# Patient Record
Sex: FEMALE | Race: OTHER | NOT HISPANIC OR LATINO
[De-identification: names, ages, dates, MRNs, and addresses within clinical notes are randomized per-mention and may not be internally consistent; named-entity substitution may affect disease eponyms.]

---

## 2020-07-07 PROBLEM — Z00.00 ENCOUNTER FOR PREVENTIVE HEALTH EXAMINATION: Status: ACTIVE | Noted: 2020-07-07

## 2020-07-08 ENCOUNTER — TRANSCRIPTION ENCOUNTER (OUTPATIENT)
Age: 80
End: 2020-07-08

## 2020-07-09 ENCOUNTER — RESULT REVIEW (OUTPATIENT)
Age: 80
End: 2020-07-09

## 2020-07-09 ENCOUNTER — APPOINTMENT (OUTPATIENT)
Dept: HEMATOLOGY ONCOLOGY | Facility: CLINIC | Age: 80
End: 2020-07-09
Payer: MEDICARE

## 2020-07-09 ENCOUNTER — OUTPATIENT (OUTPATIENT)
Dept: OUTPATIENT SERVICES | Facility: HOSPITAL | Age: 80
LOS: 1 days | End: 2020-07-09
Payer: MEDICARE

## 2020-07-09 ENCOUNTER — OUTPATIENT (OUTPATIENT)
Dept: OUTPATIENT SERVICES | Facility: HOSPITAL | Age: 80
LOS: 1 days | Discharge: HOME | End: 2020-07-09

## 2020-07-09 VITALS
WEIGHT: 139.99 LBS | HEIGHT: 60.63 IN | RESPIRATION RATE: 19 BRPM | HEART RATE: 124 BPM | TEMPERATURE: 97.7 F | DIASTOLIC BLOOD PRESSURE: 87 MMHG | SYSTOLIC BLOOD PRESSURE: 142 MMHG | BODY MASS INDEX: 26.78 KG/M2 | OXYGEN SATURATION: 91 %

## 2020-07-09 DIAGNOSIS — Z82.49 FAMILY HISTORY OF ISCHEMIC HEART DISEASE AND OTHER DISEASES OF THE CIRCULATORY SYSTEM: ICD-10-CM

## 2020-07-09 DIAGNOSIS — D72.89 OTHER SPECIFIED DISORDERS OF WHITE BLOOD CELLS: ICD-10-CM

## 2020-07-09 DIAGNOSIS — D47.3 ESSENTIAL (HEMORRHAGIC) THROMBOCYTHEMIA: ICD-10-CM

## 2020-07-09 LAB
ANISOCYTOSIS BLD QL: SLIGHT — SIGNIFICANT CHANGE UP
BASOPHILS # BLD AUTO: 0.26 K/UL — HIGH (ref 0–0.2)
BASOPHILS NFR BLD AUTO: 2 % — SIGNIFICANT CHANGE UP (ref 0–2)
BURR CELLS BLD QL SMEAR: PRESENT — SIGNIFICANT CHANGE UP
ELLIPTOCYTES BLD QL SMEAR: SLIGHT — SIGNIFICANT CHANGE UP
EOSINOPHIL # BLD AUTO: 1.17 K/UL — HIGH (ref 0–0.5)
EOSINOPHIL NFR BLD AUTO: 9 % — HIGH (ref 0–6)
ERYTHROCYTE [SEDIMENTATION RATE] IN BLOOD: 10 MM/HR — SIGNIFICANT CHANGE UP (ref 0–20)
GIANT PLATELETS BLD QL SMEAR: PRESENT — SIGNIFICANT CHANGE UP
HCT VFR BLD CALC: 40.8 % — SIGNIFICANT CHANGE UP (ref 34.5–45)
HGB BLD-MCNC: 12.1 G/DL — SIGNIFICANT CHANGE UP (ref 11.5–15.5)
LG PLATELETS BLD QL AUTO: SIGNIFICANT CHANGE UP
LYMPHOCYTES # BLD AUTO: 17 % — SIGNIFICANT CHANGE UP (ref 13–44)
LYMPHOCYTES # BLD AUTO: 2.2 K/UL — SIGNIFICANT CHANGE UP (ref 1–3.3)
MCHC RBC-ENTMCNC: 22.9 PG — LOW (ref 27–34)
MCHC RBC-ENTMCNC: 29.7 GM/DL — LOW (ref 32–36)
MCV RBC AUTO: 77.1 FL — LOW (ref 80–100)
MONOCYTES # BLD AUTO: 0.39 K/UL — SIGNIFICANT CHANGE UP (ref 0–0.9)
MONOCYTES NFR BLD AUTO: 3 % — SIGNIFICANT CHANGE UP (ref 2–14)
NEUTROPHILS # BLD AUTO: 8.94 K/UL — HIGH (ref 1.8–7.4)
NEUTROPHILS NFR BLD AUTO: 69 % — SIGNIFICANT CHANGE UP (ref 43–77)
NRBC # BLD: 0 /100 — SIGNIFICANT CHANGE UP (ref 0–0)
NRBC # BLD: SIGNIFICANT CHANGE UP /100 WBCS (ref 0–0)
PLAT MORPH BLD: ABNORMAL
PLATELET # BLD AUTO: 1143 K/UL — CRITICAL HIGH (ref 150–400)
POIKILOCYTOSIS BLD QL AUTO: SLIGHT — SIGNIFICANT CHANGE UP
POLYCHROMASIA BLD QL SMEAR: SLIGHT — SIGNIFICANT CHANGE UP
RBC # BLD: 5.29 M/UL — HIGH (ref 3.8–5.2)
RBC # FLD: 18.2 % — HIGH (ref 10.3–14.5)
RBC BLD AUTO: ABNORMAL
WBC # BLD: 12.96 K/UL — HIGH (ref 3.8–10.5)
WBC # FLD AUTO: 12.96 K/UL — HIGH (ref 3.8–10.5)

## 2020-07-09 PROCEDURE — 88189 FLOWCYTOMETRY/READ 16 & >: CPT

## 2020-07-09 PROCEDURE — 88305 TISSUE EXAM BY PATHOLOGIST: CPT

## 2020-07-09 PROCEDURE — 88237 TISSUE CULTURE BONE MARROW: CPT

## 2020-07-09 PROCEDURE — 81219 CALR GENE COM VARIANTS: CPT

## 2020-07-09 PROCEDURE — 81270 JAK2 GENE: CPT

## 2020-07-09 PROCEDURE — 87205 SMEAR GRAM STAIN: CPT

## 2020-07-09 PROCEDURE — 81207 BCR/ABL1 GENE MINOR BP: CPT

## 2020-07-09 PROCEDURE — 88275 CYTOGENETICS 100-300: CPT

## 2020-07-09 PROCEDURE — 88280 CHROMOSOME KARYOTYPE STUDY: CPT

## 2020-07-09 PROCEDURE — 88271 CYTOGENETICS DNA PROBE: CPT

## 2020-07-09 PROCEDURE — 85097 BONE MARROW INTERPRETATION: CPT

## 2020-07-09 PROCEDURE — 88291 CYTO/MOLECULAR REPORT: CPT | Mod: 59

## 2020-07-09 PROCEDURE — 88313 SPECIAL STAINS GROUP 2: CPT

## 2020-07-09 PROCEDURE — 81206 BCR/ABL1 GENE MAJOR BP: CPT

## 2020-07-09 PROCEDURE — 88108 CYTOPATH CONCENTRATE TECH: CPT | Mod: 26,59

## 2020-07-09 PROCEDURE — 88313 SPECIAL STAINS GROUP 2: CPT | Mod: 26

## 2020-07-09 PROCEDURE — 88264 CHROMOSOME ANALYSIS 20-25: CPT

## 2020-07-09 PROCEDURE — 99204 OFFICE O/P NEW MOD 45 MIN: CPT | Mod: 25

## 2020-07-09 PROCEDURE — 88184 FLOWCYTOMETRY/ TC 1 MARKER: CPT

## 2020-07-09 PROCEDURE — 81338 MPL GENE COMMON VARIANTS: CPT

## 2020-07-09 PROCEDURE — G0452: CPT | Mod: 26

## 2020-07-09 PROCEDURE — 88185 FLOWCYTOMETRY/TC ADD-ON: CPT

## 2020-07-09 PROCEDURE — 88305 TISSUE EXAM BY PATHOLOGIST: CPT | Mod: 26

## 2020-07-09 PROCEDURE — 88189 FLOWCYTOMETRY/READ 16 & >: CPT | Mod: 59

## 2020-07-09 PROCEDURE — 38222 DX BONE MARROW BX & ASPIR: CPT | Mod: RT

## 2020-07-09 NOTE — REVIEW OF SYSTEMS
[Negative] : Allergic/Immunologic [Fatigue] : fatigue [Recent Change In Weight] : ~T recent weight change [Joint Pain] : joint pain [FreeTextEntry2] : Lost 10 pounds in 6 months.  [FreeTextEntry9] : right knee pain for several years.

## 2020-07-09 NOTE — ASSESSMENT
[FreeTextEntry1] : 79 yo woman originally from [facility] with PMhx of  HTN, HLD, referred for thrombocytosis.\par \par Patient has been feeling tired, denies fevers, night sweats, positive for  weight loss 10 pounds /6 months. Denies bleeding or thrombosis. \par \par Patient had a recent adverse reaction to atorvastatin, generalized muscle pain, she discontinued the medication on 7/7/2020. \par \par Patient lives with her daughter. \par \par 7/1/2020 WBC 15.8, Hb 11.8 g/dl, Hct 39.1%, MCV 78.2, , neutrophils 8753, myelocytes 474, basophils 932.\par \par I had a detailed discussion today with the patient regarding the natural history, epidemiology, diagnosis and treatment of a myeloproliferative disorder. I reviewed her laboratory  studies in detail today and was concerned for the presence of basophilia. I then discussed the need to do a bone marrow biopsy to r/o MPD versus MDS. I reviewed with patient the benefits versus risks of procedure. I answered all her questions to satisfaction.\par \par Bone marrow was done under sterile technique in the right superior iliac bone, no complications. BCR/ABL, ANA CRISTINA 2 v617, MPL and CALR mutations were requested. \par \par Reviewed of peripheral smear c/w normochromic normocytic anemia, no evidence of dysplasia, hemolysis  or polychromasia. Positive for increased PLts approx 1 million. No blats appreciated, few myelocytes. \par \par Plan to start hydroxyurea 500 mg q 12 hrs. \par \par RTC 2 weeks to review results.  \par \par

## 2020-07-09 NOTE — HISTORY OF PRESENT ILLNESS
[0 - No Distress] : Distress Level: 0 [de-identified] : 81 yo woman originally from [facility] with PMhx of  HTN, HLD, referred for thrombocytosis.\par \par Patient has been feeling tired, denies fevers, night sweats, positive for  weight loss 10 pounds /6 months. Denies bleeding or thrombosis. \par \par Patient had a recent adverse reaction to atorvastatin, generalized muscle pain, she discontinued the medication on 7/7/2020. \par \par Patient lives with her daughter. \par \par 7/1/2020 WBC 15.8, Hb 11.8 g/dl, Hct 39.1%, MCV 78.2, , neutrophils 8753, myelocytes 474, basophils 932.

## 2020-07-09 NOTE — PHYSICAL EXAM
[Normal] : affect appropriate [Restricted in physically strenuous activity but ambulatory and able to carry out work of a light or sedentary nature] : Status 1- Restricted in physically strenuous activity but ambulatory and able to carry out work of a light or sedentary nature, e.g., light house work, office work [de-identified] : No hepatosplenomegaly [de-identified] : positive for 2 cm lymph node in the right axilla.

## 2020-07-10 LAB
ALBUMIN SERPL ELPH-MCNC: 4 G/DL
ALP BLD-CCNC: 238 U/L
ALT SERPL-CCNC: 24 U/L
ANION GAP SERPL CALC-SCNC: 14 MMOL/L
AST SERPL-CCNC: 39 U/L
BILIRUB SERPL-MCNC: 0.5 MG/DL
BUN SERPL-MCNC: 10 MG/DL
CALCIUM SERPL-MCNC: 9.5 MG/DL
CALRETICULIN INTERPRETATION: SIGNIFICANT CHANGE UP
CHLORIDE SERPL-SCNC: 98 MMOL/L
CO2 SERPL-SCNC: 26 MMOL/L
CREAT SERPL-MCNC: 1.05 MG/DL
CRP SERPL-MCNC: 0.32 MG/DL
DEPRECATED KAPPA LC FREE/LAMBDA SER: 1.83 RATIO
FERRITIN SERPL-MCNC: 64 NG/ML
FOLATE SERPL-MCNC: 14.9 NG/ML
GLUCOSE SERPL-MCNC: 107 MG/DL
HAV IGM SER QL: NONREACTIVE
HAV IGM SER QL: NONREACTIVE
HBV CORE IGG+IGM SER QL: REACTIVE
HBV CORE IGM SER QL: NONREACTIVE
HBV CORE IGM SER QL: NONREACTIVE
HBV SURFACE AB SER QL: REACTIVE
HBV SURFACE AG SER QL: NONREACTIVE
HBV SURFACE AG SER QL: NONREACTIVE
HCV AB SER QL: NONREACTIVE
HCV S/CO RATIO: 0.2 S/CO
IRON SATN MFR SERPL: 8 %
IRON SERPL-MCNC: 28 UG/DL
KAPPA LC CSF-MCNC: 1.39 MG/DL
KAPPA LC SERPL-MCNC: 2.54 MG/DL
LDH SERPL-CCNC: 326 U/L
POTASSIUM SERPL-SCNC: 4.7 MMOL/L
PROT SERPL-MCNC: 6.7 G/DL
SODIUM SERPL-SCNC: 137 MMOL/L
TIBC SERPL-MCNC: 362 UG/DL
TM INTERPRETATION: SIGNIFICANT CHANGE UP
UIBC SERPL-MCNC: 335 UG/DL
VIT B12 SERPL-MCNC: 875 PG/ML

## 2020-07-13 ENCOUNTER — APPOINTMENT (OUTPATIENT)
Dept: INFUSION THERAPY | Facility: HOSPITAL | Age: 80
End: 2020-07-13

## 2020-07-13 LAB
CHROM ANALY INTERPHASE BLD FISH-IMP: SIGNIFICANT CHANGE UP
TM INTERPRETATION: SIGNIFICANT CHANGE UP

## 2020-07-14 ENCOUNTER — APPOINTMENT (OUTPATIENT)
Dept: INFUSION THERAPY | Facility: HOSPITAL | Age: 80
End: 2020-07-14

## 2020-07-14 ENCOUNTER — RESULT REVIEW (OUTPATIENT)
Age: 80
End: 2020-07-14

## 2020-07-14 LAB
AMINO ACID: SIGNIFICANT CHANGE UP
ANISOCYTOSIS BLD QL: SLIGHT — SIGNIFICANT CHANGE UP
ASSAY DETAILS: SIGNIFICANT CHANGE UP
BASOPHILS # BLD AUTO: 0.6 K/UL — HIGH (ref 0–0.2)
BASOPHILS NFR BLD AUTO: 4 % — HIGH (ref 0–2)
BCR/ABL BY RT - PCR QUANTITATIVE: SIGNIFICANT CHANGE UP
BLOCK/SPECIMEN ID: SIGNIFICANT CHANGE UP
BURR CELLS BLD QL SMEAR: PRESENT — SIGNIFICANT CHANGE UP
CLINICAL INFO: SIGNIFICANT CHANGE UP
DACRYOCYTES BLD QL SMEAR: SLIGHT — SIGNIFICANT CHANGE UP
ELLIPTOCYTES BLD QL SMEAR: SLIGHT — SIGNIFICANT CHANGE UP
EOSINOPHIL # BLD AUTO: 1.06 K/UL — HIGH (ref 0–0.5)
EOSINOPHIL NFR BLD AUTO: 7 % — HIGH (ref 0–6)
EXON: SIGNIFICANT CHANGE UP
GENE: SIGNIFICANT CHANGE UP
HCT VFR BLD CALC: 41.5 % — SIGNIFICANT CHANGE UP (ref 34.5–45)
HGB BLD-MCNC: 12.9 G/DL — SIGNIFICANT CHANGE UP (ref 11.5–15.5)
HYPOCHROMIA BLD QL: SLIGHT — SIGNIFICANT CHANGE UP
INTERPRETATION CALR: SIGNIFICANT CHANGE UP
LYMPHOCYTES # BLD AUTO: 21 % — SIGNIFICANT CHANGE UP (ref 13–44)
LYMPHOCYTES # BLD AUTO: 3.17 K/UL — SIGNIFICANT CHANGE UP (ref 1–3.3)
MACROCYTES BLD QL: SLIGHT — SIGNIFICANT CHANGE UP
MCHC RBC-ENTMCNC: 23.3 PG — LOW (ref 27–34)
MCHC RBC-ENTMCNC: 31.1 GM/DL — LOW (ref 32–36)
MCV RBC AUTO: 74.9 FL — LOW (ref 80–100)
MICROCYTES BLD QL: SLIGHT — SIGNIFICANT CHANGE UP
MONOCYTES # BLD AUTO: 0.91 K/UL — HIGH (ref 0–0.9)
MONOCYTES NFR BLD AUTO: 6 % — SIGNIFICANT CHANGE UP (ref 2–14)
MPL EXON 10 MUTATION: SIGNIFICANT CHANGE UP
MPL SPECIMEN SOURCE:: SIGNIFICANT CHANGE UP
MUTATION FREQUENCY: SIGNIFICANT CHANGE UP
MUTATION TYPE: SIGNIFICANT CHANGE UP
NEUTROPHILS # BLD AUTO: 9.36 K/UL — HIGH (ref 1.8–7.4)
NEUTROPHILS NFR BLD AUTO: 60 % — SIGNIFICANT CHANGE UP (ref 43–77)
NEUTS BAND # BLD: 2 % — SIGNIFICANT CHANGE UP (ref 0–8)
NRBC # BLD: 0 /100 — SIGNIFICANT CHANGE UP (ref 0–0)
NRBC # BLD: SIGNIFICANT CHANGE UP /100 WBCS (ref 0–0)
NUCLEOTIDE CHANGE: SIGNIFICANT CHANGE UP
PLAT MORPH BLD: NORMAL — SIGNIFICANT CHANGE UP
PLATELET # BLD AUTO: 1010 K/UL — CRITICAL HIGH (ref 150–400)
POIKILOCYTOSIS BLD QL AUTO: SIGNIFICANT CHANGE UP
RBC # BLD: 5.54 M/UL — HIGH (ref 3.8–5.2)
RBC # FLD: 19.2 % — HIGH (ref 10.3–14.5)
RBC BLD AUTO: ABNORMAL
REFERENCE: SIGNIFICANT CHANGE UP
SARS-COV-2 N GENE NPH QL NAA+PROBE: NOT DETECTED
SCHISTOCYTES BLD QL AUTO: SLIGHT — SIGNIFICANT CHANGE UP
WBC # BLD: 15.1 K/UL — HIGH (ref 3.8–10.5)
WBC # FLD AUTO: 15.1 K/UL — HIGH (ref 3.8–10.5)

## 2020-07-15 DIAGNOSIS — R11.2 NAUSEA WITH VOMITING, UNSPECIFIED: ICD-10-CM

## 2020-07-15 DIAGNOSIS — D50.9 IRON DEFICIENCY ANEMIA, UNSPECIFIED: ICD-10-CM

## 2020-07-15 DIAGNOSIS — Z51.11 ENCOUNTER FOR ANTINEOPLASTIC CHEMOTHERAPY: ICD-10-CM

## 2020-07-15 LAB
HEMATOPATHOLOGY REPORT: SIGNIFICANT CHANGE UP
JAK2 P.V617F BLD/T QL: SIGNIFICANT CHANGE UP

## 2020-07-16 ENCOUNTER — APPOINTMENT (OUTPATIENT)
Dept: HEMATOLOGY ONCOLOGY | Facility: CLINIC | Age: 80
End: 2020-07-16
Payer: MEDICARE

## 2020-07-16 LAB — M PROTEIN SPEC IFE-MCNC: NORMAL

## 2020-07-16 PROCEDURE — 99442: CPT

## 2020-07-20 LAB — CHROM ANALY OVERALL INTERP SPEC-IMP: SIGNIFICANT CHANGE UP

## 2020-07-21 ENCOUNTER — APPOINTMENT (OUTPATIENT)
Dept: HEMATOLOGY ONCOLOGY | Facility: CLINIC | Age: 80
End: 2020-07-21
Payer: MEDICARE

## 2020-07-21 ENCOUNTER — APPOINTMENT (OUTPATIENT)
Dept: INFUSION THERAPY | Facility: HOSPITAL | Age: 80
End: 2020-07-21

## 2020-07-21 ENCOUNTER — RESULT REVIEW (OUTPATIENT)
Age: 80
End: 2020-07-21

## 2020-07-21 VITALS
SYSTOLIC BLOOD PRESSURE: 146 MMHG | DIASTOLIC BLOOD PRESSURE: 77 MMHG | TEMPERATURE: 98.5 F | WEIGHT: 137.79 LBS | OXYGEN SATURATION: 98 % | RESPIRATION RATE: 17 BRPM | BODY MASS INDEX: 26.35 KG/M2 | HEART RATE: 95 BPM

## 2020-07-21 LAB
ANISOCYTOSIS BLD QL: SIGNIFICANT CHANGE UP
BASO STIPL BLD QL SMEAR: PRESENT — SIGNIFICANT CHANGE UP
BASOPHILS # BLD AUTO: 0.22 K/UL — HIGH (ref 0–0.2)
BASOPHILS NFR BLD AUTO: 2 % — SIGNIFICANT CHANGE UP (ref 0–2)
BURR CELLS BLD QL SMEAR: PRESENT — SIGNIFICANT CHANGE UP
DACRYOCYTES BLD QL SMEAR: SLIGHT — SIGNIFICANT CHANGE UP
ELLIPTOCYTES BLD QL SMEAR: SIGNIFICANT CHANGE UP
EOSINOPHIL # BLD AUTO: 0.67 K/UL — HIGH (ref 0–0.5)
EOSINOPHIL NFR BLD AUTO: 6 % — SIGNIFICANT CHANGE UP (ref 0–6)
GIANT PLATELETS BLD QL SMEAR: PRESENT — SIGNIFICANT CHANGE UP
HCT VFR BLD CALC: 42.5 % — SIGNIFICANT CHANGE UP (ref 34.5–45)
HGB BLD-MCNC: 12.9 G/DL — SIGNIFICANT CHANGE UP (ref 11.5–15.5)
LG PLATELETS BLD QL AUTO: SLIGHT — SIGNIFICANT CHANGE UP
LYMPHOCYTES # BLD AUTO: 2.7 K/UL — SIGNIFICANT CHANGE UP (ref 1–3.3)
LYMPHOCYTES # BLD AUTO: 24 % — SIGNIFICANT CHANGE UP (ref 13–44)
MACROCYTES BLD QL: SLIGHT — SIGNIFICANT CHANGE UP
MCHC RBC-ENTMCNC: 24 PG — LOW (ref 27–34)
MCHC RBC-ENTMCNC: 30.4 GM/DL — LOW (ref 32–36)
MCV RBC AUTO: 79 FL — LOW (ref 80–100)
MICROCYTES BLD QL: SLIGHT — SIGNIFICANT CHANGE UP
MONOCYTES # BLD AUTO: 0.22 K/UL — SIGNIFICANT CHANGE UP (ref 0–0.9)
MONOCYTES NFR BLD AUTO: 2 % — SIGNIFICANT CHANGE UP (ref 2–14)
NEUTROPHILS # BLD AUTO: 7.41 K/UL — HIGH (ref 1.8–7.4)
NEUTROPHILS NFR BLD AUTO: 66 % — SIGNIFICANT CHANGE UP (ref 43–77)
NRBC # BLD: 0 /100 — SIGNIFICANT CHANGE UP (ref 0–0)
NRBC # BLD: SIGNIFICANT CHANGE UP /100 WBCS (ref 0–0)
PLAT MORPH BLD: ABNORMAL
PLATELET # BLD AUTO: 688 K/UL — HIGH (ref 150–400)
POIKILOCYTOSIS BLD QL AUTO: SIGNIFICANT CHANGE UP
RBC # BLD: 5.38 M/UL — HIGH (ref 3.8–5.2)
RBC # FLD: 21.1 % — HIGH (ref 10.3–14.5)
RBC BLD AUTO: ABNORMAL
SCHISTOCYTES BLD QL AUTO: SLIGHT — SIGNIFICANT CHANGE UP
WBC # BLD: 11.23 K/UL — HIGH (ref 3.8–10.5)
WBC # FLD AUTO: 11.23 K/UL — HIGH (ref 3.8–10.5)

## 2020-07-21 PROCEDURE — 99214 OFFICE O/P EST MOD 30 MIN: CPT

## 2020-07-22 LAB
ALBUMIN SERPL ELPH-MCNC: 4.3 G/DL
ALP BLD-CCNC: 156 U/L
ALT SERPL-CCNC: 19 U/L
ANION GAP SERPL CALC-SCNC: 11 MMOL/L
AST SERPL-CCNC: 27 U/L
BILIRUB SERPL-MCNC: 0.6 MG/DL
BUN SERPL-MCNC: 14 MG/DL
CALCIUM SERPL-MCNC: 9.6 MG/DL
CHLORIDE SERPL-SCNC: 101 MMOL/L
CO2 SERPL-SCNC: 28 MMOL/L
CREAT SERPL-MCNC: 1.22 MG/DL
GLUCOSE SERPL-MCNC: 113 MG/DL
LDH SERPL-CCNC: 284 U/L
POTASSIUM SERPL-SCNC: 4.4 MMOL/L
PROT SERPL-MCNC: 7.1 G/DL
SODIUM SERPL-SCNC: 140 MMOL/L

## 2020-08-04 ENCOUNTER — OUTPATIENT (OUTPATIENT)
Dept: OUTPATIENT SERVICES | Facility: HOSPITAL | Age: 80
LOS: 1 days | Discharge: HOME | End: 2020-08-04

## 2020-08-04 DIAGNOSIS — D72.89 OTHER SPECIFIED DISORDERS OF WHITE BLOOD CELLS: ICD-10-CM

## 2020-08-04 PROBLEM — E78.5 HYPERLIPIDEMIA, UNSPECIFIED: Chronic | Status: ACTIVE | Noted: 2020-07-10

## 2020-08-04 PROBLEM — D47.3 ESSENTIAL (HEMORRHAGIC) THROMBOCYTHEMIA: Chronic | Status: ACTIVE | Noted: 2020-07-10

## 2020-08-04 PROBLEM — I10 ESSENTIAL (PRIMARY) HYPERTENSION: Chronic | Status: ACTIVE | Noted: 2020-07-10

## 2020-08-11 ENCOUNTER — RESULT REVIEW (OUTPATIENT)
Age: 80
End: 2020-08-11

## 2020-08-11 ENCOUNTER — APPOINTMENT (OUTPATIENT)
Dept: HEMATOLOGY ONCOLOGY | Facility: CLINIC | Age: 80
End: 2020-08-11
Payer: MEDICARE

## 2020-08-11 VITALS
BODY MASS INDEX: 25.93 KG/M2 | DIASTOLIC BLOOD PRESSURE: 87 MMHG | SYSTOLIC BLOOD PRESSURE: 132 MMHG | OXYGEN SATURATION: 100 % | TEMPERATURE: 97.7 F | RESPIRATION RATE: 16 BRPM | WEIGHT: 135.58 LBS | HEART RATE: 89 BPM

## 2020-08-11 LAB
ANISOCYTOSIS BLD QL: SLIGHT — SIGNIFICANT CHANGE UP
BASOPHILS # BLD AUTO: 0 K/UL — SIGNIFICANT CHANGE UP (ref 0–0.2)
BASOPHILS NFR BLD AUTO: 0 % — SIGNIFICANT CHANGE UP (ref 0–2)
ELLIPTOCYTES BLD QL SMEAR: SLIGHT — SIGNIFICANT CHANGE UP
EOSINOPHIL # BLD AUTO: 0.29 K/UL — SIGNIFICANT CHANGE UP (ref 0–0.5)
EOSINOPHIL NFR BLD AUTO: 4 % — SIGNIFICANT CHANGE UP (ref 0–6)
HCT VFR BLD CALC: 41.7 % — SIGNIFICANT CHANGE UP (ref 34.5–45)
HGB BLD-MCNC: 13.7 G/DL — SIGNIFICANT CHANGE UP (ref 11.5–15.5)
LYMPHOCYTES # BLD AUTO: 2.2 K/UL — SIGNIFICANT CHANGE UP (ref 1–3.3)
LYMPHOCYTES # BLD AUTO: 30 % — SIGNIFICANT CHANGE UP (ref 13–44)
MACROCYTES BLD QL: SLIGHT — SIGNIFICANT CHANGE UP
MCHC RBC-ENTMCNC: 26.1 PG — LOW (ref 27–34)
MCHC RBC-ENTMCNC: 32.9 GM/DL — SIGNIFICANT CHANGE UP (ref 32–36)
MCV RBC AUTO: 79.4 FL — LOW (ref 80–100)
MICROCYTES BLD QL: SLIGHT — SIGNIFICANT CHANGE UP
MONOCYTES # BLD AUTO: 0.44 K/UL — SIGNIFICANT CHANGE UP (ref 0–0.9)
MONOCYTES NFR BLD AUTO: 6 % — SIGNIFICANT CHANGE UP (ref 2–14)
NEUTROPHILS # BLD AUTO: 4.4 K/UL — SIGNIFICANT CHANGE UP (ref 1.8–7.4)
NEUTROPHILS NFR BLD AUTO: 60 % — SIGNIFICANT CHANGE UP (ref 43–77)
NRBC # BLD: 0 /100 — SIGNIFICANT CHANGE UP (ref 0–0)
NRBC # BLD: SIGNIFICANT CHANGE UP /100 WBCS (ref 0–0)
PLAT MORPH BLD: NORMAL — SIGNIFICANT CHANGE UP
PLATELET # BLD AUTO: 282 K/UL — SIGNIFICANT CHANGE UP (ref 150–400)
POIKILOCYTOSIS BLD QL AUTO: SLIGHT — SIGNIFICANT CHANGE UP
RBC # BLD: 5.25 M/UL — HIGH (ref 3.8–5.2)
RBC # FLD: 28 % — HIGH (ref 10.3–14.5)
RBC BLD AUTO: ABNORMAL
SCHISTOCYTES BLD QL AUTO: SLIGHT — SIGNIFICANT CHANGE UP
WBC # BLD: 7.34 K/UL — SIGNIFICANT CHANGE UP (ref 3.8–10.5)
WBC # FLD AUTO: 7.34 K/UL — SIGNIFICANT CHANGE UP (ref 3.8–10.5)

## 2020-08-11 PROCEDURE — 99213 OFFICE O/P EST LOW 20 MIN: CPT

## 2020-08-11 NOTE — REVIEW OF SYSTEMS
[Fatigue] : fatigue [Joint Pain] : joint pain [Negative] : Allergic/Immunologic [Recent Change In Weight] : ~T no recent weight change [FreeTextEntry9] : right knee pain for several years.

## 2020-08-11 NOTE — PHYSICAL EXAM
[Restricted in physically strenuous activity but ambulatory and able to carry out work of a light or sedentary nature] : Status 1- Restricted in physically strenuous activity but ambulatory and able to carry out work of a light or sedentary nature, e.g., light house work, office work [Normal] : affect appropriate [de-identified] : No hepatosplenomegaly [de-identified] : positive for 2 cm lymph node in the right axilla.

## 2020-08-11 NOTE — ASSESSMENT
[FreeTextEntry1] : 81 yo woman originally from [facility] with PMhx of  HTN, HLD, with essential thrombocytosis. \par \par Patient has been feeling tired, denies fevers, night sweats, positive for  weight loss 10 pounds /6 months. Denies bleeding or thrombosis. \par \par 7/1/2020 WBC 15.8, Hb 11.8 g/dl, Hct 39.1%, MCV 78.2, , neutrophils 8753, myelocytes 474, basophils 932.\par \par Bone marrow biopsy 7/6/2020:\par hypercellular bone marrow with trilineage hematopoiesis and increase proportion of hyperlobated megakaryocytes, favor essential thrombocytosis. \par Normal FISH for PDGFRA\par Normal female karyotype. 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 \par \par Continue  hydroxyurea 1000 mg daily PLT count 282K 8/11/2020. \par \par Iron deficiency anemia: completed  Feraheme 510 mg weekly x 2 doses. Will recheck iron levels next visit.\par \par RTC  2  months.\par

## 2020-08-11 NOTE — HISTORY OF PRESENT ILLNESS
[0 - No Distress] : Distress Level: 0 [de-identified] : 79 yo woman originally from [facility] with PMhx of  HTN, HLD, referred for thrombocytosis.\par \par Patient has been feeling tired, denies fevers, night sweats, positive for  weight loss 10 pounds /6 months. Denies bleeding or thrombosis. \par \par Patient had a recent adverse reaction to atorvastatin, generalized muscle pain, she discontinued the medication on 7/7/2020. \par \par Patient lives with her daughter. \par \par 7/1/2020 WBC 15.8, Hb 11.8 g/dl, Hct 39.1%, MCV 78.2, , neutrophils 8753, myelocytes 474, basophils 932. [de-identified] : Bone marrow biopsy 7/6/2020:\par hypercellular bone marrow with trilineage hematopoiesis and increase proportion of hyperlobated megakaryocytes, favor essential thrombocytosis. \par Normal FISH for PDGFRA\par Normal female karyotype. 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 \par \par Patient is feeling well, denies fevers, night sweats or weight loss. \par \par Patient is on hydroxyurea 1000 mg daily, tolerate well. PLT 282K. \par \par No other changes in medical, surgical or social history since 7/21/2020. \par

## 2020-08-11 NOTE — PROCEDURE
[Bone Marrow Biopsy] : bone marrow biopsy [Bone Marrow Aspiration] : bone marrow aspiration  [Patient] : the patient [Verbal Consent Obtained] : verbal consent was obtained prior to the procedure [Patient identification verified] : patient identification verified [Procedure verified and consent obtained] : procedure verified and consent obtained [Laterality verified and correct site marked] : laterality verified and correct site marked [Right] : site: right [Correct positioning] : correct positioning [Prone] : prone [Superior iliac spine was identified] : the superior iliac spine was identified. [The right posterior iliac crest was prepped with betadine and draped, using sterile technique.] : The right posterior iliac crest was prepped with betadine and draped, using sterile technique. [Lidocaine was injected and into the periosteum overlying the site.] : Lidocaine was injected and into the periosteum overlying the site. [Aspirate] : aspirate [Cytogenetics] : cytogenetics [FISH] : FISH [Biopsy] : biopsy [Flow Cytometry] : flow cytometry [] : The patient was instructed to remove the bandage the following AM. The patient may bathe. Acetaminophen may be taken for discomfort, as per package directions.If there are any other problems, the patient was instructed to call the office. The patient verbalized understanding, and is aware of the office contact numbers. [FreeTextEntry1] : R/O MDS versus MPD

## 2020-08-24 ENCOUNTER — TRANSCRIPTION ENCOUNTER (OUTPATIENT)
Age: 80
End: 2020-08-24

## 2020-10-08 ENCOUNTER — OUTPATIENT (OUTPATIENT)
Dept: OUTPATIENT SERVICES | Facility: HOSPITAL | Age: 80
LOS: 1 days | Discharge: HOME | End: 2020-10-08

## 2020-10-08 DIAGNOSIS — D72.89 OTHER SPECIFIED DISORDERS OF WHITE BLOOD CELLS: ICD-10-CM

## 2020-10-12 ENCOUNTER — APPOINTMENT (OUTPATIENT)
Dept: HEMATOLOGY ONCOLOGY | Facility: CLINIC | Age: 80
End: 2020-10-12

## 2020-10-12 ENCOUNTER — RESULT REVIEW (OUTPATIENT)
Age: 80
End: 2020-10-12

## 2020-10-12 ENCOUNTER — APPOINTMENT (OUTPATIENT)
Dept: HEMATOLOGY ONCOLOGY | Facility: CLINIC | Age: 80
End: 2020-10-12
Payer: MEDICARE

## 2020-10-12 LAB
BASOPHILS # BLD AUTO: 0.06 K/UL — SIGNIFICANT CHANGE UP (ref 0–0.2)
BASOPHILS NFR BLD AUTO: 1.1 % — SIGNIFICANT CHANGE UP (ref 0–2)
EOSINOPHIL # BLD AUTO: 0.18 K/UL — SIGNIFICANT CHANGE UP (ref 0–0.5)
EOSINOPHIL NFR BLD AUTO: 3.2 % — SIGNIFICANT CHANGE UP (ref 0–6)
HCT VFR BLD CALC: 36.6 % — SIGNIFICANT CHANGE UP (ref 34.5–45)
HGB BLD-MCNC: 12.1 G/DL — SIGNIFICANT CHANGE UP (ref 11.5–15.5)
IMM GRANULOCYTES NFR BLD AUTO: 0.4 % — SIGNIFICANT CHANGE UP (ref 0–1.5)
LYMPHOCYTES # BLD AUTO: 2.03 K/UL — SIGNIFICANT CHANGE UP (ref 1–3.3)
LYMPHOCYTES # BLD AUTO: 36.5 % — SIGNIFICANT CHANGE UP (ref 13–44)
MCHC RBC-ENTMCNC: 32.7 PG — SIGNIFICANT CHANGE UP (ref 27–34)
MCHC RBC-ENTMCNC: 33.1 G/DL — SIGNIFICANT CHANGE UP (ref 32–36)
MCV RBC AUTO: 98.9 FL — SIGNIFICANT CHANGE UP (ref 80–100)
MONOCYTES # BLD AUTO: 0.52 K/UL — SIGNIFICANT CHANGE UP (ref 0–0.9)
MONOCYTES NFR BLD AUTO: 9.4 % — SIGNIFICANT CHANGE UP (ref 2–14)
NEUTROPHILS # BLD AUTO: 2.75 K/UL — SIGNIFICANT CHANGE UP (ref 1.8–7.4)
NEUTROPHILS NFR BLD AUTO: 49.4 % — SIGNIFICANT CHANGE UP (ref 43–77)
NRBC # BLD: 0 /100 WBCS — SIGNIFICANT CHANGE UP (ref 0–0)
PLATELET # BLD AUTO: 160 K/UL — SIGNIFICANT CHANGE UP (ref 150–400)
RBC # BLD: 3.7 M/UL — LOW (ref 3.8–5.2)
RBC # FLD: SIGNIFICANT CHANGE UP (ref 10.3–14.5)
WBC # BLD: 5.56 K/UL — SIGNIFICANT CHANGE UP (ref 3.8–10.5)
WBC # FLD AUTO: 5.56 K/UL — SIGNIFICANT CHANGE UP (ref 3.8–10.5)

## 2020-10-12 PROCEDURE — 99442: CPT

## 2020-10-12 NOTE — HISTORY OF PRESENT ILLNESS
[0 - No Distress] : Distress Level: 0 [Home] : at home, [unfilled] , at the time of the visit. [Medical Office: ([facility])___] : at the medical office located in  [Other:____] : [unfilled] [Verbal consent obtained from patient] : the patient, [unfilled] [de-identified] : 79 yo woman originally from [facility] with PMhx of  HTN, HLD, referred for thrombocytosis.\par \par Patient has been feeling tired, denies fevers, night sweats, positive for  weight loss 10 pounds /6 months. Denies bleeding or thrombosis. \par \par Patient had a recent adverse reaction to atorvastatin, generalized muscle pain, she discontinued the medication on 7/7/2020. \par \par Patient lives with her daughter. \par \par 7/1/2020 WBC 15.8, Hb 11.8 g/dl, Hct 39.1%, MCV 78.2, , neutrophils 8753, myelocytes 474, basophils 932.\par \par Bone marrow biopsy 7/6/2020:\par hypercellular bone marrow with trilineage hematopoiesis and increase proportion of hyperlobated megakaryocytes, favor essential thrombocytosis. \par Normal FISH for PDGFRA\par Normal female karyotype. 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 \par  [de-identified] : Bone marrow biopsy 7/6/2020:\par hypercellular bone marrow with trilineage hematopoiesis and increase proportion of hyperlobated megakaryocytes, favor essential thrombocytosis. \par Normal FISH for PDGFRA\par Normal female karyotype. 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 \par \par Patient is feeling well, denies fevers, night sweats or weight loss. \par \par Patient is on hydroxyurea 1000 mg daily, tolerate well. PLT 282K. \par \par No other changes in medical, surgical or social history since 8/11/2020. \par

## 2020-10-12 NOTE — ASSESSMENT
[FreeTextEntry1] : 81 yo woman originally from [facility] with PMhx of  HTN, HLD, with essential thrombocytosis. \par \par Patient has been feeling tired, denies fevers, night sweats, positive for  weight loss 10 pounds /6 months. Denies bleeding or thrombosis. \par \par 7/1/2020 WBC 15.8, Hb 11.8 g/dl, Hct 39.1%, MCV 78.2, , neutrophils 8753, myelocytes 474, basophils 932.\par \par Bone marrow biopsy 7/6/2020:\par hypercellular bone marrow with trilineage hematopoiesis and increase proportion of hyperlobated megakaryocytes, favor essential thrombocytosis. \par Normal FISH for PDGFRA\par Normal female karyotype. 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 \par \par Continue  hydroxyurea 1000 mg daily PLT count 282K 8/11/2020. \par \par Iron deficiency anemia: completed  Feraheme 510 mg weekly x 2 doses. Will recheck iron levels next visit.\par \par This service was provided by using telehealth. The patient [place] home and [place] [facility] requested and participated in this encounter. The encounter  last  30  minutes coordinating his/her care and counseling.\par \par \par RTC  1  month. \par

## 2020-10-12 NOTE — PHYSICAL EXAM
[Restricted in physically strenuous activity but ambulatory and able to carry out work of a light or sedentary nature] : Status 1- Restricted in physically strenuous activity but ambulatory and able to carry out work of a light or sedentary nature, e.g., light house work, office work [Normal] : full range of motion and no deformities appreciated [de-identified] : No hepatosplenomegaly [de-identified] : positive for 2 cm lymph node in the right axilla.

## 2020-10-13 LAB
ALBUMIN SERPL ELPH-MCNC: 4.2 G/DL
ALP BLD-CCNC: 156 U/L
ALT SERPL-CCNC: 20 U/L
ANION GAP SERPL CALC-SCNC: 11 MMOL/L
AST SERPL-CCNC: 31 U/L
BILIRUB SERPL-MCNC: 0.3 MG/DL
BUN SERPL-MCNC: 18 MG/DL
CALCIUM SERPL-MCNC: 9.4 MG/DL
CHLORIDE SERPL-SCNC: 101 MMOL/L
CO2 SERPL-SCNC: 28 MMOL/L
CREAT SERPL-MCNC: 1.15 MG/DL
GLUCOSE SERPL-MCNC: 105 MG/DL
LDH SERPL-CCNC: 212 U/L
POTASSIUM SERPL-SCNC: 4.1 MMOL/L
PROT SERPL-MCNC: 7.4 G/DL
SODIUM SERPL-SCNC: 140 MMOL/L

## 2020-11-16 ENCOUNTER — TRANSCRIPTION ENCOUNTER (OUTPATIENT)
Age: 80
End: 2020-11-16

## 2020-11-17 ENCOUNTER — OUTPATIENT (OUTPATIENT)
Dept: OUTPATIENT SERVICES | Facility: HOSPITAL | Age: 80
LOS: 1 days | Discharge: HOME | End: 2020-11-17

## 2020-11-17 DIAGNOSIS — D72.89 OTHER SPECIFIED DISORDERS OF WHITE BLOOD CELLS: ICD-10-CM

## 2020-11-23 ENCOUNTER — RESULT REVIEW (OUTPATIENT)
Age: 80
End: 2020-11-23

## 2020-11-23 ENCOUNTER — APPOINTMENT (OUTPATIENT)
Dept: HEMATOLOGY ONCOLOGY | Facility: CLINIC | Age: 80
End: 2020-11-23
Payer: MEDICARE

## 2020-11-23 VITALS
HEART RATE: 97 BPM | BODY MASS INDEX: 26.48 KG/M2 | SYSTOLIC BLOOD PRESSURE: 142 MMHG | TEMPERATURE: 97.6 F | HEIGHT: 60.63 IN | OXYGEN SATURATION: 100 % | DIASTOLIC BLOOD PRESSURE: 86 MMHG | WEIGHT: 138.45 LBS | RESPIRATION RATE: 15 BRPM

## 2020-11-23 LAB
BASOPHILS # BLD AUTO: 0.04 K/UL — SIGNIFICANT CHANGE UP (ref 0–0.2)
BASOPHILS NFR BLD AUTO: 0.6 % — SIGNIFICANT CHANGE UP (ref 0–2)
EOSINOPHIL # BLD AUTO: 0.23 K/UL — SIGNIFICANT CHANGE UP (ref 0–0.5)
EOSINOPHIL NFR BLD AUTO: 3.6 % — SIGNIFICANT CHANGE UP (ref 0–6)
HCT VFR BLD CALC: 34.6 % — SIGNIFICANT CHANGE UP (ref 34.5–45)
HGB BLD-MCNC: 11.6 G/DL — SIGNIFICANT CHANGE UP (ref 11.5–15.5)
IMM GRANULOCYTES NFR BLD AUTO: 0.3 % — SIGNIFICANT CHANGE UP (ref 0–1.5)
LYMPHOCYTES # BLD AUTO: 2.12 K/UL — SIGNIFICANT CHANGE UP (ref 1–3.3)
LYMPHOCYTES # BLD AUTO: 33.3 % — SIGNIFICANT CHANGE UP (ref 13–44)
MCHC RBC-ENTMCNC: 33.5 G/DL — SIGNIFICANT CHANGE UP (ref 32–36)
MCHC RBC-ENTMCNC: 37.3 PG — HIGH (ref 27–34)
MCV RBC AUTO: 111.8 FL — HIGH (ref 80–100)
MONOCYTES # BLD AUTO: 0.74 K/UL — SIGNIFICANT CHANGE UP (ref 0–0.9)
MONOCYTES NFR BLD AUTO: 11.6 % — SIGNIFICANT CHANGE UP (ref 2–14)
NEUTROPHILS # BLD AUTO: 3.21 K/UL — SIGNIFICANT CHANGE UP (ref 1.8–7.4)
NEUTROPHILS NFR BLD AUTO: 50.6 % — SIGNIFICANT CHANGE UP (ref 43–77)
NRBC # BLD: 0 /100 WBCS — SIGNIFICANT CHANGE UP (ref 0–0)
PLATELET # BLD AUTO: 236 K/UL — SIGNIFICANT CHANGE UP (ref 150–400)
RBC # BLD: 3.11 M/UL — LOW (ref 3.8–5.2)
RBC # FLD: 13.3 % — SIGNIFICANT CHANGE UP (ref 10.3–14.5)
WBC # BLD: 6.36 K/UL — SIGNIFICANT CHANGE UP (ref 3.8–10.5)
WBC # FLD AUTO: 6.36 K/UL — SIGNIFICANT CHANGE UP (ref 3.8–10.5)

## 2020-11-23 PROCEDURE — 99213 OFFICE O/P EST LOW 20 MIN: CPT

## 2020-11-23 RX ORDER — AMLODIPINE BESYLATE 2.5 MG/1
2.5 TABLET ORAL
Refills: 0 | Status: COMPLETED | COMMUNITY
End: 2020-11-23

## 2020-11-23 RX ORDER — AMLODIPINE BESYLATE 5 MG/1
5 TABLET ORAL
Qty: 90 | Refills: 0 | Status: COMPLETED | COMMUNITY
Start: 2020-09-01

## 2020-11-23 RX ORDER — HYDROCHLOROTHIAZIDE 12.5 MG/1
12.5 TABLET ORAL
Qty: 90 | Refills: 0 | Status: COMPLETED | COMMUNITY
Start: 2020-09-09

## 2020-11-23 NOTE — ASSESSMENT
[FreeTextEntry1] : 79 yo woman originally from [facility] with PMhx of  HTN, HLD, with essential thrombocytosis. \par \par Patient has been feeling tired, denies fevers, night sweats, stable weight. Denies bleeding or thrombosis. \par \par 7/1/2020 WBC 15.8, Hb 11.8 g/dl, Hct 39.1%, MCV 78.2, , neutrophils 8753, myelocytes 474, basophils 932.\par \par Bone marrow biopsy 7/6/2020:\par hypercellular bone marrow with trilineage hematopoiesis and increase proportion of hyperlobated megakaryocytes, favor essential thrombocytosis. \par Normal FISH for PDGFRA\par Normal female karyotype. 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 \par \par Continue hydroxyurea 500 mg daily PLT count 236K 11/23/2020. \par \par Iron deficiency anemia: completed  Feraheme 510 mg weekly x 2 doses. Will recheck iron levels next visit.\par \par \par RTC  2  months. \par

## 2020-11-23 NOTE — REVIEW OF SYSTEMS
[Joint Pain] : joint pain [Lower Ext Edema] : lower extremity edema [Negative] : Constitutional [Recent Change In Weight] : ~T no recent weight change [Chest Pain] : no chest pain [Palpitations] : no palpitations [Leg Claudication] : no intermittent leg claudication [FreeTextEntry5] : BLE edema [FreeTextEntry9] : right knee pain for several years.

## 2020-11-23 NOTE — HISTORY OF PRESENT ILLNESS
[0 - No Distress] : Distress Level: 0 [Other:____] : [unfilled] [de-identified] : 79 yo woman originally from [facility] with PMhx of  HTN, HLD, referred for thrombocytosis.\par \par Patient has been feeling tired, denies fevers, night sweats, positive for  weight loss 10 pounds /6 months. Denies bleeding or thrombosis. \par \par Patient had a recent adverse reaction to atorvastatin, generalized muscle pain, she discontinued the medication on 7/7/2020. \par \par Patient lives with her daughter. \par \par 7/1/2020 WBC 15.8, Hb 11.8 g/dl, Hct 39.1%, MCV 78.2, , neutrophils 8753, myelocytes 474, basophils 932.\par \par Bone marrow biopsy 7/6/2020:\par hypercellular bone marrow with trilineage hematopoiesis and increase proportion of hyperlobated megakaryocytes, favor essential thrombocytosis. \par Normal FISH for PDGFRA\par Normal female karyotype. 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 \par  [de-identified] : Bone marrow biopsy 7/6/2020:\par hypercellular bone marrow with trilineage hematopoiesis and increase proportion of hyperlobated megakaryocytes, favor essential thrombocytosis. \par Normal FISH for PDGFRA\par Normal female karyotype. 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 \par \par Patient is feeling well, denies fevers, night sweats or weight loss. Mechanical fall this past Friday, sustained minor scrapes. \par \par Patient is on hydroxyurea 500 mg daily, tolerate well. PLT 236K today. \par \par \par No other changes in medical, surgical or social history since 10/12//2020. \par

## 2020-11-23 NOTE — PHYSICAL EXAM
[Restricted in physically strenuous activity but ambulatory and able to carry out work of a light or sedentary nature] : Status 1- Restricted in physically strenuous activity but ambulatory and able to carry out work of a light or sedentary nature, e.g., light house work, office work [Normal] : normoactive bowel sounds, soft and nontender, no hepatosplenomegaly or masses appreciated [de-identified] : + 1 pitting edema to ankles b/l [de-identified] : No hepatosplenomegaly [de-identified] : positive for 2 cm lymph node in the right axilla.

## 2020-11-24 LAB
ALBUMIN SERPL ELPH-MCNC: 4.4 G/DL
ALP BLD-CCNC: 155 U/L
ALT SERPL-CCNC: 12 U/L
ANION GAP SERPL CALC-SCNC: 11 MMOL/L
AST SERPL-CCNC: 21 U/L
BILIRUB SERPL-MCNC: 0.5 MG/DL
BUN SERPL-MCNC: 16 MG/DL
CALCIUM SERPL-MCNC: 9.4 MG/DL
CHLORIDE SERPL-SCNC: 99 MMOL/L
CO2 SERPL-SCNC: 28 MMOL/L
CREAT SERPL-MCNC: 0.91 MG/DL
GLUCOSE SERPL-MCNC: 100 MG/DL
LDH SERPL-CCNC: 209 U/L
POTASSIUM SERPL-SCNC: 3.8 MMOL/L
PROT SERPL-MCNC: 7.7 G/DL
SODIUM SERPL-SCNC: 138 MMOL/L

## 2020-12-16 ENCOUNTER — TRANSCRIPTION ENCOUNTER (OUTPATIENT)
Age: 80
End: 2020-12-16

## 2021-01-25 ENCOUNTER — OUTPATIENT (OUTPATIENT)
Dept: OUTPATIENT SERVICES | Facility: HOSPITAL | Age: 81
LOS: 1 days | Discharge: HOME | End: 2021-01-25

## 2021-01-25 DIAGNOSIS — D72.89 OTHER SPECIFIED DISORDERS OF WHITE BLOOD CELLS: ICD-10-CM

## 2021-01-29 ENCOUNTER — RESULT REVIEW (OUTPATIENT)
Age: 81
End: 2021-01-29

## 2021-01-29 ENCOUNTER — APPOINTMENT (OUTPATIENT)
Dept: HEMATOLOGY ONCOLOGY | Facility: CLINIC | Age: 81
End: 2021-01-29

## 2021-01-29 LAB
BASOPHILS # BLD AUTO: 0.05 K/UL — SIGNIFICANT CHANGE UP (ref 0–0.2)
BASOPHILS NFR BLD AUTO: 0.8 % — SIGNIFICANT CHANGE UP (ref 0–2)
EOSINOPHIL # BLD AUTO: 0.26 K/UL — SIGNIFICANT CHANGE UP (ref 0–0.5)
EOSINOPHIL NFR BLD AUTO: 4.4 % — SIGNIFICANT CHANGE UP (ref 0–6)
HCT VFR BLD CALC: 35.2 % — SIGNIFICANT CHANGE UP (ref 34.5–45)
HGB BLD-MCNC: 11.6 G/DL — SIGNIFICANT CHANGE UP (ref 11.5–15.5)
IMM GRANULOCYTES NFR BLD AUTO: 0.3 % — SIGNIFICANT CHANGE UP (ref 0–1.5)
LYMPHOCYTES # BLD AUTO: 2.02 K/UL — SIGNIFICANT CHANGE UP (ref 1–3.3)
LYMPHOCYTES # BLD AUTO: 33.9 % — SIGNIFICANT CHANGE UP (ref 13–44)
MCHC RBC-ENTMCNC: 33 G/DL — SIGNIFICANT CHANGE UP (ref 32–36)
MCHC RBC-ENTMCNC: 35.4 PG — HIGH (ref 27–34)
MCV RBC AUTO: 107.3 FL — HIGH (ref 80–100)
MONOCYTES # BLD AUTO: 0.49 K/UL — SIGNIFICANT CHANGE UP (ref 0–0.9)
MONOCYTES NFR BLD AUTO: 8.2 % — SIGNIFICANT CHANGE UP (ref 2–14)
NEUTROPHILS # BLD AUTO: 3.11 K/UL — SIGNIFICANT CHANGE UP (ref 1.8–7.4)
NEUTROPHILS NFR BLD AUTO: 52.4 % — SIGNIFICANT CHANGE UP (ref 43–77)
NRBC # BLD: 0 /100 WBCS — SIGNIFICANT CHANGE UP (ref 0–0)
PLATELET # BLD AUTO: 246 K/UL — SIGNIFICANT CHANGE UP (ref 150–400)
RBC # BLD: 3.28 M/UL — LOW (ref 3.8–5.2)
RBC # FLD: 12.9 % — SIGNIFICANT CHANGE UP (ref 10.3–14.5)
WBC # BLD: 5.95 K/UL — SIGNIFICANT CHANGE UP (ref 3.8–10.5)
WBC # FLD AUTO: 5.95 K/UL — SIGNIFICANT CHANGE UP (ref 3.8–10.5)

## 2021-02-01 ENCOUNTER — APPOINTMENT (OUTPATIENT)
Dept: HEMATOLOGY ONCOLOGY | Facility: CLINIC | Age: 81
End: 2021-02-01
Payer: MEDICARE

## 2021-02-01 LAB
ALBUMIN SERPL ELPH-MCNC: 4.2 G/DL
ALP BLD-CCNC: 187 U/L
ALT SERPL-CCNC: 33 U/L
ANION GAP SERPL CALC-SCNC: 12 MMOL/L
AST SERPL-CCNC: 72 U/L
BILIRUB SERPL-MCNC: 0.4 MG/DL
BUN SERPL-MCNC: 14 MG/DL
CALCIUM SERPL-MCNC: 9.2 MG/DL
CHLORIDE SERPL-SCNC: 101 MMOL/L
CO2 SERPL-SCNC: 26 MMOL/L
CREAT SERPL-MCNC: 1.1 MG/DL
FERRITIN SERPL-MCNC: 928 NG/ML
GLUCOSE SERPL-MCNC: 130 MG/DL
IRON SATN MFR SERPL: 19 %
IRON SERPL-MCNC: 55 UG/DL
POTASSIUM SERPL-SCNC: 4 MMOL/L
PROT SERPL-MCNC: 7.6 G/DL
SODIUM SERPL-SCNC: 139 MMOL/L
TIBC SERPL-MCNC: 284 UG/DL
TRANSFERRIN SERPL-MCNC: 231 MG/DL
UIBC SERPL-MCNC: 229 UG/DL

## 2021-02-01 PROCEDURE — 99213 OFFICE O/P EST LOW 20 MIN: CPT | Mod: 95

## 2021-02-02 NOTE — PHYSICAL EXAM
[Restricted in physically strenuous activity but ambulatory and able to carry out work of a light or sedentary nature] : Status 1- Restricted in physically strenuous activity but ambulatory and able to carry out work of a light or sedentary nature, e.g., light house work, office work [Normal] : affect appropriate [de-identified] : bluish nails

## 2021-02-02 NOTE — ASSESSMENT
[FreeTextEntry1] : 79 yo woman originally from [facility] with PMhx of  HTN, HLD, with essential thrombocytosis. \par \par Patient has been feeling tired, denies fevers, night sweats, stable weight. Denies bleeding or thrombosis. \par \par 7/1/2020 WBC 15.8, Hb 11.8 g/dl, Hct 39.1%, MCV 78.2, , neutrophils 8753, myelocytes 474, basophils 932.\par \par Bone marrow biopsy 7/6/2020:\par hypercellular bone marrow with trilineage hematopoiesis and increase proportion of hyperlobated megakaryocytes, favor essential thrombocytosis. \par Normal FISH for PDGFRA\par Normal female karyotype. 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 \par \par Decrease  hydroxyurea to  500 mg every other day. PLT count 246K 1/29/2021. \par \par Iron deficiency anemia: completed  Feraheme 510 mg weekly x 2 doses. Iron levels WNL. \par \par This service was provided by using telehealth. The patient was at home and I was at [facility] [place]. The patient requested and participated in this encounter. The encounter face to face last  20  minutes coordinating his/her care and counseling.\par \par \par \par RTC  2  months. \par

## 2021-02-02 NOTE — HISTORY OF PRESENT ILLNESS
[0 - No Distress] : Distress Level: 0 [Other:____] : [unfilled] [Home] : at home, [unfilled] , at the time of the visit. [Medical Office: ([facility])___] : at the medical office located in  [Verbal consent obtained from patient] : the patient, [unfilled] [de-identified] : 79 yo woman originally from [facility] with PMhx of  HTN, HLD, referred for thrombocytosis.\par \par Patient has been feeling tired, denies fevers, night sweats, positive for  weight loss 10 pounds /6 months. Denies bleeding or thrombosis. \par \par Patient had a recent adverse reaction to atorvastatin, generalized muscle pain, she discontinued the medication on 7/7/2020. \par \par Patient lives with her daughter. \par \par 7/1/2020 WBC 15.8, Hb 11.8 g/dl, Hct 39.1%, MCV 78.2, , neutrophils 8753, myelocytes 474, basophils 932.\par \par Bone marrow biopsy 7/6/2020:\par hypercellular bone marrow with trilineage hematopoiesis and increase proportion of hyperlobated megakaryocytes, favor essential thrombocytosis. \par Normal FISH for PDGFRA\par Normal female karyotype. 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 \par  [de-identified] : Bone marrow biopsy 7/6/2020:\par hypercellular bone marrow with trilineage hematopoiesis and increase proportion of hyperlobated megakaryocytes, favor essential thrombocytosis. \par Normal FISH for PDGFRA\par Normal female karyotype. 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 \par \par Patient is feeling well, denies fevers, night sweats or weight loss. \par \par Patient is on hydroxyurea 500 mg daily, tolerate well. PLT 236K 1/29/2021. \par \par Patient was seen via Telehealth, patient consented to encounter. \par \par Patient complaints of having bluish nails. \par \par No other changes in medical, surgical or social history since 11//2020. \par

## 2021-02-02 NOTE — REVIEW OF SYSTEMS
[Lower Ext Edema] : lower extremity edema [Joint Pain] : joint pain [Negative] : Allergic/Immunologic [Recent Change In Weight] : ~T no recent weight change [Chest Pain] : no chest pain [Palpitations] : no palpitations [Leg Claudication] : no intermittent leg claudication [FreeTextEntry9] : right knee pain for several years.

## 2021-02-02 NOTE — PROCEDURE
[Bone Marrow Biopsy] : bone marrow biopsy [Bone Marrow Aspiration] : bone marrow aspiration  [Patient] : the patient [Verbal Consent Obtained] : verbal consent was obtained prior to the procedure [Patient identification verified] : patient identification verified [Procedure verified and consent obtained] : procedure verified and consent obtained [Laterality verified and correct site marked] : laterality verified and correct site marked [Right] : site: right [Correct positioning] : correct positioning [Prone] : prone [Superior iliac spine was identified] : the superior iliac spine was identified. [The right posterior iliac crest was prepped with betadine and draped, using sterile technique.] : The right posterior iliac crest was prepped with betadine and draped, using sterile technique. [Aspirate] : aspirate [Lidocaine was injected and into the periosteum overlying the site.] : Lidocaine was injected and into the periosteum overlying the site. [Cytogenetics] : cytogenetics [FISH] : FISH [Biopsy] : biopsy [Flow Cytometry] : flow cytometry [] : The patient was instructed to remove the bandage the following AM. The patient may bathe. Acetaminophen may be taken for discomfort, as per package directions.If there are any other problems, the patient was instructed to call the office. The patient verbalized understanding, and is aware of the office contact numbers. [FreeTextEntry1] : R/O MDS versus MPD

## 2021-04-01 ENCOUNTER — OUTPATIENT (OUTPATIENT)
Dept: OUTPATIENT SERVICES | Facility: HOSPITAL | Age: 81
LOS: 1 days | Discharge: HOME | End: 2021-04-01

## 2021-04-01 DIAGNOSIS — D72.89 OTHER SPECIFIED DISORDERS OF WHITE BLOOD CELLS: ICD-10-CM

## 2021-04-06 ENCOUNTER — RESULT REVIEW (OUTPATIENT)
Age: 81
End: 2021-04-06

## 2021-04-06 ENCOUNTER — APPOINTMENT (OUTPATIENT)
Dept: HEMATOLOGY ONCOLOGY | Facility: CLINIC | Age: 81
End: 2021-04-06
Payer: MEDICARE

## 2021-04-06 VITALS
SYSTOLIC BLOOD PRESSURE: 145 MMHG | DIASTOLIC BLOOD PRESSURE: 97 MMHG | WEIGHT: 143.96 LBS | OXYGEN SATURATION: 99 % | BODY MASS INDEX: 27.53 KG/M2 | RESPIRATION RATE: 17 BRPM | TEMPERATURE: 97.8 F | HEIGHT: 60.63 IN | HEART RATE: 88 BPM

## 2021-04-06 LAB
BASOPHILS # BLD AUTO: 0.11 K/UL — SIGNIFICANT CHANGE UP (ref 0–0.2)
BASOPHILS NFR BLD AUTO: 1.4 % — SIGNIFICANT CHANGE UP (ref 0–2)
EOSINOPHIL # BLD AUTO: 0.58 K/UL — HIGH (ref 0–0.5)
EOSINOPHIL NFR BLD AUTO: 7.4 % — HIGH (ref 0–6)
HCT VFR BLD CALC: 36.9 % — SIGNIFICANT CHANGE UP (ref 34.5–45)
HGB BLD-MCNC: 11.8 G/DL — SIGNIFICANT CHANGE UP (ref 11.5–15.5)
IMM GRANULOCYTES NFR BLD AUTO: 0.8 % — SIGNIFICANT CHANGE UP (ref 0–1.5)
LYMPHOCYTES # BLD AUTO: 2.41 K/UL — SIGNIFICANT CHANGE UP (ref 1–3.3)
LYMPHOCYTES # BLD AUTO: 30.9 % — SIGNIFICANT CHANGE UP (ref 13–44)
MCHC RBC-ENTMCNC: 32 G/DL — SIGNIFICANT CHANGE UP (ref 32–36)
MCHC RBC-ENTMCNC: 33.2 PG — SIGNIFICANT CHANGE UP (ref 27–34)
MCV RBC AUTO: 103.9 FL — HIGH (ref 80–100)
MONOCYTES # BLD AUTO: 0.75 K/UL — SIGNIFICANT CHANGE UP (ref 0–0.9)
MONOCYTES NFR BLD AUTO: 9.6 % — SIGNIFICANT CHANGE UP (ref 2–14)
NEUTROPHILS # BLD AUTO: 3.9 K/UL — SIGNIFICANT CHANGE UP (ref 1.8–7.4)
NEUTROPHILS NFR BLD AUTO: 49.9 % — SIGNIFICANT CHANGE UP (ref 43–77)
NRBC # BLD: 0 /100 WBCS — SIGNIFICANT CHANGE UP (ref 0–0)
PLATELET # BLD AUTO: 405 K/UL — HIGH (ref 150–400)
RBC # BLD: 3.55 M/UL — LOW (ref 3.8–5.2)
RBC # FLD: 13.7 % — SIGNIFICANT CHANGE UP (ref 10.3–14.5)
WBC # BLD: 7.81 K/UL — SIGNIFICANT CHANGE UP (ref 3.8–10.5)
WBC # FLD AUTO: 7.81 K/UL — SIGNIFICANT CHANGE UP (ref 3.8–10.5)

## 2021-04-06 PROCEDURE — 99072 ADDL SUPL MATRL&STAF TM PHE: CPT

## 2021-04-06 PROCEDURE — 99213 OFFICE O/P EST LOW 20 MIN: CPT

## 2021-04-07 LAB
ALBUMIN SERPL ELPH-MCNC: 4.4 G/DL
ALP BLD-CCNC: 182 U/L
ALT SERPL-CCNC: 27 U/L
ANION GAP SERPL CALC-SCNC: 14 MMOL/L
AST SERPL-CCNC: 47 U/L
BILIRUB SERPL-MCNC: 0.4 MG/DL
BUN SERPL-MCNC: 18 MG/DL
CALCIUM SERPL-MCNC: 9.6 MG/DL
CHLORIDE SERPL-SCNC: 100 MMOL/L
CO2 SERPL-SCNC: 28 MMOL/L
CREAT SERPL-MCNC: 0.95 MG/DL
FERRITIN SERPL-MCNC: 814 NG/ML
FOLATE SERPL-MCNC: 8.2 NG/ML
GLUCOSE SERPL-MCNC: 95 MG/DL
IRON SATN MFR SERPL: 20 %
IRON SERPL-MCNC: 56 UG/DL
LDH SERPL-CCNC: 234 U/L
POTASSIUM SERPL-SCNC: 4.6 MMOL/L
PROT SERPL-MCNC: 7.8 G/DL
SODIUM SERPL-SCNC: 141 MMOL/L
TIBC SERPL-MCNC: 285 UG/DL
UIBC SERPL-MCNC: 228 UG/DL
VIT B12 SERPL-MCNC: 572 PG/ML

## 2021-04-07 NOTE — ASSESSMENT
[FreeTextEntry1] : 80 yo woman originally from [facility] with PMhx of  HTN, HLD, and essential thrombocytosis- on Hydrea 500mg every other day since 2/1/21. Presents for follow up, with pruritic rash to upper back and R chest.\par \par Bone marrow biopsy 7/6/2020:\par hypercellular bone marrow with trilineage hematopoiesis and increase proportion of hyper lobated megakaryocytes, favor essential thrombocytosis. \par Normal FISH for PDGFRA\par Normal female karyotype. 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 \par \par Plan\par --PLT today 405, advised increasing Hydrea. Patient to now take Hydrea 500mg Mon- Fri instead of every other day.\par --Advised to stop iron tabs, Ferritin 928 and iron sat 19% on 1/29/21\par --Start OTC Hydrocortisone for skin rash, call office if symptoms persist.\par -- Repeat CBC in 1 month, will arrange home draw \par \par \par RTC in 3 months\par Case and management discussed with Dr. Waller \par

## 2021-04-07 NOTE — PHYSICAL EXAM
[Restricted in physically strenuous activity but ambulatory and able to carry out work of a light or sedentary nature] : Status 1- Restricted in physically strenuous activity but ambulatory and able to carry out work of a light or sedentary nature, e.g., light house work, office work [Normal] : affect appropriate [de-identified] : + 1 BLE to ankles  [de-identified] : + rash and hyperpigmented skin to upper back and L chest. + scratch marks.

## 2021-04-07 NOTE — ADDENDUM
[FreeTextEntry1] : Case reviewed including assessment, plan, laboratory studies with Darcy Starr NP.

## 2021-04-07 NOTE — REVIEW OF SYSTEMS
[Lower Ext Edema] : lower extremity edema [Joint Pain] : joint pain [Negative] : Allergic/Immunologic [Recent Change In Weight] : ~T no recent weight change [Chest Pain] : no chest pain [Palpitations] : no palpitations [Leg Claudication] : no intermittent leg claudication [FreeTextEntry9] : right knee pain for several years.  [de-identified] : bruising to upper back

## 2021-04-07 NOTE — HISTORY OF PRESENT ILLNESS
[0 - No Distress] : Distress Level: 0 [de-identified] : 81 yo woman originally from [facility] with PMhx of  HTN, HLD, referred for thrombocytosis.\par \par Patient has been feeling tired, denies fevers, night sweats, positive for  weight loss 10 pounds /6 months. Denies bleeding or thrombosis. \par \par Patient had a recent adverse reaction to atorvastatin, generalized muscle pain, she discontinued the medication on 7/7/2020. \par \par Patient lives with her daughter. \par \par 7/1/2020 WBC 15.8, Hb 11.8 g/dl, Hct 39.1%, MCV 78.2, , neutrophils 8753, myelocytes 474, basophils 932.\par \par Bone marrow biopsy 7/6/2020:\par hypercellular bone marrow with trilineage hematopoiesis and increase proportion of hyper lobated megakaryocytes, favor essential thrombocytosis. \par Normal FISH for PDGFRA\par Normal female karyotype. 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 \par  [de-identified] : Patient presents for follow up, accompanied by her daughter. Has been taking Hydrea 500mg every other day since last seen, tolerating well. Daughter reports new bruising  to upper back since last week, no injury/trauma. No bleeding to any other sites: denies gum bleeding, epistaxis, hematuria, melena and hematochezia. Feeling well otherwise; no fevers, chills, lightheadedness, SOB, CP, nausea, vomiting, diarrhea or constipation. Has chronic bilateral lower extremity edema, stable. Good appetite, stable weight. Reports taking OTC iron pills 65 Fe per PCP advise for nail issue. \par \par \par No other changes in medical, surgical or social history since 2/1/2021. \par

## 2021-04-15 ENCOUNTER — TRANSCRIPTION ENCOUNTER (OUTPATIENT)
Age: 81
End: 2021-04-15

## 2021-05-04 ENCOUNTER — TRANSCRIPTION ENCOUNTER (OUTPATIENT)
Age: 81
End: 2021-05-04

## 2021-05-04 ENCOUNTER — LABORATORY RESULT (OUTPATIENT)
Age: 81
End: 2021-05-04

## 2021-05-05 ENCOUNTER — NON-APPOINTMENT (OUTPATIENT)
Age: 81
End: 2021-05-05

## 2021-06-01 ENCOUNTER — TRANSCRIPTION ENCOUNTER (OUTPATIENT)
Age: 81
End: 2021-06-01

## 2021-07-23 ENCOUNTER — OUTPATIENT (OUTPATIENT)
Dept: OUTPATIENT SERVICES | Facility: HOSPITAL | Age: 81
LOS: 1 days | Discharge: HOME | End: 2021-07-23

## 2021-07-23 DIAGNOSIS — D72.89 OTHER SPECIFIED DISORDERS OF WHITE BLOOD CELLS: ICD-10-CM

## 2021-07-26 ENCOUNTER — RESULT REVIEW (OUTPATIENT)
Age: 81
End: 2021-07-26

## 2021-07-26 ENCOUNTER — LABORATORY RESULT (OUTPATIENT)
Age: 81
End: 2021-07-26

## 2021-07-26 ENCOUNTER — APPOINTMENT (OUTPATIENT)
Dept: HEMATOLOGY ONCOLOGY | Facility: CLINIC | Age: 81
End: 2021-07-26
Payer: MEDICARE

## 2021-07-26 VITALS
WEIGHT: 142.2 LBS | TEMPERATURE: 97.4 F | BODY MASS INDEX: 26.85 KG/M2 | SYSTOLIC BLOOD PRESSURE: 134 MMHG | DIASTOLIC BLOOD PRESSURE: 79 MMHG | HEART RATE: 88 BPM | OXYGEN SATURATION: 99 % | RESPIRATION RATE: 16 BRPM | HEIGHT: 61.02 IN

## 2021-07-26 DIAGNOSIS — Z86.2 PERSONAL HISTORY OF DISEASES OF THE BLOOD AND BLOOD-FORMING ORGANS AND CERTAIN DISORDERS INVOLVING THE IMMUNE MECHANISM: ICD-10-CM

## 2021-07-26 DIAGNOSIS — Z78.9 OTHER SPECIFIED HEALTH STATUS: ICD-10-CM

## 2021-07-26 LAB
BASOPHILS # BLD AUTO: 0.17 K/UL — SIGNIFICANT CHANGE UP (ref 0–0.2)
BASOPHILS NFR BLD AUTO: 1.7 % — SIGNIFICANT CHANGE UP (ref 0–2)
EOSINOPHIL # BLD AUTO: 0.52 K/UL — HIGH (ref 0–0.5)
EOSINOPHIL NFR BLD AUTO: 5.1 % — SIGNIFICANT CHANGE UP (ref 0–6)
HCT VFR BLD CALC: 39.7 % — SIGNIFICANT CHANGE UP (ref 34.5–45)
HGB BLD-MCNC: 13.1 G/DL — SIGNIFICANT CHANGE UP (ref 11.5–15.5)
IMM GRANULOCYTES NFR BLD AUTO: 1 % — SIGNIFICANT CHANGE UP (ref 0–1.5)
LYMPHOCYTES # BLD AUTO: 2.68 K/UL — SIGNIFICANT CHANGE UP (ref 1–3.3)
LYMPHOCYTES # BLD AUTO: 26.3 % — SIGNIFICANT CHANGE UP (ref 13–44)
MCHC RBC-ENTMCNC: 32.6 PG — SIGNIFICANT CHANGE UP (ref 27–34)
MCHC RBC-ENTMCNC: 33 G/DL — SIGNIFICANT CHANGE UP (ref 32–36)
MCV RBC AUTO: 98.8 FL — SIGNIFICANT CHANGE UP (ref 80–100)
MONOCYTES # BLD AUTO: 0.95 K/UL — HIGH (ref 0–0.9)
MONOCYTES NFR BLD AUTO: 9.3 % — SIGNIFICANT CHANGE UP (ref 2–14)
NEUTROPHILS # BLD AUTO: 5.77 K/UL — SIGNIFICANT CHANGE UP (ref 1.8–7.4)
NEUTROPHILS NFR BLD AUTO: 56.6 % — SIGNIFICANT CHANGE UP (ref 43–77)
NRBC # BLD: 0 /100 WBCS — SIGNIFICANT CHANGE UP (ref 0–0)
PLATELET # BLD AUTO: 487 K/UL — HIGH (ref 150–400)
RBC # BLD: 4.02 M/UL — SIGNIFICANT CHANGE UP (ref 3.8–5.2)
RBC # FLD: 15.8 % — HIGH (ref 10.3–14.5)
WBC # BLD: 10.19 K/UL — SIGNIFICANT CHANGE UP (ref 3.8–10.5)
WBC # FLD AUTO: 10.19 K/UL — SIGNIFICANT CHANGE UP (ref 3.8–10.5)

## 2021-07-26 PROCEDURE — 99213 OFFICE O/P EST LOW 20 MIN: CPT

## 2021-07-27 LAB
ALBUMIN SERPL ELPH-MCNC: 4.3 G/DL
ALP BLD-CCNC: 145 U/L
ALT SERPL-CCNC: 7 U/L
ANION GAP SERPL CALC-SCNC: 13 MMOL/L
AST SERPL-CCNC: 19 U/L
BILIRUB SERPL-MCNC: 0.4 MG/DL
BUN SERPL-MCNC: 18 MG/DL
CALCIUM SERPL-MCNC: 9.7 MG/DL
CHLORIDE SERPL-SCNC: 98 MMOL/L
CO2 SERPL-SCNC: 27 MMOL/L
CREAT SERPL-MCNC: 1.08 MG/DL
GLUCOSE SERPL-MCNC: 95 MG/DL
LDH SERPL-CCNC: 246 U/L
POTASSIUM SERPL-SCNC: 4.4 MMOL/L
PROT SERPL-MCNC: 7.6 G/DL
SODIUM SERPL-SCNC: 138 MMOL/L

## 2021-07-28 NOTE — PHYSICAL EXAM
[Restricted in physically strenuous activity but ambulatory and able to carry out work of a light or sedentary nature] : Status 1- Restricted in physically strenuous activity but ambulatory and able to carry out work of a light or sedentary nature, e.g., light house work, office work [Normal] : affect appropriate [de-identified] : + 1 BLE to ankles  [de-identified] : hyperpigmentation to upper back

## 2021-07-28 NOTE — REVIEW OF SYSTEMS
[Lower Ext Edema] : lower extremity edema [Joint Pain] : joint pain [Negative] : Allergic/Immunologic [Recent Change In Weight] : ~T no recent weight change [Chest Pain] : no chest pain [Palpitations] : no palpitations [Leg Claudication] : no intermittent leg claudication [FreeTextEntry9] : right knee pain for several years.  [de-identified] : bruising to upper back

## 2021-07-28 NOTE — HISTORY OF PRESENT ILLNESS
[de-identified] : 79 yo woman originally from [facility] with PMhx of  HTN, HLD, referred for thrombocytosis.\par \par Patient has been feeling tired, denies fevers, night sweats, positive for  weight loss 10 pounds /6 months. Denies bleeding or thrombosis. \par \par Patient had a recent adverse reaction to atorvastatin, generalized muscle pain, she discontinued the medication on 7/7/2020. \par \par Patient lives with her daughter. \par \par 7/1/2020 WBC 15.8, Hb 11.8 g/dl, Hct 39.1%, MCV 78.2, , neutrophils 8753, myelocytes 474, basophils 932.\par \par Bone marrow biopsy 7/6/2020:\par hypercellular bone marrow with trilineage hematopoiesis and increase proportion of hyper lobated megakaryocytes, favor essential thrombocytosis. \par Normal FISH for PDGFRA\par Normal female karyotype. 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 \par  [de-identified] : Patient presents for follow up, accompanied by her daughter. Has been taking Hydrea 500mg Mon-Fri since last seen. Reports feeling well with no acute complaints. Itching has resolved. Has chronic insomnia but unwilling to take medications for this. \par \par \par No other changes in medical, surgical or social history since 4/6/2021. \par

## 2021-07-28 NOTE — ASSESSMENT
[FreeTextEntry1] : 80 yo woman originally from [facility] with PMhx of  HTN, HLD, and essential thrombocytosis- on Hydrea 500mg Mon-Fri since last seen, tolerating well.\par \par Bone marrow biopsy 7/6/2020:\par hypercellular bone marrow with trilineage hematopoiesis and increase proportion of hyper lobated megakaryocytes, favor essential thrombocytosis. \par Normal FISH for PDGFRA\par Normal female karyotype. 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 \par \par Plan\par --PLT today 487, will continue current dose of Hydrea. \par \par \par RTC in 3 months\par Case and management discussed with Dr. Waller \par

## 2021-10-04 ENCOUNTER — OUTPATIENT (OUTPATIENT)
Dept: OUTPATIENT SERVICES | Facility: HOSPITAL | Age: 81
LOS: 1 days | Discharge: HOME | End: 2021-10-04

## 2021-10-04 DIAGNOSIS — D47.3 ESSENTIAL (HEMORRHAGIC) THROMBOCYTHEMIA: ICD-10-CM

## 2021-10-18 ENCOUNTER — NON-APPOINTMENT (OUTPATIENT)
Age: 81
End: 2021-10-18

## 2021-10-18 ENCOUNTER — OUTPATIENT (OUTPATIENT)
Dept: OUTPATIENT SERVICES | Facility: HOSPITAL | Age: 81
LOS: 1 days | Discharge: HOME | End: 2021-10-18

## 2021-10-18 DIAGNOSIS — D72.89 OTHER SPECIFIED DISORDERS OF WHITE BLOOD CELLS: ICD-10-CM

## 2021-10-19 ENCOUNTER — APPOINTMENT (OUTPATIENT)
Dept: HEMATOLOGY ONCOLOGY | Facility: CLINIC | Age: 81
End: 2021-10-19
Payer: MEDICARE

## 2021-10-19 PROCEDURE — 99447 NTRPROF PH1/NTRNET/EHR 11-20: CPT

## 2021-10-19 NOTE — HISTORY OF PRESENT ILLNESS
[Home] : at home, [unfilled] , at the time of the visit. [Medical Office: ([facility])___] : at the medical office located in  [Verbal consent obtained from patient] : the patient, [unfilled] [0 - No Distress] : Distress Level: 0 [de-identified] : 82 yo woman originally from [facility] with PMhx of  HTN, HLD, referred for thrombocytosis.\par \par Patient has been feeling tired, denies fevers, night sweats, positive for  weight loss 10 pounds /6 months. Denies bleeding or thrombosis. \par \par Patient had a recent adverse reaction to atorvastatin, generalized muscle pain, she discontinued the medication on 7/7/2020. \par \par Patient lives with her daughter. \par \par 7/1/2020 WBC 15.8, Hb 11.8 g/dl, Hct 39.1%, MCV 78.2, , neutrophils 8753, myelocytes 474, basophils 932.\par \par Bone marrow biopsy 7/6/2020:\par hypercellular bone marrow with trilineage hematopoiesis and increase proportion of hyper lobated megakaryocytes, favor essential thrombocytosis. \par Normal FISH for PDGFRA\par Normal female karyotype. 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 \par  [de-identified] : Patient presents for follow up, accompanied by her daughter. Has been taking Hydrea 500mg Mon-Fri since last seen. Reports feeling well with no acute complaints. Itching has resolved. Has chronic insomnia but unwilling to take medications for this. \par 10/2021- . \par \par No other changes in medical, surgical or social history since 7/26/21. \par

## 2021-10-19 NOTE — ASSESSMENT
[FreeTextEntry1] : 80 yo woman originally from [facility] with PMhx of  HTN, HLD, and essential thrombocytosis- on Hydrea 500mg Mon-Fri since last seen, tolerating well.\par \par Bone marrow biopsy 7/6/2020:\par hypercellular bone marrow with trilineage hematopoiesis and increase proportion of hyper lobated megakaryocytes, favor essential thrombocytosis. \par Normal FISH for PDGFRA\par Normal female karyotype. 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 \par \par Plan\par --PLT today 487, will continue current dose of Hydrea. \par - 10/2021- . Continue same dose of hydroxyurea. \par \par This service was provided by using telehealth. The patient was at home and I was at [facility] The patient requested and participated in this encounter. The encounter last  20  minutes coordinating his/her care and counseling.\par \par \par RTC in 3 months\par \par

## 2021-10-19 NOTE — REVIEW OF SYSTEMS
[Recent Change In Weight] : ~T no recent weight change [Chest Pain] : no chest pain [Palpitations] : no palpitations [Leg Claudication] : no intermittent leg claudication [FreeTextEntry9] : right knee pain for several years.  [de-identified] : bruising to upper back

## 2021-10-22 ENCOUNTER — TRANSCRIPTION ENCOUNTER (OUTPATIENT)
Age: 81
End: 2021-10-22

## 2022-01-10 ENCOUNTER — TRANSCRIPTION ENCOUNTER (OUTPATIENT)
Age: 82
End: 2022-01-10

## 2022-02-24 ENCOUNTER — RESULT REVIEW (OUTPATIENT)
Age: 82
End: 2022-02-24

## 2022-02-24 ENCOUNTER — APPOINTMENT (OUTPATIENT)
Dept: HEMATOLOGY ONCOLOGY | Facility: CLINIC | Age: 82
End: 2022-02-24

## 2022-02-24 ENCOUNTER — OUTPATIENT (OUTPATIENT)
Dept: OUTPATIENT SERVICES | Facility: HOSPITAL | Age: 82
LOS: 1 days | Discharge: HOME | End: 2022-02-24

## 2022-02-24 DIAGNOSIS — D72.89 OTHER SPECIFIED DISORDERS OF WHITE BLOOD CELLS: ICD-10-CM

## 2022-02-24 LAB
BASOPHILS # BLD AUTO: 0.26 K/UL — HIGH (ref 0–0.2)
BASOPHILS NFR BLD AUTO: 2.1 % — HIGH (ref 0–2)
EOSINOPHIL # BLD AUTO: 0.88 K/UL — HIGH (ref 0–0.5)
EOSINOPHIL NFR BLD AUTO: 7 % — HIGH (ref 0–6)
HCT VFR BLD CALC: 36.6 % — SIGNIFICANT CHANGE UP (ref 34.5–45)
HGB BLD-MCNC: 12.1 G/DL — SIGNIFICANT CHANGE UP (ref 11.5–15.5)
IMM GRANULOCYTES NFR BLD AUTO: 1.5 % — SIGNIFICANT CHANGE UP (ref 0–1.5)
LYMPHOCYTES # BLD AUTO: 16.5 % — SIGNIFICANT CHANGE UP (ref 13–44)
LYMPHOCYTES # BLD AUTO: 2.08 K/UL — SIGNIFICANT CHANGE UP (ref 1–3.3)
MCHC RBC-ENTMCNC: 32.1 PG — SIGNIFICANT CHANGE UP (ref 27–34)
MCHC RBC-ENTMCNC: 33.1 G/DL — SIGNIFICANT CHANGE UP (ref 32–36)
MCV RBC AUTO: 97.1 FL — SIGNIFICANT CHANGE UP (ref 80–100)
MONOCYTES # BLD AUTO: 0.95 K/UL — HIGH (ref 0–0.9)
MONOCYTES NFR BLD AUTO: 7.5 % — SIGNIFICANT CHANGE UP (ref 2–14)
NEUTROPHILS # BLD AUTO: 8.25 K/UL — HIGH (ref 1.8–7.4)
NEUTROPHILS NFR BLD AUTO: 65.4 % — SIGNIFICANT CHANGE UP (ref 43–77)
NRBC # BLD: 0 /100 WBCS — SIGNIFICANT CHANGE UP (ref 0–0)
PLATELET # BLD AUTO: 754 K/UL — HIGH (ref 150–400)
RBC # BLD: 3.77 M/UL — LOW (ref 3.8–5.2)
RBC # FLD: 15.3 % — HIGH (ref 10.3–14.5)
WBC # BLD: 12.61 K/UL — HIGH (ref 3.8–10.5)
WBC # FLD AUTO: 12.61 K/UL — HIGH (ref 3.8–10.5)

## 2022-02-25 LAB
ALBUMIN SERPL ELPH-MCNC: 4.3 G/DL
ALP BLD-CCNC: 130 U/L
ALT SERPL-CCNC: 9 U/L
ANION GAP SERPL CALC-SCNC: 16 MMOL/L
AST SERPL-CCNC: 31 U/L
BILIRUB SERPL-MCNC: 0.5 MG/DL
BUN SERPL-MCNC: 14 MG/DL
CALCIUM SERPL-MCNC: 9.4 MG/DL
CHLORIDE SERPL-SCNC: 95 MMOL/L
CO2 SERPL-SCNC: 22 MMOL/L
CREAT SERPL-MCNC: 0.88 MG/DL
FERRITIN SERPL-MCNC: 577 NG/ML
FOLATE SERPL-MCNC: 12.5 NG/ML
GLUCOSE SERPL-MCNC: 106 MG/DL
IRON SATN MFR SERPL: 14 %
IRON SERPL-MCNC: 40 UG/DL
LDH SERPL-CCNC: 355 U/L
POTASSIUM SERPL-SCNC: 4.8 MMOL/L
PROT SERPL-MCNC: 7.5 G/DL
SODIUM SERPL-SCNC: 133 MMOL/L
TIBC SERPL-MCNC: 285 UG/DL
UIBC SERPL-MCNC: 245 UG/DL
VIT B12 SERPL-MCNC: 637 PG/ML

## 2022-03-21 ENCOUNTER — TRANSCRIPTION ENCOUNTER (OUTPATIENT)
Age: 82
End: 2022-03-21

## 2022-04-15 ENCOUNTER — OUTPATIENT (OUTPATIENT)
Dept: OUTPATIENT SERVICES | Facility: HOSPITAL | Age: 82
LOS: 1 days | Discharge: HOME | End: 2022-04-15

## 2022-04-15 DIAGNOSIS — D72.89 OTHER SPECIFIED DISORDERS OF WHITE BLOOD CELLS: ICD-10-CM

## 2022-04-21 ENCOUNTER — RESULT REVIEW (OUTPATIENT)
Age: 82
End: 2022-04-21

## 2022-04-21 ENCOUNTER — APPOINTMENT (OUTPATIENT)
Dept: HEMATOLOGY ONCOLOGY | Facility: CLINIC | Age: 82
End: 2022-04-21
Payer: MEDICARE

## 2022-04-21 VITALS
RESPIRATION RATE: 18 BRPM | OXYGEN SATURATION: 94 % | BODY MASS INDEX: 27.06 KG/M2 | HEIGHT: 61.02 IN | HEART RATE: 94 BPM | TEMPERATURE: 97 F | DIASTOLIC BLOOD PRESSURE: 84 MMHG | WEIGHT: 143.3 LBS | SYSTOLIC BLOOD PRESSURE: 153 MMHG

## 2022-04-21 LAB
ALBUMIN SERPL ELPH-MCNC: 4.2 G/DL
ALP BLD-CCNC: 123 U/L
ALT SERPL-CCNC: 8 U/L
ANION GAP SERPL CALC-SCNC: 13 MMOL/L
AST SERPL-CCNC: 19 U/L
BASOPHILS # BLD AUTO: 0.43 K/UL — HIGH (ref 0–0.2)
BASOPHILS NFR BLD AUTO: 2.5 % — HIGH (ref 0–2)
BILIRUB SERPL-MCNC: 0.3 MG/DL
BUN SERPL-MCNC: 14 MG/DL
CALCIUM SERPL-MCNC: 9.7 MG/DL
CHLORIDE SERPL-SCNC: 96 MMOL/L
CO2 SERPL-SCNC: 24 MMOL/L
CREAT SERPL-MCNC: 1.14 MG/DL
EGFR: 48 ML/MIN/1.73M2
EOSINOPHIL # BLD AUTO: 1.05 K/UL — HIGH (ref 0–0.5)
EOSINOPHIL NFR BLD AUTO: 6.2 % — HIGH (ref 0–6)
GIANT PLATELETS BLD QL SMEAR: PRESENT — SIGNIFICANT CHANGE UP
GLUCOSE SERPL-MCNC: 69 MG/DL
HCT VFR BLD CALC: 42.9 % — SIGNIFICANT CHANGE UP (ref 34.5–45)
HGB BLD-MCNC: 13.4 G/DL — SIGNIFICANT CHANGE UP (ref 11.5–15.5)
IMM GRANULOCYTES NFR BLD AUTO: 2.6 % — HIGH (ref 0–1.5)
LDH SERPL-CCNC: 381 U/L
LG PLATELETS BLD QL AUTO: SIGNIFICANT CHANGE UP
LYMPHOCYTES # BLD AUTO: 17.1 % — SIGNIFICANT CHANGE UP (ref 13–44)
LYMPHOCYTES # BLD AUTO: 2.9 K/UL — SIGNIFICANT CHANGE UP (ref 1–3.3)
MCHC RBC-ENTMCNC: 29.5 PG — SIGNIFICANT CHANGE UP (ref 27–34)
MCHC RBC-ENTMCNC: 31.2 G/DL — LOW (ref 32–36)
MCV RBC AUTO: 94.5 FL — SIGNIFICANT CHANGE UP (ref 80–100)
MONOCYTES # BLD AUTO: 1.62 K/UL — HIGH (ref 0–0.9)
MONOCYTES NFR BLD AUTO: 9.5 % — SIGNIFICANT CHANGE UP (ref 2–14)
NEUTROPHILS # BLD AUTO: 10.54 K/UL — HIGH (ref 1.8–7.4)
NEUTROPHILS NFR BLD AUTO: 62.1 % — SIGNIFICANT CHANGE UP (ref 43–77)
NRBC # BLD: 0 /100 WBCS — SIGNIFICANT CHANGE UP (ref 0–0)
PLAT MORPH BLD: NORMAL — SIGNIFICANT CHANGE UP
PLATELET # BLD AUTO: 1113 K/UL — CRITICAL HIGH (ref 150–400)
POTASSIUM SERPL-SCNC: 5.1 MMOL/L
PROT SERPL-MCNC: 7.6 G/DL
RBC # BLD: 4.54 M/UL — SIGNIFICANT CHANGE UP (ref 3.8–5.2)
RBC # FLD: 16.1 % — HIGH (ref 10.3–14.5)
RBC BLD AUTO: SIGNIFICANT CHANGE UP
SODIUM SERPL-SCNC: 134 MMOL/L
WBC # BLD: 16.99 K/UL — HIGH (ref 3.8–10.5)
WBC # FLD AUTO: 16.99 K/UL — HIGH (ref 3.8–10.5)

## 2022-04-21 PROCEDURE — 99214 OFFICE O/P EST MOD 30 MIN: CPT

## 2022-04-21 RX ORDER — HYDROXYUREA 500 MG/1
500 CAPSULE ORAL
Qty: 25 | Refills: 2 | Status: DISCONTINUED | COMMUNITY
Start: 2021-06-01 | End: 2022-04-21

## 2022-04-21 RX ORDER — AMLODIPINE BESYLATE 10 MG/1
10 TABLET ORAL
Qty: 90 | Refills: 0 | Status: DISCONTINUED | COMMUNITY
Start: 2020-11-05 | End: 2022-04-21

## 2022-04-21 RX ORDER — HYDROXYUREA 500 MG/1
500 CAPSULE ORAL TWICE DAILY
Qty: 60 | Refills: 10 | Status: DISCONTINUED | COMMUNITY
Start: 2020-07-09 | End: 2022-04-21

## 2022-04-21 RX ORDER — HYDROXYUREA 500 MG/1
500 CAPSULE ORAL DAILY
Qty: 60 | Refills: 6 | Status: DISCONTINUED | COMMUNITY
Start: 2020-08-24 | End: 2022-04-21

## 2022-04-21 RX ORDER — HYDROCHLOROTHIAZIDE 12.5 MG/1
12.5 CAPSULE ORAL
Refills: 0 | Status: DISCONTINUED | COMMUNITY
End: 2022-04-21

## 2022-04-21 NOTE — HISTORY OF PRESENT ILLNESS
[0 - No Distress] : Distress Level: 0 [Medical Office: ([facility])___] : at the medical office located in  [80: Normal activity with effort; some signs or symptoms of disease.] : 80: Normal activity with effort; some signs or symptoms of disease.  [de-identified] : 82 yo woman originally from [facility] with PMhx of  HTN, HLD, referred for thrombocytosis.\par \par Patient has been feeling tired, denies fevers, night sweats, positive for  weight loss 10 pounds /6 months. Denies bleeding or thrombosis. \par \par Patient had a recent adverse reaction to atorvastatin, generalized muscle pain, she discontinued the medication on 7/7/2020. \par \par Patient lives with her daughter. \par \par 7/1/2020 WBC 15.8, Hb 11.8 g/dl, Hct 39.1%, MCV 78.2, , neutrophils 8753, myelocytes 474, basophils 932.\par \par Bone marrow biopsy 7/6/2020:\par hypercellular bone marrow with trilineage hematopoiesis and increase proportion of hyper lobated megakaryocytes, favor essential thrombocytosis. \par Normal FISH for PDGFRA\par Normal female karyotype. 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 \par  [de-identified] : Patient presents for follow up, accompanied by her daughter. Patient was started on Eliquis 5 mg q 12 hrs for new diagnosis of a fib. Denies fevers, night sweats or weight loss. \par \par No other changes in medical, surgical or social history since 10/19/21. \par

## 2022-04-21 NOTE — PHYSICAL EXAM
[Restricted in physically strenuous activity but ambulatory and able to carry out work of a light or sedentary nature] : Status 1- Restricted in physically strenuous activity but ambulatory and able to carry out work of a light or sedentary nature, e.g., light house work, office work [Normal] : affect appropriate [de-identified] : Irregular rhythm [de-identified] : + 1 BLE to ankles  [de-identified] : +1 bilateral pitting edema  [de-identified] : blotchy hyperpigmentation midback, dystrophic nails of bilateral hands

## 2022-04-21 NOTE — REVIEW OF SYSTEMS
[Lower Ext Edema] : lower extremity edema [Joint Pain] : joint pain [Negative] : Integumentary [Recent Change In Weight] : ~T no recent weight change [Chest Pain] : no chest pain [Palpitations] : no palpitations [Leg Claudication] : no intermittent leg claudication [FreeTextEntry9] : right knee pain for several years.

## 2022-04-21 NOTE — ASSESSMENT
[FreeTextEntry1] : 83 yo woman originally from [facility] with PMhx of  HTN, HLD, and essential thrombocytosis- on Hydrea 500mg Mon-Fri since last seen, tolerating well.\par \par Bone marrow biopsy 7/6/2020:\par hypercellular bone marrow with trilineage hematopoiesis and increase proportion of hyper lobated megakaryocytes, favor essential thrombocytosis. \par Normal FISH for PDGFRA\par Normal female karyotype. 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 \par \par Plan\par \par Patient discontinued Hydroxyurea . \par --PLT today 1130. \par Start Anagrelide 0.5 mg daily. \par \par RTC in 6  weeks. \par \par

## 2022-05-18 ENCOUNTER — OUTPATIENT (OUTPATIENT)
Dept: OUTPATIENT SERVICES | Facility: HOSPITAL | Age: 82
LOS: 1 days | Discharge: HOME | End: 2022-05-18

## 2022-05-18 DIAGNOSIS — D72.89 OTHER SPECIFIED DISORDERS OF WHITE BLOOD CELLS: ICD-10-CM

## 2022-05-19 ENCOUNTER — TRANSCRIPTION ENCOUNTER (OUTPATIENT)
Age: 82
End: 2022-05-19

## 2022-05-20 ENCOUNTER — TRANSCRIPTION ENCOUNTER (OUTPATIENT)
Age: 82
End: 2022-05-20

## 2022-07-05 ENCOUNTER — RESULT REVIEW (OUTPATIENT)
Age: 82
End: 2022-07-05

## 2022-07-05 ENCOUNTER — APPOINTMENT (OUTPATIENT)
Dept: HEMATOLOGY ONCOLOGY | Facility: CLINIC | Age: 82
End: 2022-07-05

## 2022-07-05 VITALS
HEIGHT: 61.02 IN | OXYGEN SATURATION: 99 % | TEMPERATURE: 97.2 F | HEART RATE: 98 BPM | BODY MASS INDEX: 29.09 KG/M2 | SYSTOLIC BLOOD PRESSURE: 166 MMHG | RESPIRATION RATE: 16 BRPM | DIASTOLIC BLOOD PRESSURE: 87 MMHG | WEIGHT: 154.1 LBS

## 2022-07-05 LAB
ALBUMIN SERPL ELPH-MCNC: 3.8 G/DL
ALP BLD-CCNC: 128 U/L
ALT SERPL-CCNC: 8 U/L
ANION GAP SERPL CALC-SCNC: 9 MMOL/L
AST SERPL-CCNC: 20 U/L
BASOPHILS # BLD AUTO: 0.2 K/UL — SIGNIFICANT CHANGE UP (ref 0–0.2)
BASOPHILS NFR BLD AUTO: 1 % — SIGNIFICANT CHANGE UP (ref 0–2)
BILIRUB SERPL-MCNC: 0.7 MG/DL
BUN SERPL-MCNC: 19 MG/DL
CALCIUM SERPL-MCNC: 9.3 MG/DL
CHLORIDE SERPL-SCNC: 101 MMOL/L
CO2 SERPL-SCNC: 25 MMOL/L
CREAT SERPL-MCNC: 1.31 MG/DL
EGFR: 40 ML/MIN/1.73M2
ELLIPTOCYTES BLD QL SMEAR: SLIGHT — SIGNIFICANT CHANGE UP
EOSINOPHIL # BLD AUTO: 1.96 K/UL — HIGH (ref 0–0.5)
EOSINOPHIL NFR BLD AUTO: 10 % — HIGH (ref 0–6)
GIANT PLATELETS BLD QL SMEAR: PRESENT — SIGNIFICANT CHANGE UP
GLUCOSE SERPL-MCNC: 103 MG/DL
HCT VFR BLD CALC: 41.6 % — SIGNIFICANT CHANGE UP (ref 34.5–45)
HGB BLD-MCNC: 12.9 G/DL — SIGNIFICANT CHANGE UP (ref 11.5–15.5)
LDH SERPL-CCNC: 412 U/L
LG PLATELETS BLD QL AUTO: SLIGHT — SIGNIFICANT CHANGE UP
LYMPHOCYTES # BLD AUTO: 13 % — SIGNIFICANT CHANGE UP (ref 13–44)
LYMPHOCYTES # BLD AUTO: 2.55 K/UL — SIGNIFICANT CHANGE UP (ref 1–3.3)
MCHC RBC-ENTMCNC: 25.9 PG — LOW (ref 27–34)
MCHC RBC-ENTMCNC: 31 G/DL — LOW (ref 32–36)
MCV RBC AUTO: 84.1 FL — SIGNIFICANT CHANGE UP (ref 80–100)
METAMYELOCYTES # FLD: 1 % — HIGH (ref 0–0)
MONOCYTES # BLD AUTO: 1.37 K/UL — HIGH (ref 0–0.9)
MONOCYTES NFR BLD AUTO: 7 % — SIGNIFICANT CHANGE UP (ref 2–14)
MYELOCYTES NFR BLD: 1 % — HIGH (ref 0–0)
NEUTROPHILS # BLD AUTO: 13.13 K/UL — HIGH (ref 1.8–7.4)
NEUTROPHILS NFR BLD AUTO: 67 % — SIGNIFICANT CHANGE UP (ref 43–77)
NRBC # BLD: 0 /100 — SIGNIFICANT CHANGE UP (ref 0–0)
NRBC # BLD: SIGNIFICANT CHANGE UP /100 WBCS (ref 0–0)
PLAT MORPH BLD: NORMAL — SIGNIFICANT CHANGE UP
PLATELET # BLD AUTO: 1102 K/UL — CRITICAL HIGH (ref 150–400)
POIKILOCYTOSIS BLD QL AUTO: SLIGHT — SIGNIFICANT CHANGE UP
POTASSIUM SERPL-SCNC: 5.7 MMOL/L
PROT SERPL-MCNC: 7 G/DL
RBC # BLD: 4.99 M/UL — SIGNIFICANT CHANGE UP (ref 3.8–5.2)
RBC # FLD: 17.2 % — HIGH (ref 10.3–14.5)
RBC BLD AUTO: ABNORMAL
SODIUM SERPL-SCNC: 134 MMOL/L
WBC # BLD: 19.59 K/UL — HIGH (ref 3.8–10.5)
WBC # FLD AUTO: 19.59 K/UL — HIGH (ref 3.8–10.5)

## 2022-07-05 PROCEDURE — 99214 OFFICE O/P EST MOD 30 MIN: CPT

## 2022-07-05 RX ORDER — TIMOLOL MALEATE 5 MG/ML
0.5 SOLUTION OPHTHALMIC
Refills: 0 | Status: DISCONTINUED | COMMUNITY
End: 2022-07-05

## 2022-07-05 NOTE — HISTORY OF PRESENT ILLNESS
[0 - No Distress] : Distress Level: 0 [80: Normal activity with effort; some signs or symptoms of disease.] : 80: Normal activity with effort; some signs or symptoms of disease.  [Medical Office: ([facility])___] : at the medical office located in  [de-identified] : 82 yo woman originally from [facility] with PMhx of  HTN, HLD, referred for thrombocytosis.\par \par Patient has been feeling tired, denies fevers, night sweats, positive for  weight loss 10 pounds /6 months. Denies bleeding or thrombosis. \par \par Patient had a recent adverse reaction to atorvastatin, generalized muscle pain, she discontinued the medication on 7/7/2020. \par \par Patient lives with her daughter. \par \par 7/1/2020 WBC 15.8, Hb 11.8 g/dl, Hct 39.1%, MCV 78.2, , neutrophils 8753, myelocytes 474, basophils 932.\par \par Bone marrow biopsy 7/6/2020:\par hypercellular bone marrow with trilineage hematopoiesis and increase proportion of hyper lobated megakaryocytes, favor essential thrombocytosis. \par Normal FISH for PDGFRA\par Normal female karyotype. 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 \par  [de-identified] : Patient presents here today for a follow up. She is accompanied by her daughter, Tori. Patient is currently taking Anagrelide 0.5 mg daily and tolerating well. \par \par Denies fevers, night sweats, weight loss, bleeding, s/s thrombosis, headache, dizziness, chest pain, visual disturbances. \par \par No other changes in medical, surgical or social history since 10/19/21. \par

## 2022-07-05 NOTE — REVIEW OF SYSTEMS
[Lower Ext Edema] : lower extremity edema [Negative] : Musculoskeletal [Recent Change In Weight] : ~T recent weight change [Chest Pain] : no chest pain [Palpitations] : no palpitations [Leg Claudication] : no intermittent leg claudication [FreeTextEntry5] : lower extremity edema for past year

## 2022-07-05 NOTE — PHYSICAL EXAM
[Restricted in physically strenuous activity but ambulatory and able to carry out work of a light or sedentary nature] : Status 1- Restricted in physically strenuous activity but ambulatory and able to carry out work of a light or sedentary nature, e.g., light house work, office work [Normal] : normal appearance, no rash, nodules, vesicles, ulcers, erythema [de-identified] : Irregular rhythm [de-identified] : denies any pain in b.l lower extremities [de-identified] : + 1 edema to b.l lower extremities

## 2022-07-05 NOTE — ASSESSMENT
[FreeTextEntry1] : 83 yo woman originally from [facility] with PMhx of  HTN, HLD, and essential thrombocytosis- on Hydrea 500mg Mon-Fri since last seen, tolerating well.\par \par Bone marrow biopsy 7/6/2020:\par hypercellular bone marrow with trilineage hematopoiesis and increase proportion of hyper lobated megakaryocytes, favor essential thrombocytosis. \par Normal FISH for PDGFRA\par Normal female karyotype. 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 \par \par 7/5/22\par CBC: WBC 19.59 K/uL,  HGB 12.9 g/dL,  HCT 41.6 %,  PLTS 1102 K/uL. Results reviewed with patient\par CMP, LDH pending\par \par Advised to increase Anagrelide to 0.5 mg twice daily. \par Will recheck CBC in 2 weeks.\par Advised to f.u with Cardiologist MD Chow and PMD (MD Ginger Guerin) as soon as possible. Patient and daughter verbalized understanding\par \par RTC in 4 weeks\par \par Case and management discussed with MD Waller.\par \par \par

## 2022-07-06 ENCOUNTER — OUTPATIENT (OUTPATIENT)
Dept: OUTPATIENT SERVICES | Facility: HOSPITAL | Age: 82
LOS: 1 days | Discharge: HOME | End: 2022-07-06

## 2022-07-06 DIAGNOSIS — D72.89 OTHER SPECIFIED DISORDERS OF WHITE BLOOD CELLS: ICD-10-CM

## 2022-07-19 ENCOUNTER — RESULT REVIEW (OUTPATIENT)
Age: 82
End: 2022-07-19

## 2022-07-19 ENCOUNTER — APPOINTMENT (OUTPATIENT)
Dept: HEMATOLOGY ONCOLOGY | Facility: CLINIC | Age: 82
End: 2022-07-19

## 2022-07-19 LAB
ACANTHOCYTES BLD QL SMEAR: SLIGHT — SIGNIFICANT CHANGE UP
BASOPHILS # BLD AUTO: 0.22 K/UL — HIGH (ref 0–0.2)
BASOPHILS NFR BLD AUTO: 1 % — SIGNIFICANT CHANGE UP (ref 0–2)
ELLIPTOCYTES BLD QL SMEAR: SLIGHT — SIGNIFICANT CHANGE UP
EOSINOPHIL # BLD AUTO: 0.65 K/UL — HIGH (ref 0–0.5)
EOSINOPHIL NFR BLD AUTO: 3 % — SIGNIFICANT CHANGE UP (ref 0–6)
GIANT PLATELETS BLD QL SMEAR: PRESENT — SIGNIFICANT CHANGE UP
HCT VFR BLD CALC: 46.8 % — HIGH (ref 34.5–45)
HGB BLD-MCNC: 14.8 G/DL — SIGNIFICANT CHANGE UP (ref 11.5–15.5)
LG PLATELETS BLD QL AUTO: SLIGHT — SIGNIFICANT CHANGE UP
LYMPHOCYTES # BLD AUTO: 20 % — SIGNIFICANT CHANGE UP (ref 13–44)
LYMPHOCYTES # BLD AUTO: 4.35 K/UL — HIGH (ref 1–3.3)
MCHC RBC-ENTMCNC: 25.5 PG — LOW (ref 27–34)
MCHC RBC-ENTMCNC: 31.6 G/DL — LOW (ref 32–36)
MCV RBC AUTO: 80.7 FL — SIGNIFICANT CHANGE UP (ref 80–100)
METAMYELOCYTES # FLD: 2 % — HIGH (ref 0–0)
MONOCYTES # BLD AUTO: 1.09 K/UL — HIGH (ref 0–0.9)
MONOCYTES NFR BLD AUTO: 5 % — SIGNIFICANT CHANGE UP (ref 2–14)
MYELOCYTES NFR BLD: 2 % — HIGH (ref 0–0)
NEUTROPHILS # BLD AUTO: 14.56 K/UL — HIGH (ref 1.8–7.4)
NEUTROPHILS NFR BLD AUTO: 67 % — SIGNIFICANT CHANGE UP (ref 43–77)
NRBC # BLD: 0 /100 — SIGNIFICANT CHANGE UP (ref 0–0)
NRBC # BLD: SIGNIFICANT CHANGE UP /100 WBCS (ref 0–0)
PLAT MORPH BLD: ABNORMAL
PLATELET # BLD AUTO: 261 K/UL — SIGNIFICANT CHANGE UP (ref 150–400)
POIKILOCYTOSIS BLD QL AUTO: SLIGHT — SIGNIFICANT CHANGE UP
RBC # BLD: 5.8 M/UL — HIGH (ref 3.8–5.2)
RBC # FLD: 18.3 % — HIGH (ref 10.3–14.5)
RBC BLD AUTO: ABNORMAL
WBC # BLD: 21.73 K/UL — HIGH (ref 3.8–10.5)
WBC # FLD AUTO: 21.73 K/UL — HIGH (ref 3.8–10.5)

## 2022-07-21 ENCOUNTER — LABORATORY RESULT (OUTPATIENT)
Age: 82
End: 2022-07-21

## 2022-07-29 ENCOUNTER — TRANSCRIPTION ENCOUNTER (OUTPATIENT)
Age: 82
End: 2022-07-29

## 2022-09-19 ENCOUNTER — OUTPATIENT (OUTPATIENT)
Dept: OUTPATIENT SERVICES | Facility: HOSPITAL | Age: 82
LOS: 1 days | Discharge: HOME | End: 2022-09-19

## 2022-09-19 DIAGNOSIS — D72.89 OTHER SPECIFIED DISORDERS OF WHITE BLOOD CELLS: ICD-10-CM

## 2022-09-22 ENCOUNTER — APPOINTMENT (OUTPATIENT)
Dept: HEMATOLOGY ONCOLOGY | Facility: CLINIC | Age: 82
End: 2022-09-22

## 2022-10-03 ENCOUNTER — RX RENEWAL (OUTPATIENT)
Age: 82
End: 2022-10-03

## 2022-10-10 ENCOUNTER — RESULT REVIEW (OUTPATIENT)
Age: 82
End: 2022-10-10

## 2022-10-10 ENCOUNTER — APPOINTMENT (OUTPATIENT)
Dept: HEMATOLOGY ONCOLOGY | Facility: CLINIC | Age: 82
End: 2022-10-10

## 2022-10-10 VITALS
HEART RATE: 88 BPM | SYSTOLIC BLOOD PRESSURE: 170 MMHG | HEIGHT: 61 IN | BODY MASS INDEX: 27.97 KG/M2 | WEIGHT: 148.15 LBS | DIASTOLIC BLOOD PRESSURE: 83 MMHG | RESPIRATION RATE: 16 BRPM | OXYGEN SATURATION: 100 % | TEMPERATURE: 97 F

## 2022-10-10 LAB
BASOPHILS # BLD AUTO: 0 K/UL — SIGNIFICANT CHANGE UP (ref 0–0.2)
BASOPHILS NFR BLD AUTO: 0 % — SIGNIFICANT CHANGE UP (ref 0–2)
ELLIPTOCYTES BLD QL SMEAR: SLIGHT — SIGNIFICANT CHANGE UP
EOSINOPHIL # BLD AUTO: 1.17 K/UL — HIGH (ref 0–0.5)
EOSINOPHIL NFR BLD AUTO: 8 % — HIGH (ref 0–6)
GIANT PLATELETS BLD QL SMEAR: PRESENT — SIGNIFICANT CHANGE UP
HCT VFR BLD CALC: 43.5 % — SIGNIFICANT CHANGE UP (ref 34.5–45)
HGB BLD-MCNC: 13.3 G/DL — SIGNIFICANT CHANGE UP (ref 11.5–15.5)
LG PLATELETS BLD QL AUTO: SLIGHT — SIGNIFICANT CHANGE UP
LYMPHOCYTES # BLD AUTO: 16 % — SIGNIFICANT CHANGE UP (ref 13–44)
LYMPHOCYTES # BLD AUTO: 2.33 K/UL — SIGNIFICANT CHANGE UP (ref 1–3.3)
MCHC RBC-ENTMCNC: 25.7 PG — LOW (ref 27–34)
MCHC RBC-ENTMCNC: 30.6 G/DL — LOW (ref 32–36)
MCV RBC AUTO: 84 FL — SIGNIFICANT CHANGE UP (ref 80–100)
MONOCYTES # BLD AUTO: 1.02 K/UL — HIGH (ref 0–0.9)
MONOCYTES NFR BLD AUTO: 7 % — SIGNIFICANT CHANGE UP (ref 2–14)
NEUTROPHILS # BLD AUTO: 10.07 K/UL — HIGH (ref 1.8–7.4)
NEUTROPHILS NFR BLD AUTO: 69 % — SIGNIFICANT CHANGE UP (ref 43–77)
NRBC # BLD: 0 /100 — SIGNIFICANT CHANGE UP (ref 0–0)
NRBC # BLD: SIGNIFICANT CHANGE UP /100 WBCS (ref 0–0)
PLAT MORPH BLD: ABNORMAL
PLATELET # BLD AUTO: 334 K/UL — SIGNIFICANT CHANGE UP (ref 150–400)
POIKILOCYTOSIS BLD QL AUTO: SLIGHT — SIGNIFICANT CHANGE UP
RBC # BLD: 5.18 M/UL — SIGNIFICANT CHANGE UP (ref 3.8–5.2)
RBC # FLD: 21.6 % — HIGH (ref 10.3–14.5)
RBC BLD AUTO: ABNORMAL
WBC # BLD: 14.59 K/UL — HIGH (ref 3.8–10.5)
WBC # FLD AUTO: 14.59 K/UL — HIGH (ref 3.8–10.5)

## 2022-10-10 PROCEDURE — 99215 OFFICE O/P EST HI 40 MIN: CPT

## 2022-10-10 RX ORDER — HYDROCHLOROTHIAZIDE 25 MG/1
25 TABLET ORAL
Refills: 0 | Status: DISCONTINUED | COMMUNITY
Start: 2022-04-21 | End: 2022-10-10

## 2022-10-10 NOTE — ASSESSMENT
[FreeTextEntry1] : 83 yo woman originally from [facility] with PMhx of  HTN, HLD, and essential thrombocytosis- on Hydrea 500mg Mon-Fri since last seen, tolerating well.\par \par Bone marrow biopsy 7/6/2020:\par hypercellular bone marrow with trilineage hematopoiesis and increase proportion of hyper lobated megakaryocytes, favor essential thrombocytosis. \par Normal FISH for PDGFRA\par Normal female karyotype. 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 \par \par 7/5/22\par CBC: WBC 19.59 K/uL,  HGB 12.9 g/dL,  HCT 41.6 %,  PLTS 1102 K/uL. Results reviewed with patient\par CMP, LDH pending\par \par Continue  Anagrelide  0.5 mg twice daily. \par \par Advised to f.u with Cardiologist MD hCow and PMD (MD Ginger Guerin) \par \par RTC in 8 weeks\par \par \par \par \par

## 2022-10-10 NOTE — HISTORY OF PRESENT ILLNESS
[0 - No Distress] : Distress Level: 0 [80: Normal activity with effort; some signs or symptoms of disease.] : 80: Normal activity with effort; some signs or symptoms of disease.  [de-identified] : 82 yo woman originally from [facility] with PMhx of  HTN, HLD, referred for thrombocytosis.\par \par Patient has been feeling tired, denies fevers, night sweats, positive for  weight loss 10 pounds /6 months. Denies bleeding or thrombosis. \par \par Patient had a recent adverse reaction to atorvastatin, generalized muscle pain, she discontinued the medication on 7/7/2020. \par \par Patient lives with her daughter. \par \par 7/1/2020 WBC 15.8, Hb 11.8 g/dl, Hct 39.1%, MCV 78.2, , neutrophils 8753, myelocytes 474, basophils 932.\par \par Bone marrow biopsy 7/6/2020:\par hypercellular bone marrow with trilineage hematopoiesis and increase proportion of hyper lobated megakaryocytes, favor essential thrombocytosis. \par Normal FISH for PDGFRA\par Normal female karyotype. 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 \par  [de-identified] : Patient is currently taking Anagrelide 0.5 mg BID \par \par Denies fevers, night sweats, weight loss, bleeding, s/s thrombosis, headache, dizziness, chest pain, visual disturbances. \par \par No other changes in medical, surgical or social history since 7/5/22. \par

## 2022-10-10 NOTE — REVIEW OF SYSTEMS
[Lower Ext Edema] : lower extremity edema [Negative] : Constitutional [Recent Change In Weight] : ~T no recent weight change [Chest Pain] : no chest pain [Palpitations] : no palpitations [Leg Claudication] : no intermittent leg claudication [FreeTextEntry5] : lower extremity edema for past year

## 2022-10-10 NOTE — PHYSICAL EXAM
[Restricted in physically strenuous activity but ambulatory and able to carry out work of a light or sedentary nature] : Status 1- Restricted in physically strenuous activity but ambulatory and able to carry out work of a light or sedentary nature, e.g., light house work, office work [Normal] : affect appropriate [de-identified] : Irregular rhythm [de-identified] : + 1 edema to b.l lower extremities [de-identified] : denies any pain in b.l lower extremities

## 2022-10-13 LAB
ALBUMIN SERPL ELPH-MCNC: 4.3 G/DL
ALP BLD-CCNC: 123 U/L
ALT SERPL-CCNC: 6 U/L
ANION GAP SERPL CALC-SCNC: 12 MMOL/L
AST SERPL-CCNC: 20 U/L
BILIRUB SERPL-MCNC: 0.3 MG/DL
BUN SERPL-MCNC: 41 MG/DL
CALCIUM SERPL-MCNC: 9.5 MG/DL
CHLORIDE SERPL-SCNC: 100 MMOL/L
CO2 SERPL-SCNC: 24 MMOL/L
CREAT SERPL-MCNC: 1.88 MG/DL
EGFR: 26 ML/MIN/1.73M2
GLUCOSE SERPL-MCNC: 103 MG/DL
LDH SERPL-CCNC: 478 U/L
POTASSIUM SERPL-SCNC: 5.2 MMOL/L
PROT SERPL-MCNC: 7.4 G/DL
SODIUM SERPL-SCNC: 136 MMOL/L

## 2022-11-07 ENCOUNTER — APPOINTMENT (OUTPATIENT)
Dept: CARDIOTHORACIC SURGERY | Facility: CLINIC | Age: 82
End: 2022-11-07

## 2022-11-07 VITALS
TEMPERATURE: 97.4 F | WEIGHT: 146 LBS | OXYGEN SATURATION: 100 % | DIASTOLIC BLOOD PRESSURE: 77 MMHG | SYSTOLIC BLOOD PRESSURE: 133 MMHG | HEART RATE: 91 BPM | BODY MASS INDEX: 27.56 KG/M2 | HEIGHT: 61 IN | RESPIRATION RATE: 16 BRPM

## 2022-11-07 DIAGNOSIS — I07.1 RHEUMATIC TRICUSPID INSUFFICIENCY: ICD-10-CM

## 2022-11-07 DIAGNOSIS — E78.5 HYPERLIPIDEMIA, UNSPECIFIED: ICD-10-CM

## 2022-11-07 DIAGNOSIS — I10 ESSENTIAL (PRIMARY) HYPERTENSION: ICD-10-CM

## 2022-11-07 PROCEDURE — 99203 OFFICE O/P NEW LOW 30 MIN: CPT

## 2022-11-07 RX ORDER — METOPROLOL SUCCINATE 50 MG/1
50 TABLET, EXTENDED RELEASE ORAL
Qty: 30 | Refills: 2 | Status: ACTIVE | COMMUNITY
Start: 2022-11-07

## 2022-11-07 RX ORDER — LIFITEGRAST 50 MG/ML
5 SOLUTION/ DROPS OPHTHALMIC
Refills: 0 | Status: ACTIVE | COMMUNITY
Start: 2022-11-07

## 2022-11-07 RX ORDER — LATANOPROST/PF 0.005 %
0.01 DROPS OPHTHALMIC (EYE) DAILY
Qty: 90 | Refills: 3 | Status: ACTIVE | COMMUNITY
Start: 2022-06-29

## 2022-11-07 RX ORDER — SACUBITRIL AND VALSARTAN 24; 26 MG/1; MG/1
24-26 TABLET, FILM COATED ORAL
Qty: 60 | Refills: 0 | Status: ACTIVE | COMMUNITY
Start: 2022-04-21

## 2022-11-07 RX ORDER — DORZOLAMIDE HYDROCHLORIDE 20 MG/ML
2 SOLUTION OPHTHALMIC
Qty: 10 | Refills: 0 | Status: COMPLETED | COMMUNITY
Start: 2022-06-18 | End: 2022-11-07

## 2022-11-07 RX ORDER — APIXABAN 5 MG/1
5 TABLET, FILM COATED ORAL
Qty: 30 | Refills: 2 | Status: ACTIVE | COMMUNITY
Start: 2022-04-21

## 2022-11-07 RX ORDER — FUROSEMIDE 20 MG/1
20 TABLET ORAL
Refills: 0 | Status: ACTIVE | COMMUNITY
Start: 2022-11-07

## 2022-11-07 RX ORDER — SPIRONOLACTONE 25 MG/1
25 TABLET ORAL DAILY
Qty: 7 | Refills: 0 | Status: ACTIVE | COMMUNITY
Start: 2022-11-07

## 2022-11-07 RX ORDER — METOPROLOL TARTRATE 25 MG/1
25 TABLET, FILM COATED ORAL
Refills: 0 | Status: COMPLETED | COMMUNITY
Start: 2022-04-21 | End: 2022-11-07

## 2022-11-07 RX ORDER — BRINZOLAMIDE/BRIMONIDINE TARTRATE 10; 2 MG/ML; MG/ML
1-0.2 SUSPENSION/ DROPS OPHTHALMIC
Refills: 0 | Status: ACTIVE | COMMUNITY
Start: 2022-11-07

## 2022-11-07 RX ORDER — BRIMONIDINE TARTRATE, TIMOLOL MALEATE 2; 5 MG/ML; MG/ML
0.2-0.5 SOLUTION/ DROPS OPHTHALMIC
Qty: 15 | Refills: 0 | Status: COMPLETED | COMMUNITY
Start: 2022-04-14 | End: 2022-11-07

## 2022-11-07 RX ORDER — BRIMONIDINE TARTRATE 2 MG/MG
0.2 SOLUTION/ DROPS OPHTHALMIC
Qty: 15 | Refills: 0 | Status: COMPLETED | COMMUNITY
Start: 2022-06-29 | End: 2022-11-07

## 2022-11-07 NOTE — HISTORY OF PRESENT ILLNESS
[FreeTextEntry1] : Ms. Guerin is an 82 year old female followed and referred by Dr. Deedee Dubois for multi valve disease (mitral regurgitation, AI and tricuspid regurgitation). To review, PMH includes HTN, HLD, Afib (On Eliquis since April 2022 ) and thrombocythemia (followed by Dr. Waller or [facility]) Managed on Anagrelide BID by [facility] (last PLTS 334 10/10/2022).\par \par She was recently seen by her cardiologist and is being worked up for new onset Afib due to multivalve disease, new onset left sided HF.  She underwent TTE with Dr. Deedee Dubois in April of this year that showed thickened MV leaflets with mod-sev MR, normal tricuspid valve with mod TR, Moderate AI and mild AS, LVEF 42% on April TTE and  discussed non surgical approaches for her valve disease.  \par \par She presents today and reports pedal edema for 3 years  .She puts her feet up for leg swelling and it gets resolves by itself . She was started on  diuretics on/off for 3 years on PRN basis and noticed more pedal edema in summer.Now is on standing dose of Lasix  40 mg in am and 20 mg pm with spironolactone 25 mg daily. Her PCP send her to the cardiologist for AFIB and had TTE which revealed Moderate to severe MR. She is independent with ADLs. Denies any chest pain, shortness of breath, palpitations, dizziness or pedal edema. \par \par She is been followed by hematologist for thrombocytopenia. \par \par \par \par Hematologist: Dr.Maria Waller \par

## 2022-11-07 NOTE — ASSESSMENT
[FreeTextEntry1] : Ms. Guerin is an 82 year old female followed and referred by Dr. Deedee Dubois for multi valve disease (mitral regurgitation, AI and tricuspid regurgitation). To review, PMH includes HTN, HLD, Afib (On Eliquis since April 2022 ) and thrombocythemia (followed by Dr. Waller or [facility]) Managed on Anagrelide BID by [facility] (last PLTS 334 10/10/2022).\par \par She was recently seen by her cardiologist and is being worked up for new onset Afib due to multivalve disease, new onset left sided HF.  She underwent TTE with Dr. Deedee Dubois in April of this year that showed thickened MV leaflets with mod-sev MR, normal tricuspid valve with mod TR, Moderate AI and mild AS, LVEF 42% on April TTE and  discussed non surgical approaches for her valve disease.  \par \par She presents today and reports pedal edema for 3 years  .She puts her feet up for leg swelling and it gets resolves by itself . She was started on  diuretics on/off for 3 years on PRN basis and noticed more pedal edema in summer.Now is on standing dose of Lasix  40 mg in am and 20 mg pm with spironolactone 25 mg daily. Her PCP send her to the cardiologist for AFIB and had TTE which revealed Moderate to severe MR. She is independent with ADLs. Denies any chest pain, shortness of breath, palpitations, dizziness or pedal edema. \par \par She is been followed by hematologist for thrombocytopenia. \par \par \par Dr.Frank Starkey  reviewed the cardiac imaging, medical records and reports with patient and discussed the case.\par \par  TTE with Dr. Deedee Dubois in April of this year that showed thickened MV leaflets with mod-sev MR, normal tricuspid valve with mod TR, Moderate AI and mild AS, LVEF 42% .  Dr.Frank Starkey  discussed various approaches in detail ( Open MVR Vs Mitral Clip) .Dr.Frank Starkey will make final recommendations after the RICKI .  All questions and concerns were addressed and patient agrees to proceed with the plan. \par \par \par \par \par Plan:\par 1. RICKI to evaluate valvular heart disease \par 2. Will make final recommendations after the RICKI \par 3. Continue current medication regimen \par 4. May return to clinic on PRN basis \par

## 2022-11-07 NOTE — CONSULT LETTER
[Dear  ___] : Dear  [unfilled], [Courtesy Letter:] : I had the pleasure of seeing your patient, [unfilled], in my office today. [Sincerely,] : Sincerely, [FreeTextEntry2] : Dr. Deedee Dubois [FreeTextEntry3] : Bernard Starkey MD\par Department of Cardiovascular &Thoracic Surgery\par \par Cardiovascular &Thoracic Surgery\par [facility].

## 2022-11-07 NOTE — PHYSICAL EXAM
[General Appearance - Alert] : alert [General Appearance - In No Acute Distress] : in no acute distress [General Appearance - Well Nourished] : well nourished [General Appearance - Well Developed] : well developed [Sclera] : the sclera and conjunctiva were normal [PERRL With Normal Accommodation] : pupils were equal in size, round, and reactive to light [Outer Ear] : the ears and nose were normal in appearance [Hearing Threshold Finger Rub Not Howard] : hearing was normal [Both Tympanic Membranes Were Examined] : both tympanic membranes were normal [Neck Appearance] : the appearance of the neck was normal [] : no respiratory distress [Respiration, Rhythm And Depth] : normal respiratory rhythm and effort [Auscultation Breath Sounds / Voice Sounds] : lungs were clear to auscultation bilaterally [Apical Impulse] : the apical impulse was normal [Heart Rate And Rhythm] : heart rate was normal and rhythm regular [Heart Sounds] : normal S1 and S2 [Examination Of The Chest] : the chest was normal in appearance [2+] : left 2+ [Breast Appearance] : normal in appearance [Bowel Sounds] : normal bowel sounds [Abdomen Soft] : soft [No CVA Tenderness] : no ~M costovertebral angle tenderness [Abnormal Walk] : normal gait [Involuntary Movements] : no involuntary movements were seen [Skin Color & Pigmentation] : normal skin color and pigmentation [Skin Turgor] : normal skin turgor [No Focal Deficits] : no focal deficits [Oriented To Time, Place, And Person] : oriented to person, place, and time [Impaired Insight] : insight and judgment were intact [Affect] : the affect was normal [Mood] : the mood was normal [Memory Recent] : recent memory was not impaired [Memory Remote] : remote memory was not impaired [Systolic grade ___/6] : A grade [unfilled]/6 systolic murmur was heard. [FreeTextEntry1] : Deffered

## 2022-11-07 NOTE — END OF VISIT
[FreeTextEntry3] : I, Dr. Bernard Starkey , personally performed the evaluation and management (E/M) services for this new patient.  That E/M includes conducting the initial examination, assessing all conditions, and establishing the plan of care.  Today, my ACP, [Jake Shetty ], was here to observe my evaluation and management services for this patient to be followed going forward.

## 2022-11-23 ENCOUNTER — NON-APPOINTMENT (OUTPATIENT)
Age: 82
End: 2022-11-23

## 2022-11-27 ENCOUNTER — OUTPATIENT (OUTPATIENT)
Dept: OUTPATIENT SERVICES | Facility: HOSPITAL | Age: 82
LOS: 1 days | End: 2022-11-27
Payer: MEDICARE

## 2022-11-27 DIAGNOSIS — Z11.52 ENCOUNTER FOR SCREENING FOR COVID-19: ICD-10-CM

## 2022-11-27 LAB — SARS-COV-2 RNA SPEC QL NAA+PROBE: SIGNIFICANT CHANGE UP

## 2022-11-27 PROCEDURE — U0003: CPT

## 2022-11-27 PROCEDURE — C9803: CPT

## 2022-11-27 PROCEDURE — U0005: CPT

## 2022-11-30 ENCOUNTER — APPOINTMENT (OUTPATIENT)
Dept: CARDIOTHORACIC SURGERY | Facility: CLINIC | Age: 82
End: 2022-11-30
Payer: MEDICARE

## 2022-11-30 ENCOUNTER — OUTPATIENT (OUTPATIENT)
Dept: OUTPATIENT SERVICES | Facility: HOSPITAL | Age: 82
LOS: 1 days | Discharge: HOME | End: 2022-11-30

## 2022-11-30 DIAGNOSIS — D72.89 OTHER SPECIFIED DISORDERS OF WHITE BLOOD CELLS: ICD-10-CM

## 2022-12-05 ENCOUNTER — APPOINTMENT (OUTPATIENT)
Dept: HEMATOLOGY ONCOLOGY | Facility: CLINIC | Age: 82
End: 2022-12-05

## 2022-12-05 ENCOUNTER — RESULT REVIEW (OUTPATIENT)
Age: 82
End: 2022-12-05

## 2022-12-05 VITALS
DIASTOLIC BLOOD PRESSURE: 83 MMHG | BODY MASS INDEX: 28.26 KG/M2 | RESPIRATION RATE: 16 BRPM | WEIGHT: 149.67 LBS | SYSTOLIC BLOOD PRESSURE: 129 MMHG | HEART RATE: 98 BPM | OXYGEN SATURATION: 99 % | HEIGHT: 61 IN | TEMPERATURE: 97 F

## 2022-12-05 LAB
ANISOCYTOSIS BLD QL: SLIGHT — SIGNIFICANT CHANGE UP
BASOPHILS # BLD AUTO: 0 K/UL — SIGNIFICANT CHANGE UP (ref 0–0.2)
BASOPHILS NFR BLD AUTO: 0 % — SIGNIFICANT CHANGE UP (ref 0–2)
ELLIPTOCYTES BLD QL SMEAR: SLIGHT — SIGNIFICANT CHANGE UP
EOSINOPHIL # BLD AUTO: 1.46 K/UL — HIGH (ref 0–0.5)
EOSINOPHIL NFR BLD AUTO: 9 % — HIGH (ref 0–6)
GIANT PLATELETS BLD QL SMEAR: PRESENT — SIGNIFICANT CHANGE UP
HCT VFR BLD CALC: 36 % — SIGNIFICANT CHANGE UP (ref 34.5–45)
HGB BLD-MCNC: 11.1 G/DL — LOW (ref 11.5–15.5)
LG PLATELETS BLD QL AUTO: SLIGHT — SIGNIFICANT CHANGE UP
LYMPHOCYTES # BLD AUTO: 19 % — SIGNIFICANT CHANGE UP (ref 13–44)
LYMPHOCYTES # BLD AUTO: 3.08 K/UL — SIGNIFICANT CHANGE UP (ref 1–3.3)
MCHC RBC-ENTMCNC: 26.6 PG — LOW (ref 27–34)
MCHC RBC-ENTMCNC: 30.8 G/DL — LOW (ref 32–36)
MCV RBC AUTO: 86.1 FL — SIGNIFICANT CHANGE UP (ref 80–100)
MONOCYTES # BLD AUTO: 0.81 K/UL — SIGNIFICANT CHANGE UP (ref 0–0.9)
MONOCYTES NFR BLD AUTO: 5 % — SIGNIFICANT CHANGE UP (ref 2–14)
NEUTROPHILS # BLD AUTO: 10.85 K/UL — HIGH (ref 1.8–7.4)
NEUTROPHILS NFR BLD AUTO: 67 % — SIGNIFICANT CHANGE UP (ref 43–77)
NRBC # BLD: 1 /100 — HIGH (ref 0–0)
NRBC # BLD: SIGNIFICANT CHANGE UP /100 WBCS (ref 0–0)
PLAT MORPH BLD: ABNORMAL
PLATELET # BLD AUTO: 396 K/UL — SIGNIFICANT CHANGE UP (ref 150–400)
POIKILOCYTOSIS BLD QL AUTO: SLIGHT — SIGNIFICANT CHANGE UP
RBC # BLD: 4.18 M/UL — SIGNIFICANT CHANGE UP (ref 3.8–5.2)
RBC # FLD: 21.3 % — HIGH (ref 10.3–14.5)
RBC BLD AUTO: ABNORMAL
SCHISTOCYTES BLD QL AUTO: SLIGHT — SIGNIFICANT CHANGE UP
WBC # BLD: 16.2 K/UL — HIGH (ref 3.8–10.5)
WBC # FLD AUTO: 16.2 K/UL — HIGH (ref 3.8–10.5)

## 2022-12-05 PROCEDURE — 99215 OFFICE O/P EST HI 40 MIN: CPT

## 2022-12-05 RX ORDER — NETARSUDIL 0.2 MG/ML
0.02 SOLUTION/ DROPS OPHTHALMIC; TOPICAL
Qty: 8 | Refills: 0 | Status: ACTIVE | COMMUNITY
Start: 2022-10-18

## 2022-12-05 RX ORDER — NEOMYCIN AND POLYMYXIN B SULFATES AND DEXAMETHASONE 3.5; 10000; 1 MG/G; [IU]/G; MG/G
3.5-10000-0.1 OINTMENT OPHTHALMIC
Qty: 4 | Refills: 0 | Status: ACTIVE | COMMUNITY
Start: 2022-11-24

## 2022-12-05 NOTE — REVIEW OF SYSTEMS
[Lower Ext Edema] : lower extremity edema [Negative] : Allergic/Immunologic [Red Eyes] : red eyes [Recent Change In Weight] : ~T no recent weight change [Chest Pain] : no chest pain [Palpitations] : no palpitations [Leg Claudication] : no intermittent leg claudication [FreeTextEntry5] : lower extremity edema for past year

## 2022-12-05 NOTE — ASSESSMENT
[FreeTextEntry1] : 83 yo woman originally from [facility] with PMhx of  HTN, HLD, and essential thrombocytosis- on Hydrea 500mg Mon-Fri since last seen, tolerating well.\par \par Bone marrow biopsy 7/6/2020:\par hypercellular bone marrow with trilineage hematopoiesis and increase proportion of hyper lobated megakaryocytes, favor essential thrombocytosis. \par Normal FISH for PDGFRA\par Normal female karyotype. 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 \par \par 7/5/22\par CBC: WBC 16.3K/uL,  HGB 11. g/dL,  PLTS 338 K/uL. Results reviewed with patient\par CMP, LDH pending\par \par Continue  Anagrelide  0.5 mg twice daily. \par \par Advised to f.u with Cardiologist MD Chow and PMD (MD Ginger Guerin) \par \par RTC in 12 weeks\par \par \par \par \par

## 2022-12-05 NOTE — HISTORY OF PRESENT ILLNESS
[0 - No Distress] : Distress Level: 0 [80: Normal activity with effort; some signs or symptoms of disease.] : 80: Normal activity with effort; some signs or symptoms of disease.  [de-identified] : 82 yo woman originally from [facility] with PMhx of  HTN, HLD, referred for thrombocytosis.\par \par Patient has been feeling tired, denies fevers, night sweats, positive for  weight loss 10 pounds /6 months. Denies bleeding or thrombosis. \par \par Patient had a recent adverse reaction to atorvastatin, generalized muscle pain, she discontinued the medication on 7/7/2020. \par \par Patient lives with her daughter. \par \par 7/1/2020 WBC 15.8, Hb 11.8 g/dl, Hct 39.1%, MCV 78.2, , neutrophils 8753, myelocytes 474, basophils 932.\par \par Bone marrow biopsy 7/6/2020:\par hypercellular bone marrow with trilineage hematopoiesis and increase proportion of hyper lobated megakaryocytes, favor essential thrombocytosis. \par Normal FISH for PDGFRA\par Normal female karyotype. 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 \par  [de-identified] : Patient is currently taking Anagrelide 0.5 mg BID \par \par Denies fevers, night sweats, weight loss, bleeding, s/s thrombosis, headache, dizziness, chest pain, visual disturbances. \par \par No other changes in medical, surgical or social history since 10/10/22. \par

## 2022-12-05 NOTE — PHYSICAL EXAM
[Ambulatory and capable of all self care but unable to carry out any work activities] : Status 2- Ambulatory and capable of all self care but unable to carry out any work activities. Up and about more than 50% of waking hours [Normal] : no peripheral adenopathy appreciated [de-identified] : Irregular rhythm [de-identified] : + 1 edema to b.l lower extremities [de-identified] : denies any pain in b.l lower extremities

## 2022-12-06 LAB
ALBUMIN SERPL ELPH-MCNC: 4.3 G/DL
ALP BLD-CCNC: 154 U/L
ALT SERPL-CCNC: 5 U/L
ANION GAP SERPL CALC-SCNC: 12 MMOL/L
AST SERPL-CCNC: 16 U/L
BILIRUB SERPL-MCNC: 0.3 MG/DL
BUN SERPL-MCNC: 50 MG/DL
CALCIUM SERPL-MCNC: 9.4 MG/DL
CHLORIDE SERPL-SCNC: 107 MMOL/L
CO2 SERPL-SCNC: 20 MMOL/L
CREAT SERPL-MCNC: 2.2 MG/DL
EGFR: 22 ML/MIN/1.73M2
GLUCOSE SERPL-MCNC: 88 MG/DL
LDH SERPL-CCNC: 467 U/L
POTASSIUM SERPL-SCNC: 5.2 MMOL/L
PROT SERPL-MCNC: 7.2 G/DL
SODIUM SERPL-SCNC: 138 MMOL/L

## 2022-12-09 ENCOUNTER — NON-APPOINTMENT (OUTPATIENT)
Age: 82
End: 2022-12-09

## 2022-12-12 ENCOUNTER — OUTPATIENT (OUTPATIENT)
Dept: OUTPATIENT SERVICES | Facility: HOSPITAL | Age: 82
LOS: 1 days | End: 2022-12-12
Payer: MEDICARE

## 2022-12-12 DIAGNOSIS — Z11.52 ENCOUNTER FOR SCREENING FOR COVID-19: ICD-10-CM

## 2022-12-12 LAB — SARS-COV-2 RNA SPEC QL NAA+PROBE: DETECTED

## 2022-12-12 PROCEDURE — U0005: CPT

## 2022-12-12 PROCEDURE — U0003: CPT

## 2022-12-12 PROCEDURE — C9803: CPT

## 2022-12-15 ENCOUNTER — APPOINTMENT (OUTPATIENT)
Dept: CV DIAGNOSITCS | Facility: HOSPITAL | Age: 82
End: 2022-12-15

## 2022-12-21 ENCOUNTER — APPOINTMENT (OUTPATIENT)
Dept: CARDIOTHORACIC SURGERY | Facility: CLINIC | Age: 82
End: 2022-12-21
Payer: MEDICARE

## 2022-12-21 ENCOUNTER — NON-APPOINTMENT (OUTPATIENT)
Age: 82
End: 2022-12-21

## 2022-12-28 ENCOUNTER — OUTPATIENT (OUTPATIENT)
Dept: OUTPATIENT SERVICES | Facility: HOSPITAL | Age: 82
LOS: 1 days | End: 2022-12-28
Payer: MEDICARE

## 2022-12-28 ENCOUNTER — APPOINTMENT (OUTPATIENT)
Dept: CV DIAGNOSITCS | Facility: HOSPITAL | Age: 82
End: 2022-12-28

## 2022-12-28 VITALS
OXYGEN SATURATION: 99 % | HEART RATE: 112 BPM | RESPIRATION RATE: 20 BRPM | SYSTOLIC BLOOD PRESSURE: 111 MMHG | DIASTOLIC BLOOD PRESSURE: 78 MMHG

## 2022-12-28 VITALS
RESPIRATION RATE: 16 BRPM | OXYGEN SATURATION: 105 % | HEART RATE: 105 BPM | TEMPERATURE: 98 F | DIASTOLIC BLOOD PRESSURE: 56 MMHG | SYSTOLIC BLOOD PRESSURE: 101 MMHG

## 2022-12-28 DIAGNOSIS — I34.0 NONRHEUMATIC MITRAL (VALVE) INSUFFICIENCY: ICD-10-CM

## 2022-12-28 PROCEDURE — 93312 ECHO TRANSESOPHAGEAL: CPT

## 2022-12-28 PROCEDURE — 93306 TTE W/DOPPLER COMPLETE: CPT | Mod: 26

## 2022-12-28 PROCEDURE — 93306 TTE W/DOPPLER COMPLETE: CPT

## 2022-12-28 PROCEDURE — 93312 ECHO TRANSESOPHAGEAL: CPT | Mod: 26

## 2022-12-28 NOTE — PRE-ANESTHESIA EVALUATION ADULT - NSANTHOSAYNRD_GEN_A_CORE
No. MELANIA screening performed.  STOP BANG Legend: 0-2 = LOW Risk; 3-4 = INTERMEDIATE Risk; 5-8 = HIGH Risk

## 2022-12-28 NOTE — PRE-ANESTHESIA EVALUATION ADULT - NSANTHPMHFT_GEN_ALL_CORE
Mitral regurgitation/Tricuspid regurgitation/Aortic insufficiency  Heart failure  EF 42% per chart  Afib

## 2022-12-30 ENCOUNTER — APPOINTMENT (OUTPATIENT)
Dept: CARDIOTHORACIC SURGERY | Facility: CLINIC | Age: 82
End: 2022-12-30
Payer: MEDICARE

## 2022-12-30 VITALS
HEART RATE: 95 BPM | SYSTOLIC BLOOD PRESSURE: 230 MMHG | BODY MASS INDEX: 27.56 KG/M2 | WEIGHT: 146 LBS | TEMPERATURE: 98 F | RESPIRATION RATE: 16 BRPM | OXYGEN SATURATION: 100 % | DIASTOLIC BLOOD PRESSURE: 69 MMHG | HEIGHT: 61 IN

## 2022-12-30 DIAGNOSIS — I35.1 NONRHEUMATIC AORTIC (VALVE) INSUFFICIENCY: ICD-10-CM

## 2022-12-30 DIAGNOSIS — I34.0 NONRHEUMATIC MITRAL (VALVE) INSUFFICIENCY: ICD-10-CM

## 2022-12-30 DIAGNOSIS — D75.839 THROMBOCYTOSIS, UNSPECIFIED: ICD-10-CM

## 2022-12-30 DIAGNOSIS — I48.91 UNSPECIFIED ATRIAL FIBRILLATION: ICD-10-CM

## 2022-12-30 PROCEDURE — 99214 OFFICE O/P EST MOD 30 MIN: CPT

## 2022-12-30 NOTE — END OF VISIT
[FreeTextEntry3] : I, Dr. Starkey, personally performed the evaluation and management (E/M) services for this established patient who presents today with (a) new problem(s)/exacerbation of (an) existing condition(s).  That E/M includes conducting the examination, assessing all new/exacerbated conditions, and establishing a new plan of care.  Today, the ACP, Dmitry Hewitt, was here to observe my evaluation and management services for this patient.

## 2022-12-30 NOTE — HISTORY OF PRESENT ILLNESS
[FreeTextEntry1] : Ms. Guerin is an 82 year old female who presents today for follow up from her initial evaluation on 11/7/2022 for multivalvular disease.  She is followed and referred by Dr. Deedee Dubois for multi valve disease (mitral regurgitation, AI and tricuspid regurgitation). To review, PMH includes HTN, HLD, Afib (On Eliquis since April 2022 ) and thrombocythemia (followed by Dr. Waller or [facility]) Managed on Anagrelide BID by [facility] (last PLTS 334 10/10/2022).\par \par She was recently seen by her cardiologist and is being worked up for new onset Afib, new onset left sided HF. She underwent TTE with Dr. Deedee Dubois in April of this year that showed thickened MV leaflets with mod-sev MR, normal tricuspid valve with mod TR, Moderate AI and mild AS, LVEF 42% on April TTE and discussed non surgical approaches for her valve disease. When seen initially in our office she endorsed pedal edema for 3 years, managed on and off diuretics PRN and noticed she was using them more frequently over the summer. \par She is been followed by hematologist Dr.Maria Waller for thrombocytopenia. \par \par At her initial visit, Dr. Starkey discussed open MVR Vs Mitral Clip pending RICKI that is scheduled for 12/28 with Dr. Singh. \par \link Presents today with her daughter in law.  Reports SOB at times that is the same since last visit. Reports swelling to BL LE that is better but still there, recently started and maintained on lasix 60mg QD and aldactone which she reports helped a lot.  She reports she is still getting around well.  Walks up and down steps at home to do laundry and is independent with her ADL's. No chest pain, dizziness, weight gain (stable at 146 lbs), able to lay flat and denies orthopnea. Was COVID + 12/12 but was asymptomatic.

## 2022-12-30 NOTE — REVIEW OF SYSTEMS
[Palpitations] : palpitations [Lower Ext Edema] : lower extremity edema [As Noted in HPI] : as noted in HPI [Shortness Of Breath] : shortness of breath [SOB on Exertion] : shortness of breath during exertion [Negative] : Heme/Lymph [Fever] : no fever [Chills] : no chills [Chest Pain] : no chest pain

## 2022-12-30 NOTE — CONSULT LETTER
[Dear  ___] : Dear  [unfilled], [Courtesy Letter:] : I had the pleasure of seeing your patient, [unfilled], in my office today. [Please see my note below.] : Please see my note below. [Sincerely,] : Sincerely, [FreeTextEntry2] : DR. RHIANNON FLORES\par [phone]\par [place] [address]\par [phone]\par [phone] [FreeTextEntry3] : Bernard Starkey MD\par Department of Cardiovascular &Thoracic Surgery\par \par Cardiovascular &Thoracic Surgery\par [facility].

## 2022-12-30 NOTE — ASSESSMENT
[FreeTextEntry1] : I reviewed the cardiac imaging, medical records and reports with patient and discussed the case.  I discussed the risks, benefits and alternatives to surgical MVR vs consultation for possible MEGAN with structural heart team. \par \par I discussed pros, cons and risks associated with both (if pt would be candidate for MEGAN). Risks included but not limited to  bleeding, stroke, Myocardial Infarction, kidney problems, blood transfusion, permanent  pacemaker implantation, infections and death.  I also discussed the various approaches in detail. Given pt is mainly asymptomatic pt wishes to discuss with family first prior to making a decision. \par \par \par Plan:\par \par - Discussed with structural heart team\par - Recommend better control of Afib with cardiologist to manage symptoms at this time\par - Follow up with Dr. Deedee Dubois within the week\par - Call with any questions or concerns\par - Call with any worsening of symptoms

## 2022-12-30 NOTE — PHYSICAL EXAM
[Sclera] : the sclera and conjunctiva were normal [Apical Impulse] : the apical impulse was normal [Heart Sounds] : normal S1 and S2 [Bowel Sounds] : normal bowel sounds [Abdomen Soft] : soft [Abdomen Tenderness] : non-tender [Involuntary Movements] : no involuntary movements were seen [No Focal Deficits] : no focal deficits [Oriented To Time, Place, And Person] : oriented to person, place, and time [Impaired Insight] : insight and judgment were intact [Affect] : the affect was normal [Mood] : the mood was normal [Neck Appearance] : the appearance of the neck was normal [Jugular Venous Distention Increased] : there was no jugular-venous distention [] : no respiratory distress [Respiration, Rhythm And Depth] : normal respiratory rhythm and effort [Exaggerated Use Of Accessory Muscles For Inspiration] : no accessory muscle use [FreeTextEntry1] : 2/6 apex systolic murmur, irregular HR

## 2023-02-27 ENCOUNTER — OUTPATIENT (OUTPATIENT)
Dept: OUTPATIENT SERVICES | Facility: HOSPITAL | Age: 83
LOS: 1 days | Discharge: HOME | End: 2023-02-27

## 2023-02-27 DIAGNOSIS — D72.89 OTHER SPECIFIED DISORDERS OF WHITE BLOOD CELLS: ICD-10-CM

## 2023-03-06 ENCOUNTER — APPOINTMENT (OUTPATIENT)
Dept: HEMATOLOGY ONCOLOGY | Facility: CLINIC | Age: 83
End: 2023-03-06
Payer: MEDICARE

## 2023-03-06 ENCOUNTER — RESULT REVIEW (OUTPATIENT)
Age: 83
End: 2023-03-06

## 2023-03-06 VITALS
RESPIRATION RATE: 16 BRPM | DIASTOLIC BLOOD PRESSURE: 93 MMHG | TEMPERATURE: 97.1 F | OXYGEN SATURATION: 100 % | BODY MASS INDEX: 27.51 KG/M2 | HEIGHT: 61 IN | SYSTOLIC BLOOD PRESSURE: 153 MMHG | HEART RATE: 106 BPM | WEIGHT: 145.7 LBS

## 2023-03-06 LAB
ANISOCYTOSIS BLD QL: SLIGHT — SIGNIFICANT CHANGE UP
BASOPHILS # BLD AUTO: 0.35 K/UL — HIGH (ref 0–0.2)
BASOPHILS NFR BLD AUTO: 2.2 % — HIGH (ref 0–2)
ELLIPTOCYTES BLD QL SMEAR: SLIGHT — SIGNIFICANT CHANGE UP
EOSINOPHIL # BLD AUTO: 1.31 K/UL — HIGH (ref 0–0.5)
EOSINOPHIL NFR BLD AUTO: 8.3 % — HIGH (ref 0–6)
GIANT PLATELETS BLD QL SMEAR: PRESENT — SIGNIFICANT CHANGE UP
HCT VFR BLD CALC: 32.2 % — LOW (ref 34.5–45)
HGB BLD-MCNC: 10 G/DL — LOW (ref 11.5–15.5)
IMM GRANULOCYTES NFR BLD AUTO: 1.4 % — HIGH (ref 0–0.9)
LG PLATELETS BLD QL AUTO: SLIGHT — SIGNIFICANT CHANGE UP
LYMPHOCYTES # BLD AUTO: 14.6 % — SIGNIFICANT CHANGE UP (ref 13–44)
LYMPHOCYTES # BLD AUTO: 2.31 K/UL — SIGNIFICANT CHANGE UP (ref 1–3.3)
MCHC RBC-ENTMCNC: 26.6 PG — LOW (ref 27–34)
MCHC RBC-ENTMCNC: 31.1 G/DL — LOW (ref 32–36)
MCV RBC AUTO: 85.6 FL — SIGNIFICANT CHANGE UP (ref 80–100)
MONOCYTES # BLD AUTO: 0.82 K/UL — SIGNIFICANT CHANGE UP (ref 0–0.9)
MONOCYTES NFR BLD AUTO: 5.2 % — SIGNIFICANT CHANGE UP (ref 2–14)
NEUTROPHILS # BLD AUTO: 10.84 K/UL — HIGH (ref 1.8–7.4)
NEUTROPHILS NFR BLD AUTO: 68.3 % — SIGNIFICANT CHANGE UP (ref 43–77)
NRBC # BLD: 0 /100 WBCS — SIGNIFICANT CHANGE UP (ref 0–0)
PLAT MORPH BLD: ABNORMAL
PLATELET # BLD AUTO: 126 K/UL — LOW (ref 150–400)
POIKILOCYTOSIS BLD QL AUTO: SLIGHT — SIGNIFICANT CHANGE UP
RBC # BLD: 3.76 M/UL — LOW (ref 3.8–5.2)
RBC # FLD: 19 % — HIGH (ref 10.3–14.5)
RBC BLD AUTO: ABNORMAL
SCHISTOCYTES BLD QL AUTO: SLIGHT — SIGNIFICANT CHANGE UP
WBC # BLD: 15.85 K/UL — HIGH (ref 3.8–10.5)
WBC # FLD AUTO: 15.85 K/UL — HIGH (ref 3.8–10.5)

## 2023-03-06 PROCEDURE — 99215 OFFICE O/P EST HI 40 MIN: CPT

## 2023-03-06 RX ORDER — GABAPENTIN 400 MG/1
400 CAPSULE ORAL
Qty: 30 | Refills: 0 | Status: COMPLETED | COMMUNITY
Start: 2023-02-13

## 2023-03-06 RX ORDER — TRAMADOL HYDROCHLORIDE 50 MG/1
50 TABLET, COATED ORAL
Qty: 15 | Refills: 0 | Status: COMPLETED | COMMUNITY
Start: 2023-02-13

## 2023-03-06 RX ORDER — ACETAMINOPHEN 650 MG/1
650 TABLET, EXTENDED RELEASE ORAL
Qty: 30 | Refills: 0 | Status: COMPLETED | COMMUNITY
Start: 2023-02-13

## 2023-03-06 RX ORDER — BIMATOPROST 0.1 MG/ML
0.01 SOLUTION/ DROPS OPHTHALMIC
Qty: 8 | Refills: 0 | Status: COMPLETED | COMMUNITY
Start: 2023-01-17

## 2023-03-06 RX ORDER — METHOCARBAMOL 750 MG/1
750 TABLET, FILM COATED ORAL
Qty: 30 | Refills: 0 | Status: COMPLETED | COMMUNITY
Start: 2023-02-13

## 2023-03-06 NOTE — HISTORY OF PRESENT ILLNESS
[0 - No Distress] : Distress Level: 0 [80: Normal activity with effort; some signs or symptoms of disease.] : 80: Normal activity with effort; some signs or symptoms of disease.  [de-identified] : 80 yo woman originally from [facility] with PMhx of  HTN, HLD, referred for thrombocytosis.\par \par Patient has been feeling tired, denies fevers, night sweats, positive for  weight loss 10 pounds /6 months. Denies bleeding or thrombosis. \par \par Patient had a recent adverse reaction to atorvastatin, generalized muscle pain, she discontinued the medication on 7/7/2020. \par \par Patient lives with her daughter. \par \par 7/1/2020 WBC 15.8, Hb 11.8 g/dl, Hct 39.1%, MCV 78.2, , neutrophils 8753, myelocytes 474, basophils 932.\par \par Bone marrow biopsy 7/6/2020:\par hypercellular bone marrow with trilineage hematopoiesis and increase proportion of hyper lobated megakaryocytes, favor essential thrombocytosis. \par Normal FISH for PDGFRA\par Normal female karyotype. 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 \par  [de-identified] : Patient is currently taking Anagrelide 0.5 mg BID \par \par Denies fevers, night sweats, weight loss, bleeding, s/s thrombosis, headache, dizziness, chest pain, visual disturbances. Patient has a new ecchymosis of the left breast. \par \par No other changes in medical, surgical or social history since 12/5/22. \par

## 2023-03-06 NOTE — PHYSICAL EXAM
[Ambulatory and capable of all self care but unable to carry out any work activities] : Status 2- Ambulatory and capable of all self care but unable to carry out any work activities. Up and about more than 50% of waking hours [Normal] : affect appropriate [de-identified] : ecchymosis of the left breast.  [de-identified] : Irregular rhythm [de-identified] : + 1 edema LLE

## 2023-03-06 NOTE — REVIEW OF SYSTEMS
[Lower Ext Edema] : lower extremity edema [Negative] : Eyes [Recent Change In Weight] : ~T no recent weight change [Red Eyes] : eyes not red [Chest Pain] : no chest pain [Palpitations] : no palpitations [Leg Claudication] : no intermittent leg claudication [FreeTextEntry5] : lower extremity edema for past year . Ecchymosis of the left breast.

## 2023-03-06 NOTE — ASSESSMENT
[FreeTextEntry1] : 81 yo woman originally from [facility] with PMhx of  HTN, HLD, and essential thrombocytosis- on Hydrea 500mg Mon-Fri since last seen, tolerating well.\par \par Bone marrow biopsy 7/6/2020:\par hypercellular bone marrow with trilineage hematopoiesis and increase proportion of hyper lobated megakaryocytes, favor essential thrombocytosis. \par Normal FISH for PDGFRA\par Normal female karyotype. 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 \par \par  Patient has a new ecchymosis of the left breast. \par Patient is also on Eliquis for A fib, by cardiology \par \par DC  Anagrelide  0.5 mg twice daily. \par \par Advised to f.u with Cardiologist MD Chow and PMD (MD Ginger Guerin) \par \par RTC  3/27/23 to recheck counts. \par \par \par \par \par

## 2023-03-07 LAB
ALBUMIN SERPL ELPH-MCNC: 4.3 G/DL
ALP BLD-CCNC: 124 U/L
ALT SERPL-CCNC: 7 U/L
ANION GAP SERPL CALC-SCNC: 12 MMOL/L
AST SERPL-CCNC: 19 U/L
BILIRUB SERPL-MCNC: 0.5 MG/DL
BUN SERPL-MCNC: 36 MG/DL
CALCIUM SERPL-MCNC: 9.5 MG/DL
CHLORIDE SERPL-SCNC: 102 MMOL/L
CO2 SERPL-SCNC: 20 MMOL/L
CREAT SERPL-MCNC: 2.11 MG/DL
EGFR: 23 ML/MIN/1.73M2
GLUCOSE SERPL-MCNC: 114 MG/DL
LDH SERPL-CCNC: 480 U/L
POTASSIUM SERPL-SCNC: 6.1 MMOL/L
PROT SERPL-MCNC: 7.5 G/DL
SODIUM SERPL-SCNC: 134 MMOL/L

## 2023-04-04 ENCOUNTER — APPOINTMENT (OUTPATIENT)
Dept: HEMATOLOGY ONCOLOGY | Facility: CLINIC | Age: 83
End: 2023-04-04
Payer: MEDICARE

## 2023-04-04 ENCOUNTER — NON-APPOINTMENT (OUTPATIENT)
Age: 83
End: 2023-04-04

## 2023-04-04 ENCOUNTER — RESULT REVIEW (OUTPATIENT)
Age: 83
End: 2023-04-04

## 2023-04-04 VITALS
DIASTOLIC BLOOD PRESSURE: 87 MMHG | OXYGEN SATURATION: 100 % | BODY MASS INDEX: 27.34 KG/M2 | HEIGHT: 61.02 IN | HEART RATE: 99 BPM | RESPIRATION RATE: 16 BRPM | SYSTOLIC BLOOD PRESSURE: 153 MMHG | TEMPERATURE: 97.3 F | WEIGHT: 144.82 LBS

## 2023-04-04 LAB
BASOPHILS # BLD AUTO: 0.34 K/UL — HIGH (ref 0–0.2)
BASOPHILS NFR BLD AUTO: 2.2 % — HIGH (ref 0–2)
EOSINOPHIL # BLD AUTO: 1.12 K/UL — HIGH (ref 0–0.5)
EOSINOPHIL NFR BLD AUTO: 7.2 % — HIGH (ref 0–6)
HCT VFR BLD CALC: 39.1 % — SIGNIFICANT CHANGE UP (ref 34.5–45)
HGB BLD-MCNC: 11.7 G/DL — SIGNIFICANT CHANGE UP (ref 11.5–15.5)
IMM GRANULOCYTES NFR BLD AUTO: 1 % — HIGH (ref 0–0.9)
LYMPHOCYTES # BLD AUTO: 16.4 % — SIGNIFICANT CHANGE UP (ref 13–44)
LYMPHOCYTES # BLD AUTO: 2.56 K/UL — SIGNIFICANT CHANGE UP (ref 1–3.3)
MCHC RBC-ENTMCNC: 25.5 PG — LOW (ref 27–34)
MCHC RBC-ENTMCNC: 29.9 G/DL — LOW (ref 32–36)
MCV RBC AUTO: 85.2 FL — SIGNIFICANT CHANGE UP (ref 80–100)
MONOCYTES # BLD AUTO: 1.13 K/UL — HIGH (ref 0–0.9)
MONOCYTES NFR BLD AUTO: 7.2 % — SIGNIFICANT CHANGE UP (ref 2–14)
NEUTROPHILS # BLD AUTO: 10.35 K/UL — HIGH (ref 1.8–7.4)
NEUTROPHILS NFR BLD AUTO: 66 % — SIGNIFICANT CHANGE UP (ref 43–77)
NRBC # BLD: 0 /100 WBCS — SIGNIFICANT CHANGE UP (ref 0–0)
PLATELET # BLD AUTO: 1207 K/UL — CRITICAL HIGH (ref 150–400)
RBC # BLD: 4.59 M/UL — SIGNIFICANT CHANGE UP (ref 3.8–5.2)
RBC # FLD: 20.3 % — HIGH (ref 10.3–14.5)
WBC # BLD: 15.65 K/UL — HIGH (ref 3.8–10.5)
WBC # FLD AUTO: 15.65 K/UL — HIGH (ref 3.8–10.5)

## 2023-04-04 PROCEDURE — 99214 OFFICE O/P EST MOD 30 MIN: CPT

## 2023-04-04 RX ORDER — ANAGRELIDE 0.5 MG/1
0.5 CAPSULE ORAL
Qty: 60 | Refills: 6 | Status: DISCONTINUED | COMMUNITY
Start: 2022-04-21 | End: 2023-04-04

## 2023-04-04 NOTE — REVIEW OF SYSTEMS
[Lower Ext Edema] : lower extremity edema [Negative] : Allergic/Immunologic [Recent Change In Weight] : ~T no recent weight change [Red Eyes] : eyes not red [Chest Pain] : no chest pain [Palpitations] : no palpitations [Leg Claudication] : no intermittent leg claudication [FreeTextEntry5] : lower extremity edema for past year

## 2023-04-04 NOTE — PHYSICAL EXAM
[Ambulatory and capable of all self care but unable to carry out any work activities] : Status 2- Ambulatory and capable of all self care but unable to carry out any work activities. Up and about more than 50% of waking hours [Normal] : clear to auscultation bilaterally, no dullness, no wheezing [de-identified] : Irregular rhythm [de-identified] : + 1 edema LLE

## 2023-04-04 NOTE — ASSESSMENT
[FreeTextEntry1] : 84 yo woman originally from [facility] with PMhx of  HTN, HLD, and essential thrombocytosis-\par \par Bone marrow biopsy 7/6/2020:\par hypercellular bone marrow with trilineage hematopoiesis and increase proportion of hyper lobated megakaryocytes, favor essential thrombocytosis. \par Normal FISH for PDGFRA\par Normal female karyotype. 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 \par \par 4/3/23-  PLTS 1207 K. Will restart Anagrelide 0.5 mg daily\par Recheck in \par Patient is also on Eliquis for A fib, by cardiology \par Advised to f.u with Cardiologist MD Chow and PMD (MD Ginger Guerin) \par \par RTC  4/21/23 to recheck counts. \par \par \par \par \par

## 2023-04-04 NOTE — HISTORY OF PRESENT ILLNESS
[0 - No Distress] : Distress Level: 0 [80: Normal activity with effort; some signs or symptoms of disease.] : 80: Normal activity with effort; some signs or symptoms of disease.  [de-identified] : 80 yo woman originally from [facility] with PMhx of  HTN, HLD, referred for thrombocytosis.\par \par Patient has been feeling tired, denies fevers, night sweats, positive for  weight loss 10 pounds /6 months. Denies bleeding or thrombosis. \par \par Patient had a recent adverse reaction to atorvastatin, generalized muscle pain, she discontinued the medication on 7/7/2020. \par \par Patient lives with her daughter. \par \par 7/1/2020 WBC 15.8, Hb 11.8 g/dl, Hct 39.1%, MCV 78.2, , neutrophils 8753, myelocytes 474, basophils 932.\par \par Bone marrow biopsy 7/6/2020:\par hypercellular bone marrow with trilineage hematopoiesis and increase proportion of hyper lobated megakaryocytes, favor essential thrombocytosis. \par Normal FISH for PDGFRA\par Normal female karyotype. 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 \par  [de-identified] : Patient is off therapy, PLts 1207\par \par Denies fevers, night sweats, weight loss, bleeding, s/s thrombosis, headache, dizziness, chest pain, visual disturbances. Patient has a new ecchymosis of the left breast. \par \par No other changes in medical, surgical or social history since 3/6/23. \par

## 2023-04-05 ENCOUNTER — NON-APPOINTMENT (OUTPATIENT)
Age: 83
End: 2023-04-05

## 2023-04-05 DIAGNOSIS — E87.5 HYPERKALEMIA: ICD-10-CM

## 2023-04-05 LAB
ALBUMIN SERPL ELPH-MCNC: 4.6 G/DL
ALP BLD-CCNC: 152 U/L
ALT SERPL-CCNC: 8 U/L
ANION GAP SERPL CALC-SCNC: 13 MMOL/L
AST SERPL-CCNC: 20 U/L
BILIRUB SERPL-MCNC: 0.3 MG/DL
BUN SERPL-MCNC: 26 MG/DL
CALCIUM SERPL-MCNC: 9.4 MG/DL
CHLORIDE SERPL-SCNC: 93 MMOL/L
CO2 SERPL-SCNC: 24 MMOL/L
CREAT SERPL-MCNC: 1.78 MG/DL
EGFR: 28 ML/MIN/1.73M2
GLUCOSE SERPL-MCNC: 97 MG/DL
LDH SERPL-CCNC: 399 U/L
POTASSIUM SERPL-SCNC: 6.4 MMOL/L
PROT SERPL-MCNC: 7.4 G/DL
SODIUM SERPL-SCNC: 131 MMOL/L

## 2023-04-05 RX ORDER — SODIUM POLYSTYRENE SULFONATE 4.1 MEQ/G
POWDER, FOR SUSPENSION ORAL; RECTAL DAILY
Qty: 2 | Refills: 0 | Status: ACTIVE | COMMUNITY
Start: 2023-04-05 | End: 1900-01-01

## 2023-04-10 ENCOUNTER — LABORATORY RESULT (OUTPATIENT)
Age: 83
End: 2023-04-10

## 2023-04-11 ENCOUNTER — LABORATORY RESULT (OUTPATIENT)
Age: 83
End: 2023-04-11

## 2023-04-12 ENCOUNTER — LABORATORY RESULT (OUTPATIENT)
Age: 83
End: 2023-04-12

## 2023-04-14 ENCOUNTER — LABORATORY RESULT (OUTPATIENT)
Age: 83
End: 2023-04-14

## 2023-04-17 ENCOUNTER — NON-APPOINTMENT (OUTPATIENT)
Age: 83
End: 2023-04-17

## 2023-04-18 ENCOUNTER — NON-APPOINTMENT (OUTPATIENT)
Age: 83
End: 2023-04-18

## 2023-04-21 ENCOUNTER — LABORATORY RESULT (OUTPATIENT)
Age: 83
End: 2023-04-21

## 2023-04-24 ENCOUNTER — LABORATORY RESULT (OUTPATIENT)
Age: 83
End: 2023-04-24

## 2023-07-27 ENCOUNTER — OUTPATIENT (OUTPATIENT)
Dept: OUTPATIENT SERVICES | Facility: HOSPITAL | Age: 83
LOS: 1 days | Discharge: HOME | End: 2023-07-27

## 2023-07-27 DIAGNOSIS — D72.89 OTHER SPECIFIED DISORDERS OF WHITE BLOOD CELLS: ICD-10-CM

## 2023-08-07 ENCOUNTER — RESULT REVIEW (OUTPATIENT)
Age: 83
End: 2023-08-07

## 2023-08-07 ENCOUNTER — APPOINTMENT (OUTPATIENT)
Dept: HEMATOLOGY ONCOLOGY | Facility: CLINIC | Age: 83
End: 2023-08-07
Payer: MEDICARE

## 2023-08-07 VITALS
DIASTOLIC BLOOD PRESSURE: 84 MMHG | OXYGEN SATURATION: 98 % | HEART RATE: 65 BPM | RESPIRATION RATE: 16 BRPM | SYSTOLIC BLOOD PRESSURE: 137 MMHG | BODY MASS INDEX: 25.81 KG/M2 | TEMPERATURE: 97.2 F | WEIGHT: 136.66 LBS

## 2023-08-07 LAB
ANISOCYTOSIS BLD QL: SLIGHT — SIGNIFICANT CHANGE UP
BASOPHILS # BLD AUTO: 0.16 K/UL — SIGNIFICANT CHANGE UP (ref 0–0.2)
BASOPHILS NFR BLD AUTO: 1 % — SIGNIFICANT CHANGE UP (ref 0–2)
ELLIPTOCYTES BLD QL SMEAR: SLIGHT — SIGNIFICANT CHANGE UP
EOSINOPHIL # BLD AUTO: 0.93 K/UL — HIGH (ref 0–0.5)
EOSINOPHIL NFR BLD AUTO: 6 % — SIGNIFICANT CHANGE UP (ref 0–6)
GIANT PLATELETS BLD QL SMEAR: PRESENT — SIGNIFICANT CHANGE UP
HCT VFR BLD CALC: 35.7 % — SIGNIFICANT CHANGE UP (ref 34.5–45)
HGB BLD-MCNC: 10.9 G/DL — LOW (ref 11.5–15.5)
LG PLATELETS BLD QL AUTO: SLIGHT — SIGNIFICANT CHANGE UP
LYMPHOCYTES # BLD AUTO: 17 % — SIGNIFICANT CHANGE UP (ref 13–44)
LYMPHOCYTES # BLD AUTO: 2.65 K/UL — SIGNIFICANT CHANGE UP (ref 1–3.3)
MCHC RBC-ENTMCNC: 24.4 PG — LOW (ref 27–34)
MCHC RBC-ENTMCNC: 30.5 G/DL — LOW (ref 32–36)
MCV RBC AUTO: 79.9 FL — LOW (ref 80–100)
MICROCYTES BLD QL: SLIGHT — SIGNIFICANT CHANGE UP
MONOCYTES # BLD AUTO: 0.62 K/UL — SIGNIFICANT CHANGE UP (ref 0–0.9)
MONOCYTES NFR BLD AUTO: 4 % — SIGNIFICANT CHANGE UP (ref 2–14)
NEUTROPHILS # BLD AUTO: 11.21 K/UL — HIGH (ref 1.8–7.4)
NEUTROPHILS NFR BLD AUTO: 72 % — SIGNIFICANT CHANGE UP (ref 43–77)
NRBC # BLD: 1 /100 — HIGH (ref 0–0)
NRBC # BLD: SIGNIFICANT CHANGE UP /100 WBCS (ref 0–0)
PLAT MORPH BLD: ABNORMAL
PLATELET # BLD AUTO: 237 K/UL — SIGNIFICANT CHANGE UP (ref 150–400)
POIKILOCYTOSIS BLD QL AUTO: SLIGHT — SIGNIFICANT CHANGE UP
RBC # BLD: 4.47 M/UL — SIGNIFICANT CHANGE UP (ref 3.8–5.2)
RBC # FLD: 24.5 % — HIGH (ref 10.3–14.5)
RBC BLD AUTO: ABNORMAL
SCHISTOCYTES BLD QL AUTO: SLIGHT — SIGNIFICANT CHANGE UP
WBC # BLD: 15.57 K/UL — HIGH (ref 3.8–10.5)
WBC # FLD AUTO: 15.57 K/UL — HIGH (ref 3.8–10.5)

## 2023-08-07 PROCEDURE — 99215 OFFICE O/P EST HI 40 MIN: CPT

## 2023-08-07 NOTE — REVIEW OF SYSTEMS
[Recent Change In Weight] : ~T no recent weight change [Red Eyes] : eyes not red [Chest Pain] : no chest pain [Palpitations] : no palpitations [Leg Claudication] : no intermittent leg claudication [FreeTextEntry5] : lower extremity edema for past year

## 2023-08-07 NOTE — HISTORY OF PRESENT ILLNESS
[de-identified] : 82 yo woman originally from [facility] with PMhx of  HTN, HLD, referred for thrombocytosis.\par  \par  Patient has been feeling tired, denies fevers, night sweats, positive for  weight loss 10 pounds /6 months. Denies bleeding or thrombosis. \par  \par  Patient had a recent adverse reaction to atorvastatin, generalized muscle pain, she discontinued the medication on 7/7/2020. \par  \par  Patient lives with her daughter. \par  \par  7/1/2020 WBC 15.8, Hb 11.8 g/dl, Hct 39.1%, MCV 78.2, , neutrophils 8753, myelocytes 474, basophils 932.\par  \par  Bone marrow biopsy 7/6/2020:\par  hypercellular bone marrow with trilineage hematopoiesis and increase proportion of hyper lobated megakaryocytes, favor essential thrombocytosis. \par  Normal FISH for PDGFRA\par  Normal female karyotype. 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 \par   [de-identified] : Patient is not taking Anagrelide 0.5 mg BID, skips doses at times.  Denies fevers, night sweats, weight loss, bleeding, s/s thrombosis, headache, dizziness, chest pain, visual disturbances.   No other changes in medical, surgical or social history since 4/4/23.

## 2023-08-07 NOTE — ASSESSMENT
[FreeTextEntry1] : 84 yo woman originally from [facility] with PMhx of  HTN, HLD, and essential thrombocytosis-  Bone marrow biopsy 7/6/2020: hypercellular bone marrow with trilineage hematopoiesis and increase proportion of hyper lobated megakaryocytes, favor essential thrombocytosis.  Normal FISH for PDGFRA Normal female karyotype.  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    Will continue Anagrelide 0.5 mg twice daily 3 times per week and every other day once daily.  8/7/23-   Hb 10.9, Hct  35.7,  PLTs 237.  Recheck in 2 weeks Patient is also on Eliquis for A fib, by cardiology  Advised to f.u with Cardiologist MD Chow and PMD (MD Ginger Guerin)   RTC  2 months.

## 2023-08-08 LAB
ALBUMIN SERPL ELPH-MCNC: 4.6 G/DL
ALP BLD-CCNC: 147 U/L
ALT SERPL-CCNC: <5 U/L
ANION GAP SERPL CALC-SCNC: 14 MMOL/L
AST SERPL-CCNC: 16 U/L
BILIRUB SERPL-MCNC: 0.6 MG/DL
BUN SERPL-MCNC: 54 MG/DL
CALCIUM SERPL-MCNC: 9.9 MG/DL
CHLORIDE SERPL-SCNC: 103 MMOL/L
CO2 SERPL-SCNC: 20 MMOL/L
CREAT SERPL-MCNC: 2.5 MG/DL
EGFR: 18 ML/MIN/1.73M2
GLUCOSE SERPL-MCNC: 99 MG/DL
LDH SERPL-CCNC: 534 U/L
POTASSIUM SERPL-SCNC: 5.7 MMOL/L
PROT SERPL-MCNC: 7.3 G/DL
SODIUM SERPL-SCNC: 137 MMOL/L

## 2023-09-25 ENCOUNTER — RESULT REVIEW (OUTPATIENT)
Age: 83
End: 2023-09-25

## 2023-09-25 ENCOUNTER — APPOINTMENT (OUTPATIENT)
Dept: HEMATOLOGY ONCOLOGY | Facility: CLINIC | Age: 83
End: 2023-09-25
Payer: MEDICARE

## 2023-09-25 VITALS
SYSTOLIC BLOOD PRESSURE: 136 MMHG | TEMPERATURE: 97 F | HEART RATE: 78 BPM | RESPIRATION RATE: 16 BRPM | WEIGHT: 136.46 LBS | DIASTOLIC BLOOD PRESSURE: 87 MMHG | BODY MASS INDEX: 25.76 KG/M2 | OXYGEN SATURATION: 97 %

## 2023-09-25 LAB
ACANTHOCYTES BLD QL SMEAR: SLIGHT — SIGNIFICANT CHANGE UP
ANISOCYTOSIS BLD QL: SLIGHT — SIGNIFICANT CHANGE UP
BASOPHILS # BLD AUTO: 0.35 K/UL — HIGH (ref 0–0.2)
BASOPHILS NFR BLD AUTO: 2 % — SIGNIFICANT CHANGE UP (ref 0–2)
DACRYOCYTES BLD QL SMEAR: SLIGHT — SIGNIFICANT CHANGE UP
ELLIPTOCYTES BLD QL SMEAR: SIGNIFICANT CHANGE UP
EOSINOPHIL # BLD AUTO: 0.35 K/UL — SIGNIFICANT CHANGE UP (ref 0–0.5)
EOSINOPHIL NFR BLD AUTO: 2 % — SIGNIFICANT CHANGE UP (ref 0–6)
GIANT PLATELETS BLD QL SMEAR: PRESENT — SIGNIFICANT CHANGE UP
HCT VFR BLD CALC: 34.9 % — SIGNIFICANT CHANGE UP (ref 34.5–45)
HGB BLD-MCNC: 10.7 G/DL — LOW (ref 11.5–15.5)
LG PLATELETS BLD QL AUTO: SIGNIFICANT CHANGE UP
LYMPHOCYTES # BLD AUTO: 17 % — SIGNIFICANT CHANGE UP (ref 13–44)
LYMPHOCYTES # BLD AUTO: 3 K/UL — SIGNIFICANT CHANGE UP (ref 1–3.3)
MCHC RBC-ENTMCNC: 25.8 PG — LOW (ref 27–34)
MCHC RBC-ENTMCNC: 30.7 G/DL — LOW (ref 32–36)
MCV RBC AUTO: 84.3 FL — SIGNIFICANT CHANGE UP (ref 80–100)
METAMYELOCYTES # FLD: 1 % — HIGH (ref 0–0)
MONOCYTES # BLD AUTO: 1.23 K/UL — HIGH (ref 0–0.9)
MONOCYTES NFR BLD AUTO: 7 % — SIGNIFICANT CHANGE UP (ref 2–14)
MYELOCYTES NFR BLD: 1 % — HIGH (ref 0–0)
NEUTROPHILS # BLD AUTO: 12.33 K/UL — HIGH (ref 1.8–7.4)
NEUTROPHILS NFR BLD AUTO: 70 % — SIGNIFICANT CHANGE UP (ref 43–77)
NRBC # BLD: 0 /100 — SIGNIFICANT CHANGE UP (ref 0–0)
NRBC # BLD: SIGNIFICANT CHANGE UP /100 WBCS (ref 0–0)
PLAT MORPH BLD: ABNORMAL
PLATELET # BLD AUTO: 605 K/UL — HIGH (ref 150–400)
POIKILOCYTOSIS BLD QL AUTO: SIGNIFICANT CHANGE UP
RBC # BLD: 4.14 M/UL — SIGNIFICANT CHANGE UP (ref 3.8–5.2)
RBC # FLD: 25 % — HIGH (ref 10.3–14.5)
RBC BLD AUTO: ABNORMAL
SCHISTOCYTES BLD QL AUTO: SLIGHT — SIGNIFICANT CHANGE UP
WBC # BLD: 17.62 K/UL — HIGH (ref 3.8–10.5)
WBC # FLD AUTO: 17.62 K/UL — HIGH (ref 3.8–10.5)

## 2023-09-25 PROCEDURE — 99215 OFFICE O/P EST HI 40 MIN: CPT

## 2023-09-26 LAB
ALBUMIN SERPL ELPH-MCNC: 4.4 G/DL
ALP BLD-CCNC: 145 U/L
ALT SERPL-CCNC: <5 U/L
ANION GAP SERPL CALC-SCNC: 11 MMOL/L
AST SERPL-CCNC: 15 U/L
BILIRUB SERPL-MCNC: 0.4 MG/DL
BUN SERPL-MCNC: 35 MG/DL
CALCIUM SERPL-MCNC: 9.7 MG/DL
CHLORIDE SERPL-SCNC: 104 MMOL/L
CO2 SERPL-SCNC: 25 MMOL/L
CREAT SERPL-MCNC: 2.23 MG/DL
EGFR: 21 ML/MIN/1.73M2
GLUCOSE SERPL-MCNC: 126 MG/DL
LDH SERPL-CCNC: 444 U/L
POTASSIUM SERPL-SCNC: 5.3 MMOL/L
PROT SERPL-MCNC: 7.1 G/DL
SODIUM SERPL-SCNC: 140 MMOL/L

## 2023-11-08 ENCOUNTER — OUTPATIENT (OUTPATIENT)
Dept: OUTPATIENT SERVICES | Facility: HOSPITAL | Age: 83
LOS: 1 days | Discharge: HOME | End: 2023-11-08

## 2023-11-08 DIAGNOSIS — D72.89 OTHER SPECIFIED DISORDERS OF WHITE BLOOD CELLS: ICD-10-CM

## 2023-11-27 ENCOUNTER — RESULT REVIEW (OUTPATIENT)
Age: 83
End: 2023-11-27

## 2023-11-27 ENCOUNTER — APPOINTMENT (OUTPATIENT)
Dept: HEMATOLOGY ONCOLOGY | Facility: CLINIC | Age: 83
End: 2023-11-27
Payer: MEDICARE

## 2023-11-27 VITALS
RESPIRATION RATE: 16 BRPM | BODY MASS INDEX: 24.72 KG/M2 | OXYGEN SATURATION: 97 % | TEMPERATURE: 96.8 F | DIASTOLIC BLOOD PRESSURE: 81 MMHG | WEIGHT: 130.95 LBS | HEART RATE: 74 BPM | SYSTOLIC BLOOD PRESSURE: 145 MMHG

## 2023-11-27 DIAGNOSIS — N18.9 CHRONIC KIDNEY DISEASE, UNSPECIFIED: ICD-10-CM

## 2023-11-27 LAB
ANISOCYTOSIS BLD QL: SLIGHT — SIGNIFICANT CHANGE UP
ANISOCYTOSIS BLD QL: SLIGHT — SIGNIFICANT CHANGE UP
BASOPHILS # BLD AUTO: 0.47 K/UL — HIGH (ref 0–0.2)
BASOPHILS # BLD AUTO: 0.47 K/UL — HIGH (ref 0–0.2)
BASOPHILS NFR BLD AUTO: 2.5 % — HIGH (ref 0–2)
BASOPHILS NFR BLD AUTO: 2.5 % — HIGH (ref 0–2)
DACRYOCYTES BLD QL SMEAR: SLIGHT — SIGNIFICANT CHANGE UP
DACRYOCYTES BLD QL SMEAR: SLIGHT — SIGNIFICANT CHANGE UP
ELLIPTOCYTES BLD QL SMEAR: SIGNIFICANT CHANGE UP
ELLIPTOCYTES BLD QL SMEAR: SIGNIFICANT CHANGE UP
EOSINOPHIL # BLD AUTO: 0.33 K/UL — SIGNIFICANT CHANGE UP (ref 0–0.5)
EOSINOPHIL # BLD AUTO: 0.33 K/UL — SIGNIFICANT CHANGE UP (ref 0–0.5)
EOSINOPHIL NFR BLD AUTO: 1.8 % — SIGNIFICANT CHANGE UP (ref 0–6)
EOSINOPHIL NFR BLD AUTO: 1.8 % — SIGNIFICANT CHANGE UP (ref 0–6)
GIANT PLATELETS BLD QL SMEAR: PRESENT — SIGNIFICANT CHANGE UP
GIANT PLATELETS BLD QL SMEAR: PRESENT — SIGNIFICANT CHANGE UP
HCT VFR BLD CALC: 35.9 % — SIGNIFICANT CHANGE UP (ref 34.5–45)
HCT VFR BLD CALC: 35.9 % — SIGNIFICANT CHANGE UP (ref 34.5–45)
HGB BLD-MCNC: 10.8 G/DL — LOW (ref 11.5–15.5)
HGB BLD-MCNC: 10.8 G/DL — LOW (ref 11.5–15.5)
IMM GRANULOCYTES NFR BLD AUTO: 2 % — HIGH (ref 0–0.9)
IMM GRANULOCYTES NFR BLD AUTO: 2 % — HIGH (ref 0–0.9)
LG PLATELETS BLD QL AUTO: SLIGHT — SIGNIFICANT CHANGE UP
LG PLATELETS BLD QL AUTO: SLIGHT — SIGNIFICANT CHANGE UP
LYMPHOCYTES # BLD AUTO: 13.1 % — SIGNIFICANT CHANGE UP (ref 13–44)
LYMPHOCYTES # BLD AUTO: 13.1 % — SIGNIFICANT CHANGE UP (ref 13–44)
LYMPHOCYTES # BLD AUTO: 2.45 K/UL — SIGNIFICANT CHANGE UP (ref 1–3.3)
LYMPHOCYTES # BLD AUTO: 2.45 K/UL — SIGNIFICANT CHANGE UP (ref 1–3.3)
MCHC RBC-ENTMCNC: 25.5 PG — LOW (ref 27–34)
MCHC RBC-ENTMCNC: 25.5 PG — LOW (ref 27–34)
MCHC RBC-ENTMCNC: 30.1 G/DL — LOW (ref 32–36)
MCHC RBC-ENTMCNC: 30.1 G/DL — LOW (ref 32–36)
MCV RBC AUTO: 84.9 FL — SIGNIFICANT CHANGE UP (ref 80–100)
MCV RBC AUTO: 84.9 FL — SIGNIFICANT CHANGE UP (ref 80–100)
MONOCYTES # BLD AUTO: 1.1 K/UL — HIGH (ref 0–0.9)
MONOCYTES # BLD AUTO: 1.1 K/UL — HIGH (ref 0–0.9)
MONOCYTES NFR BLD AUTO: 5.9 % — SIGNIFICANT CHANGE UP (ref 2–14)
MONOCYTES NFR BLD AUTO: 5.9 % — SIGNIFICANT CHANGE UP (ref 2–14)
NEUTROPHILS # BLD AUTO: 13.94 K/UL — HIGH (ref 1.8–7.4)
NEUTROPHILS # BLD AUTO: 13.94 K/UL — HIGH (ref 1.8–7.4)
NEUTROPHILS NFR BLD AUTO: 74.7 % — SIGNIFICANT CHANGE UP (ref 43–77)
NEUTROPHILS NFR BLD AUTO: 74.7 % — SIGNIFICANT CHANGE UP (ref 43–77)
NRBC # BLD: 0 /100 WBCS — SIGNIFICANT CHANGE UP (ref 0–0)
NRBC # BLD: 0 /100 WBCS — SIGNIFICANT CHANGE UP (ref 0–0)
PLAT MORPH BLD: ABNORMAL
PLAT MORPH BLD: ABNORMAL
PLATELET # BLD AUTO: 510 K/UL — HIGH (ref 150–400)
PLATELET # BLD AUTO: 510 K/UL — HIGH (ref 150–400)
POIKILOCYTOSIS BLD QL AUTO: SIGNIFICANT CHANGE UP
POIKILOCYTOSIS BLD QL AUTO: SIGNIFICANT CHANGE UP
RBC # BLD: 4.23 M/UL — SIGNIFICANT CHANGE UP (ref 3.8–5.2)
RBC # BLD: 4.23 M/UL — SIGNIFICANT CHANGE UP (ref 3.8–5.2)
RBC # FLD: 22.5 % — HIGH (ref 10.3–14.5)
RBC # FLD: 22.5 % — HIGH (ref 10.3–14.5)
RBC BLD AUTO: ABNORMAL
RBC BLD AUTO: ABNORMAL
SCHISTOCYTES BLD QL AUTO: SLIGHT — SIGNIFICANT CHANGE UP
SCHISTOCYTES BLD QL AUTO: SLIGHT — SIGNIFICANT CHANGE UP
WBC # BLD: 18.67 K/UL — HIGH (ref 3.8–10.5)
WBC # BLD: 18.67 K/UL — HIGH (ref 3.8–10.5)
WBC # FLD AUTO: 18.67 K/UL — HIGH (ref 3.8–10.5)
WBC # FLD AUTO: 18.67 K/UL — HIGH (ref 3.8–10.5)

## 2023-11-27 PROCEDURE — 99215 OFFICE O/P EST HI 40 MIN: CPT

## 2024-02-20 ENCOUNTER — OUTPATIENT (OUTPATIENT)
Dept: OUTPATIENT SERVICES | Facility: HOSPITAL | Age: 84
LOS: 1 days | Discharge: HOME | End: 2024-02-20

## 2024-02-20 DIAGNOSIS — D72.89 OTHER SPECIFIED DISORDERS OF WHITE BLOOD CELLS: ICD-10-CM

## 2024-02-21 ENCOUNTER — RX RENEWAL (OUTPATIENT)
Age: 84
End: 2024-02-21

## 2024-02-21 RX ORDER — ANAGRELIDE 0.5 MG/1
0.5 CAPSULE ORAL
Qty: 30 | Refills: 6 | Status: ACTIVE | COMMUNITY
Start: 2023-04-05 | End: 1900-01-01

## 2024-03-01 DIAGNOSIS — D75.839 THROMBOCYTOSIS, UNSPECIFIED: ICD-10-CM

## 2024-03-04 ENCOUNTER — RESULT REVIEW (OUTPATIENT)
Age: 84
End: 2024-03-04

## 2024-03-04 ENCOUNTER — APPOINTMENT (OUTPATIENT)
Dept: HEMATOLOGY ONCOLOGY | Facility: CLINIC | Age: 84
End: 2024-03-04
Payer: MEDICARE

## 2024-03-04 VITALS
RESPIRATION RATE: 16 BRPM | SYSTOLIC BLOOD PRESSURE: 138 MMHG | DIASTOLIC BLOOD PRESSURE: 73 MMHG | OXYGEN SATURATION: 99 % | TEMPERATURE: 97.6 F | HEIGHT: 61.02 IN | WEIGHT: 129 LBS | HEART RATE: 100 BPM | BODY MASS INDEX: 24.35 KG/M2

## 2024-03-04 LAB
ANISOCYTOSIS BLD QL: SIGNIFICANT CHANGE UP
BASOPHILS # BLD AUTO: 0.2 K/UL — SIGNIFICANT CHANGE UP (ref 0–0.2)
BASOPHILS NFR BLD AUTO: 1 % — SIGNIFICANT CHANGE UP (ref 0–2)
DACRYOCYTES BLD QL SMEAR: SLIGHT — SIGNIFICANT CHANGE UP
ELLIPTOCYTES BLD QL SMEAR: SIGNIFICANT CHANGE UP
EOSINOPHIL # BLD AUTO: 1.42 K/UL — HIGH (ref 0–0.5)
EOSINOPHIL NFR BLD AUTO: 7 % — HIGH (ref 0–6)
GIANT PLATELETS BLD QL SMEAR: PRESENT — SIGNIFICANT CHANGE UP
HCT VFR BLD CALC: 38.3 % — SIGNIFICANT CHANGE UP (ref 34.5–45)
HGB BLD-MCNC: 11.3 G/DL — LOW (ref 11.5–15.5)
LG PLATELETS BLD QL AUTO: SIGNIFICANT CHANGE UP
LYMPHOCYTES # BLD AUTO: 14 % — SIGNIFICANT CHANGE UP (ref 13–44)
LYMPHOCYTES # BLD AUTO: 2.84 K/UL — SIGNIFICANT CHANGE UP (ref 1–3.3)
MCHC RBC-ENTMCNC: 23.9 PG — LOW (ref 27–34)
MCHC RBC-ENTMCNC: 29.5 G/DL — LOW (ref 32–36)
MCV RBC AUTO: 81.1 FL — SIGNIFICANT CHANGE UP (ref 80–100)
METAMYELOCYTES # FLD: 1 % — HIGH (ref 0–0)
MONOCYTES # BLD AUTO: 1.22 K/UL — HIGH (ref 0–0.9)
MONOCYTES NFR BLD AUTO: 6 % — SIGNIFICANT CHANGE UP (ref 2–14)
MYELOCYTES NFR BLD: 1 % — HIGH (ref 0–0)
NEUTROPHILS # BLD AUTO: 14.2 K/UL — HIGH (ref 1.8–7.4)
NEUTROPHILS NFR BLD AUTO: 70 % — SIGNIFICANT CHANGE UP (ref 43–77)
NRBC # BLD: 0 /100 WBCS — SIGNIFICANT CHANGE UP (ref 0–0)
NRBC # BLD: SIGNIFICANT CHANGE UP /100 WBCS (ref 0–0)
PLAT MORPH BLD: ABNORMAL
PLATELET # BLD AUTO: 1246 K/UL — CRITICAL HIGH (ref 150–400)
POIKILOCYTOSIS BLD QL AUTO: SIGNIFICANT CHANGE UP
RBC # BLD: 4.72 M/UL — SIGNIFICANT CHANGE UP (ref 3.8–5.2)
RBC # FLD: 22.4 % — HIGH (ref 10.3–14.5)
RBC BLD AUTO: ABNORMAL
SCHISTOCYTES BLD QL AUTO: SLIGHT — SIGNIFICANT CHANGE UP
WBC # BLD: 20.28 K/UL — HIGH (ref 3.8–10.5)
WBC # FLD AUTO: 20.28 K/UL — HIGH (ref 3.8–10.5)

## 2024-03-04 PROCEDURE — 99215 OFFICE O/P EST HI 40 MIN: CPT

## 2024-03-04 RX ORDER — ANAGRELIDE 0.5 MG/1
0.5 CAPSULE ORAL
Qty: 60 | Refills: 6 | Status: ACTIVE | COMMUNITY
Start: 2023-09-25 | End: 1900-01-01

## 2024-03-04 NOTE — ASSESSMENT
[FreeTextEntry1] : 82 yo woman originally from [facility] with PMhx of HTN, HLD, and essential thrombocytosis-  Bone marrow biopsy 7/6/2020: hypercellular bone marrow with trilineage hematopoiesis and increase proportion of hyper lobated megakaryocytes, favor essential thrombocytosis. Normal FISH for PDGFRA Normal female karyotype. 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   Continue   Anagrelide 0.5 mg twice daily Monday through Friday and One tablet on Saturday and Sunday. 3/4/24- WBC 20.28, Hb 11.3g/dl, Hct 38.3, PLTs 1246. Patient was not taking Anagrelide 0.5 mg BID M-F and once daily on Saturday and Sunday. Patient came to this appointment with her daughter who is a physician.  Patient is also on Eliquis for A fib, by cardiology Advised to f.u with Cardiologist MD Chow and PMD (MD Ginger Guerin)  Chronic renal insufficiency: Patient declined referral to nephrology.   Greater than 50% of the encounter time was spent on counseling and coordination of care for ET and I have spent 40 minutes of face to face time with the patient.  RTC 3 months.

## 2024-03-04 NOTE — REVIEW OF SYSTEMS
[Lower Ext Edema] : lower extremity edema [Negative] : Allergic/Immunologic [Red Eyes] : eyes not red [Recent Change In Weight] : ~T no recent weight change [Chest Pain] : no chest pain [Palpitations] : no palpitations [Leg Claudication] : no intermittent leg claudication [FreeTextEntry5] : lower extremity edema for past year

## 2024-03-04 NOTE — HISTORY OF PRESENT ILLNESS
[0 - No Distress] : Distress Level: 0 [80: Normal activity with effort; some signs or symptoms of disease.] : 80: Normal activity with effort; some signs or symptoms of disease.  [de-identified] : 82 yo woman originally from [facility] with PMhx of  HTN, HLD, referred for thrombocytosis.\par  \par  Patient has been feeling tired, denies fevers, night sweats, positive for  weight loss 10 pounds /6 months. Denies bleeding or thrombosis. \par  \par  Patient had a recent adverse reaction to atorvastatin, generalized muscle pain, she discontinued the medication on 7/7/2020. \par  \par  Patient lives with her daughter. \par  \par  7/1/2020 WBC 15.8, Hb 11.8 g/dl, Hct 39.1%, MCV 78.2, , neutrophils 8753, myelocytes 474, basophils 932.\par  \par  Bone marrow biopsy 7/6/2020:\par  hypercellular bone marrow with trilineage hematopoiesis and increase proportion of hyper lobated megakaryocytes, favor essential thrombocytosis. \par  Normal FISH for PDGFRA\par  Normal female karyotype. 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 \par   [de-identified] : Patient is   taking Anagrelide 0.5 mg BID M-W-F and once all other days.  Denies fevers, night sweats, weight loss, bleeding, s/s thrombosis, headache, dizziness, chest pain, visual disturbances.   No other changes in medical, surgical or social history since 11/27/23.

## 2024-03-04 NOTE — PHYSICAL EXAM
[Ambulatory and capable of all self care but unable to carry out any work activities] : Status 2- Ambulatory and capable of all self care but unable to carry out any work activities. Up and about more than 50% of waking hours [Normal] : affect appropriate [de-identified] : Irregular rhythm [de-identified] : + 1 edema LLE

## 2024-03-05 LAB
ALBUMIN SERPL ELPH-MCNC: 4.6 G/DL
ALP BLD-CCNC: 121 U/L
ALT SERPL-CCNC: <5 U/L
ANION GAP SERPL CALC-SCNC: 12 MMOL/L
AST SERPL-CCNC: 13 U/L
BILIRUB SERPL-MCNC: 0.4 MG/DL
BUN SERPL-MCNC: 36 MG/DL
CALCIUM SERPL-MCNC: 9.8 MG/DL
CHLORIDE SERPL-SCNC: 102 MMOL/L
CO2 SERPL-SCNC: 24 MMOL/L
CREAT SERPL-MCNC: 2.26 MG/DL
EGFR: 21 ML/MIN/1.73M2
GLUCOSE SERPL-MCNC: 101 MG/DL
LDH SERPL-CCNC: 468 U/L
POTASSIUM SERPL-SCNC: 5.9 MMOL/L
PROT SERPL-MCNC: 7.1 G/DL
SODIUM SERPL-SCNC: 138 MMOL/L

## 2024-06-13 ENCOUNTER — OUTPATIENT (OUTPATIENT)
Dept: OUTPATIENT SERVICES | Facility: HOSPITAL | Age: 84
LOS: 1 days | Discharge: HOME | End: 2024-06-13

## 2024-06-13 DIAGNOSIS — D72.89 OTHER SPECIFIED DISORDERS OF WHITE BLOOD CELLS: ICD-10-CM

## 2024-06-16 ENCOUNTER — NON-APPOINTMENT (OUTPATIENT)
Age: 84
End: 2024-06-16

## 2024-06-17 ENCOUNTER — APPOINTMENT (OUTPATIENT)
Dept: HEMATOLOGY ONCOLOGY | Facility: CLINIC | Age: 84
End: 2024-06-17
Payer: MEDICARE

## 2024-06-17 ENCOUNTER — RESULT REVIEW (OUTPATIENT)
Age: 84
End: 2024-06-17

## 2024-06-17 VITALS
SYSTOLIC BLOOD PRESSURE: 150 MMHG | RESPIRATION RATE: 16 BRPM | WEIGHT: 127.43 LBS | HEIGHT: 61.02 IN | OXYGEN SATURATION: 95 % | TEMPERATURE: 98.1 F | BODY MASS INDEX: 24.06 KG/M2 | DIASTOLIC BLOOD PRESSURE: 94 MMHG | HEART RATE: 111 BPM

## 2024-06-17 DIAGNOSIS — D47.3 ESSENTIAL (HEMORRHAGIC) THROMBOCYTHEMIA: ICD-10-CM

## 2024-06-17 LAB
ANISOCYTOSIS BLD QL: SLIGHT — SIGNIFICANT CHANGE UP
BASOPHILS # BLD AUTO: 0.39 K/UL — HIGH (ref 0–0.2)
BASOPHILS NFR BLD AUTO: 2.1 % — HIGH (ref 0–2)
DACRYOCYTES BLD QL SMEAR: SLIGHT — SIGNIFICANT CHANGE UP
ELLIPTOCYTES BLD QL SMEAR: SIGNIFICANT CHANGE UP
EOSINOPHIL # BLD AUTO: 0.59 K/UL — HIGH (ref 0–0.5)
EOSINOPHIL NFR BLD AUTO: 3.2 % — SIGNIFICANT CHANGE UP (ref 0–6)
GIANT PLATELETS BLD QL SMEAR: PRESENT — SIGNIFICANT CHANGE UP
HCT VFR BLD CALC: 35.7 % — SIGNIFICANT CHANGE UP (ref 34.5–45)
HGB BLD-MCNC: 10.7 G/DL — LOW (ref 11.5–15.5)
IMM GRANULOCYTES NFR BLD AUTO: 1.9 % — HIGH (ref 0–0.9)
LG PLATELETS BLD QL AUTO: SIGNIFICANT CHANGE UP
LYMPHOCYTES # BLD AUTO: 14.2 % — SIGNIFICANT CHANGE UP (ref 13–44)
LYMPHOCYTES # BLD AUTO: 2.64 K/UL — SIGNIFICANT CHANGE UP (ref 1–3.3)
MCHC RBC-ENTMCNC: 24 PG — LOW (ref 27–34)
MCHC RBC-ENTMCNC: 30 G/DL — LOW (ref 32–36)
MCV RBC AUTO: 80.2 FL — SIGNIFICANT CHANGE UP (ref 80–100)
MONOCYTES # BLD AUTO: 1.3 K/UL — HIGH (ref 0–0.9)
MONOCYTES NFR BLD AUTO: 7 % — SIGNIFICANT CHANGE UP (ref 2–14)
NEUTROPHILS # BLD AUTO: 13.37 K/UL — HIGH (ref 1.8–7.4)
NEUTROPHILS NFR BLD AUTO: 71.6 % — SIGNIFICANT CHANGE UP (ref 43–77)
NRBC # BLD: 0 /100 WBCS — SIGNIFICANT CHANGE UP (ref 0–0)
PLAT MORPH BLD: ABNORMAL
PLATELET # BLD AUTO: 570 K/UL — HIGH (ref 150–400)
POIKILOCYTOSIS BLD QL AUTO: SIGNIFICANT CHANGE UP
RBC # BLD: 4.45 M/UL — SIGNIFICANT CHANGE UP (ref 3.8–5.2)
RBC # FLD: 25.6 % — HIGH (ref 10.3–14.5)
RBC BLD AUTO: ABNORMAL
SCHISTOCYTES BLD QL AUTO: SLIGHT — SIGNIFICANT CHANGE UP
WBC # BLD: 18.65 K/UL — HIGH (ref 3.8–10.5)
WBC # FLD AUTO: 18.65 K/UL — HIGH (ref 3.8–10.5)

## 2024-06-17 PROCEDURE — 99214 OFFICE O/P EST MOD 30 MIN: CPT

## 2024-06-17 NOTE — PHYSICAL EXAM
[Ambulatory and capable of all self care but unable to carry out any work activities] : Status 2- Ambulatory and capable of all self care but unable to carry out any work activities. Up and about more than 50% of waking hours [Normal] : affect appropriate [de-identified] : Irregular rhythm [de-identified] : + 1 edema LLE

## 2024-06-17 NOTE — ASSESSMENT
[FreeTextEntry1] : 83 yo woman originally from [facility] with PMhx of HTN, HLD, and essential thrombocytosis-  Bone marrow biopsy 7/6/2020: hypercellular bone marrow with trilineage hematopoiesis and increase proportion of hyper lobated megakaryocytes, favor essential thrombocytosis. Normal FISH for PDGFRA Normal female karyotype. 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   Continue   Anagrelide 0.5 mg twice daily Monday through Friday and One tablet on Saturday and Sunday. 6/17/24- WBC  18.65 , Hb  10.7g/dl, Hct 35.7 , PLTs  570. Patient was not taking Anagrelide 0.5 mg BID M-F and once daily on Saturday and Sunday. Patient came to this appointment with her daughter who is a physician.  Patient is also on Eliquis for A fib, by cardiology Advised to f.u with Cardiologist MD Chow and PMD (MD Ginger Guerin)  Chronic renal insufficiency: Patient declined referral to nephrology.   Greater than 50% of the encounter time was spent on counseling and coordination of care for ET and I have spent 40 minutes of face to face time with the patient.  RTC 3 months.

## 2024-06-17 NOTE — HISTORY OF PRESENT ILLNESS
[0 - No Distress] : Distress Level: 0 [80: Normal activity with effort; some signs or symptoms of disease.] : 80: Normal activity with effort; some signs or symptoms of disease.  [de-identified] : 82 yo woman originally from [facility] with PMhx of  HTN, HLD, referred for thrombocytosis.\par  \par  Patient has been feeling tired, denies fevers, night sweats, positive for  weight loss 10 pounds /6 months. Denies bleeding or thrombosis. \par  \par  Patient had a recent adverse reaction to atorvastatin, generalized muscle pain, she discontinued the medication on 7/7/2020. \par  \par  Patient lives with her daughter. \par  \par  7/1/2020 WBC 15.8, Hb 11.8 g/dl, Hct 39.1%, MCV 78.2, , neutrophils 8753, myelocytes 474, basophils 932.\par  \par  Bone marrow biopsy 7/6/2020:\par  hypercellular bone marrow with trilineage hematopoiesis and increase proportion of hyper lobated megakaryocytes, favor essential thrombocytosis. \par  Normal FISH for PDGFRA\par  Normal female karyotype. 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 \par   [de-identified] : Patient is   taking Anagrelide 0.5 mg BID M-W-F and once all other days.  Denies fevers, night sweats, weight loss, bleeding, s/s thrombosis, headache, dizziness, chest pain, visual disturbances.   No other changes in medical, surgical or social history since 3/4/24.

## 2024-06-24 LAB
ALBUMIN SERPL ELPH-MCNC: 4.4 G/DL
ALP BLD-CCNC: 121 U/L
ALT SERPL-CCNC: 5 U/L
ANION GAP SERPL CALC-SCNC: 14 MMOL/L
AST SERPL-CCNC: 19 U/L
BILIRUB SERPL-MCNC: 0.4 MG/DL
BUN SERPL-MCNC: 46 MG/DL
CALCIUM SERPL-MCNC: 9.5 MG/DL
CHLORIDE SERPL-SCNC: 104 MMOL/L
CO2 SERPL-SCNC: 20 MMOL/L
CREAT SERPL-MCNC: 2.18 MG/DL
EGFR: 22 ML/MIN/1.73M2
GLUCOSE SERPL-MCNC: 98 MG/DL
LDH SERPL-CCNC: 520 U/L
POTASSIUM SERPL-SCNC: 6.1 MMOL/L
PROT SERPL-MCNC: 7 G/DL
SODIUM SERPL-SCNC: 138 MMOL/L

## 2024-10-02 ENCOUNTER — OUTPATIENT (OUTPATIENT)
Dept: OUTPATIENT SERVICES | Facility: HOSPITAL | Age: 84
LOS: 1 days | Discharge: HOME | End: 2024-10-02

## 2024-10-02 DIAGNOSIS — D72.89 OTHER SPECIFIED DISORDERS OF WHITE BLOOD CELLS: ICD-10-CM

## 2024-10-03 DIAGNOSIS — D47.3 ESSENTIAL (HEMORRHAGIC) THROMBOCYTHEMIA: ICD-10-CM

## 2024-10-14 ENCOUNTER — RESULT REVIEW (OUTPATIENT)
Age: 84
End: 2024-10-14

## 2024-10-14 ENCOUNTER — APPOINTMENT (OUTPATIENT)
Dept: HEMATOLOGY ONCOLOGY | Facility: CLINIC | Age: 84
End: 2024-10-14
Payer: MEDICARE

## 2024-10-14 VITALS
RESPIRATION RATE: 15 BRPM | SYSTOLIC BLOOD PRESSURE: 120 MMHG | DIASTOLIC BLOOD PRESSURE: 82 MMHG | TEMPERATURE: 96.8 F | BODY MASS INDEX: 23.31 KG/M2 | WEIGHT: 123.46 LBS | OXYGEN SATURATION: 100 % | HEART RATE: 99 BPM

## 2024-10-14 LAB
ANISOCYTOSIS BLD QL: SLIGHT — SIGNIFICANT CHANGE UP
BASOPHILS # BLD AUTO: 0.26 K/UL — HIGH (ref 0–0.2)
BASOPHILS NFR BLD AUTO: 1 % — SIGNIFICANT CHANGE UP (ref 0–2)
BLASTS # FLD: 1 % — HIGH (ref 0–0)
BURR CELLS BLD QL SMEAR: PRESENT — SIGNIFICANT CHANGE UP
DACRYOCYTES BLD QL SMEAR: SLIGHT — SIGNIFICANT CHANGE UP
ELLIPTOCYTES BLD QL SMEAR: SIGNIFICANT CHANGE UP
EOSINOPHIL # BLD AUTO: 0.26 K/UL — SIGNIFICANT CHANGE UP (ref 0–0.5)
EOSINOPHIL NFR BLD AUTO: 1 % — SIGNIFICANT CHANGE UP (ref 0–6)
GIANT PLATELETS BLD QL SMEAR: PRESENT — SIGNIFICANT CHANGE UP
HCT VFR BLD CALC: 35.1 % — SIGNIFICANT CHANGE UP (ref 34.5–45)
HGB BLD-MCNC: 10.4 G/DL — LOW (ref 11.5–15.5)
HYPOGRANULAR PLT: PRESENT
LG PLATELETS BLD QL AUTO: SIGNIFICANT CHANGE UP
LYMPHOCYTES # BLD AUTO: 12 % — LOW (ref 13–44)
LYMPHOCYTES # BLD AUTO: 3.11 K/UL — SIGNIFICANT CHANGE UP (ref 1–3.3)
MCHC RBC-ENTMCNC: 24.7 PG — LOW (ref 27–34)
MCHC RBC-ENTMCNC: 29.6 G/DL — LOW (ref 32–36)
MCV RBC AUTO: 83.4 FL — SIGNIFICANT CHANGE UP (ref 80–100)
METAMYELOCYTES # FLD: 2 % — HIGH (ref 0–0)
MONOCYTES # BLD AUTO: 0.78 K/UL — SIGNIFICANT CHANGE UP (ref 0–0.9)
MONOCYTES NFR BLD AUTO: 3 % — SIGNIFICANT CHANGE UP (ref 2–14)
MYELOCYTES NFR BLD: 4 % — HIGH (ref 0–0)
NEUTROPHILS # BLD AUTO: 19.67 K/UL — HIGH (ref 1.8–7.4)
NEUTROPHILS NFR BLD AUTO: 76 % — SIGNIFICANT CHANGE UP (ref 43–77)
NRBC # BLD: 0 /100 WBCS — SIGNIFICANT CHANGE UP (ref 0–0)
NRBC # BLD: SIGNIFICANT CHANGE UP /100 WBCS (ref 0–0)
PLAT MORPH BLD: ABNORMAL
PLATELET # BLD AUTO: 297 K/UL — SIGNIFICANT CHANGE UP (ref 150–400)
POIKILOCYTOSIS BLD QL AUTO: SIGNIFICANT CHANGE UP
RBC # BLD: 4.21 M/UL — SIGNIFICANT CHANGE UP (ref 3.8–5.2)
RBC # FLD: 24.8 % — HIGH (ref 10.3–14.5)
RBC BLD AUTO: ABNORMAL
SCHISTOCYTES BLD QL AUTO: SLIGHT — SIGNIFICANT CHANGE UP
WBC # BLD: 25.88 K/UL — HIGH (ref 3.8–10.5)
WBC # FLD AUTO: 25.88 K/UL — HIGH (ref 3.8–10.5)

## 2024-10-14 PROCEDURE — 99214 OFFICE O/P EST MOD 30 MIN: CPT

## 2024-10-15 ENCOUNTER — NON-APPOINTMENT (OUTPATIENT)
Age: 84
End: 2024-10-15

## 2024-10-16 DIAGNOSIS — D47.3 ESSENTIAL (HEMORRHAGIC) THROMBOCYTHEMIA: ICD-10-CM

## 2024-10-28 ENCOUNTER — INPATIENT (INPATIENT)
Facility: HOSPITAL | Age: 84
LOS: 9 days | Discharge: NURSING, REHAB OR LONG-TERM CARE | End: 2024-11-07
Attending: STUDENT IN AN ORGANIZED HEALTH CARE EDUCATION/TRAINING PROGRAM | Admitting: STUDENT IN AN ORGANIZED HEALTH CARE EDUCATION/TRAINING PROGRAM
Payer: MEDICARE

## 2024-10-28 VITALS
SYSTOLIC BLOOD PRESSURE: 142 MMHG | DIASTOLIC BLOOD PRESSURE: 74 MMHG | TEMPERATURE: 97 F | OXYGEN SATURATION: 96 % | HEART RATE: 76 BPM | WEIGHT: 126.1 LBS | HEIGHT: 59 IN | RESPIRATION RATE: 17 BRPM

## 2024-10-28 DIAGNOSIS — N17.9 ACUTE KIDNEY FAILURE, UNSPECIFIED: ICD-10-CM

## 2024-10-28 DIAGNOSIS — E87.20 ACIDOSIS, UNSPECIFIED: ICD-10-CM

## 2024-10-28 LAB
ALBUMIN SERPL ELPH-MCNC: 3.3 G/DL — SIGNIFICANT CHANGE UP (ref 3.3–5)
ALBUMIN SERPL ELPH-MCNC: 3.7 G/DL — SIGNIFICANT CHANGE UP (ref 3.3–5)
ALP SERPL-CCNC: 219 U/L — HIGH (ref 40–120)
ALP SERPL-CCNC: 254 U/L — HIGH (ref 40–120)
ALT FLD-CCNC: 663 U/L — HIGH (ref 4–33)
ALT FLD-CCNC: 780 U/L — HIGH (ref 4–33)
ANION GAP SERPL CALC-SCNC: 26 MMOL/L — HIGH (ref 7–14)
ANION GAP SERPL CALC-SCNC: 27 MMOL/L — HIGH (ref 7–14)
ANISOCYTOSIS BLD QL: SIGNIFICANT CHANGE UP
AST SERPL-CCNC: 1198 U/L — HIGH (ref 4–32)
AST SERPL-CCNC: 1646 U/L — HIGH (ref 4–32)
BASOPHILS # BLD AUTO: 0 K/UL — SIGNIFICANT CHANGE UP (ref 0–0.2)
BASOPHILS NFR BLD AUTO: 0 % — SIGNIFICANT CHANGE UP (ref 0–2)
BILIRUB SERPL-MCNC: 2 MG/DL — HIGH (ref 0.2–1.2)
BILIRUB SERPL-MCNC: 2.2 MG/DL — HIGH (ref 0.2–1.2)
BLOOD GAS VENOUS COMPREHENSIVE RESULT: SIGNIFICANT CHANGE UP
BUN SERPL-MCNC: 94 MG/DL — HIGH (ref 7–23)
BUN SERPL-MCNC: 98 MG/DL — HIGH (ref 7–23)
BURR CELLS BLD QL SMEAR: PRESENT — SIGNIFICANT CHANGE UP
CALCIUM SERPL-MCNC: 8.2 MG/DL — LOW (ref 8.4–10.5)
CALCIUM SERPL-MCNC: 8.7 MG/DL — SIGNIFICANT CHANGE UP (ref 8.4–10.5)
CHLORIDE SERPL-SCNC: 95 MMOL/L — LOW (ref 98–107)
CHLORIDE SERPL-SCNC: 98 MMOL/L — SIGNIFICANT CHANGE UP (ref 98–107)
CO2 SERPL-SCNC: 15 MMOL/L — LOW (ref 22–31)
CO2 SERPL-SCNC: 9 MMOL/L — CRITICAL LOW (ref 22–31)
CREAT SERPL-MCNC: 4.28 MG/DL — HIGH (ref 0.5–1.3)
CREAT SERPL-MCNC: 4.33 MG/DL — HIGH (ref 0.5–1.3)
EGFR: 10 ML/MIN/1.73M2 — LOW
EGFR: 10 ML/MIN/1.73M2 — LOW
EOSINOPHIL # BLD AUTO: 0 K/UL — SIGNIFICANT CHANGE UP (ref 0–0.5)
EOSINOPHIL NFR BLD AUTO: 0 % — SIGNIFICANT CHANGE UP (ref 0–6)
GAS PNL BLDV: SIGNIFICANT CHANGE UP
GIANT PLATELETS BLD QL SMEAR: PRESENT — SIGNIFICANT CHANGE UP
GLUCOSE SERPL-MCNC: 105 MG/DL — HIGH (ref 70–99)
GLUCOSE SERPL-MCNC: 91 MG/DL — SIGNIFICANT CHANGE UP (ref 70–99)
HCT VFR BLD CALC: 33.1 % — LOW (ref 34.5–45)
HGB BLD-MCNC: 10.4 G/DL — LOW (ref 11.5–15.5)
IANC: 41.39 K/UL — HIGH (ref 1.8–7.4)
LYMPHOCYTES # BLD AUTO: 2.19 K/UL — SIGNIFICANT CHANGE UP (ref 1–3.3)
LYMPHOCYTES # BLD AUTO: 4.4 % — LOW (ref 13–44)
MACROCYTES BLD QL: SLIGHT — SIGNIFICANT CHANGE UP
MAGNESIUM SERPL-MCNC: 2.3 MG/DL — SIGNIFICANT CHANGE UP (ref 1.6–2.6)
MCHC RBC-ENTMCNC: 24.4 PG — LOW (ref 27–34)
MCHC RBC-ENTMCNC: 31.4 GM/DL — LOW (ref 32–36)
MCV RBC AUTO: 77.5 FL — LOW (ref 80–100)
MONOCYTES # BLD AUTO: 0.45 K/UL — SIGNIFICANT CHANGE UP (ref 0–0.9)
MONOCYTES NFR BLD AUTO: 0.9 % — LOW (ref 2–14)
MYELOCYTES NFR BLD: 6.1 % — HIGH (ref 0–0)
NEUTROPHILS # BLD AUTO: 44.07 K/UL — HIGH (ref 1.8–7.4)
NEUTROPHILS NFR BLD AUTO: 87.7 % — HIGH (ref 43–77)
NEUTS BAND # BLD: 0.9 % — SIGNIFICANT CHANGE UP (ref 0–6)
NRBC # BLD: 2 /100 WBCS — HIGH (ref 0–0)
NT-PROBNP SERPL-SCNC: HIGH PG/ML
PLAT MORPH BLD: NORMAL — SIGNIFICANT CHANGE UP
PLATELET # BLD AUTO: 188 K/UL — SIGNIFICANT CHANGE UP (ref 150–400)
PLATELET COUNT - ESTIMATE: NORMAL — SIGNIFICANT CHANGE UP
POIKILOCYTOSIS BLD QL AUTO: SIGNIFICANT CHANGE UP
POLYCHROMASIA BLD QL SMEAR: SLIGHT — SIGNIFICANT CHANGE UP
POTASSIUM SERPL-MCNC: 4 MMOL/L — SIGNIFICANT CHANGE UP (ref 3.5–5.3)
POTASSIUM SERPL-MCNC: 4.9 MMOL/L — SIGNIFICANT CHANGE UP (ref 3.5–5.3)
POTASSIUM SERPL-SCNC: 4 MMOL/L — SIGNIFICANT CHANGE UP (ref 3.5–5.3)
POTASSIUM SERPL-SCNC: 4.9 MMOL/L — SIGNIFICANT CHANGE UP (ref 3.5–5.3)
PROT SERPL-MCNC: 5.9 G/DL — LOW (ref 6–8.3)
PROT SERPL-MCNC: 6.9 G/DL — SIGNIFICANT CHANGE UP (ref 6–8.3)
RBC # BLD: 4.27 M/UL — SIGNIFICANT CHANGE UP (ref 3.8–5.2)
RBC # FLD: 24.2 % — HIGH (ref 10.3–14.5)
RBC BLD AUTO: ABNORMAL
SODIUM SERPL-SCNC: 134 MMOL/L — LOW (ref 135–145)
SODIUM SERPL-SCNC: 136 MMOL/L — SIGNIFICANT CHANGE UP (ref 135–145)
TARGETS BLD QL SMEAR: SLIGHT — SIGNIFICANT CHANGE UP
TROPONIN T, HIGH SENSITIVITY RESULT: 69 NG/L — CRITICAL HIGH
TROPONIN T, HIGH SENSITIVITY RESULT: 78 NG/L — CRITICAL HIGH
WBC # BLD: 49.74 K/UL — CRITICAL HIGH (ref 3.8–10.5)
WBC # FLD AUTO: 49.74 K/UL — CRITICAL HIGH (ref 3.8–10.5)

## 2024-10-28 PROCEDURE — 99291 CRITICAL CARE FIRST HOUR: CPT

## 2024-10-28 PROCEDURE — 76700 US EXAM ABDOM COMPLETE: CPT | Mod: 26

## 2024-10-28 PROCEDURE — 71045 X-RAY EXAM CHEST 1 VIEW: CPT | Mod: 26

## 2024-10-28 PROCEDURE — 99292 CRITICAL CARE ADDL 30 MIN: CPT

## 2024-10-28 PROCEDURE — 93971 EXTREMITY STUDY: CPT | Mod: 26,LT

## 2024-10-28 RX ORDER — METOPROLOL TARTRATE 50 MG
5 TABLET ORAL ONCE
Refills: 0 | Status: COMPLETED | OUTPATIENT
Start: 2024-10-28 | End: 2024-10-28

## 2024-10-28 RX ORDER — METOPROLOL TARTRATE 50 MG
25 TABLET ORAL ONCE
Refills: 0 | Status: COMPLETED | OUTPATIENT
Start: 2024-10-28 | End: 2024-10-28

## 2024-10-28 RX ORDER — FUROSEMIDE 40 MG
40 TABLET ORAL ONCE
Refills: 0 | Status: COMPLETED | OUTPATIENT
Start: 2024-10-28 | End: 2024-10-28

## 2024-10-28 RX ORDER — SODIUM BICARBONATE 1 MEQ/ML
50 VIAL (ML) INTRAVENOUS ONCE
Refills: 0 | Status: DISCONTINUED | OUTPATIENT
Start: 2024-10-28 | End: 2024-10-28

## 2024-10-28 RX ORDER — SODIUM BICARBONATE 1 MEQ/ML
50 VIAL (ML) INTRAVENOUS ONCE
Refills: 0 | Status: COMPLETED | OUTPATIENT
Start: 2024-10-28 | End: 2024-10-28

## 2024-10-28 RX ORDER — PIPERACILLIN AND TAZOBACTAM .5; 4 G/20ML; G/20ML
3.38 INJECTION, POWDER, LYOPHILIZED, FOR SOLUTION INTRAVENOUS ONCE
Refills: 0 | Status: COMPLETED | OUTPATIENT
Start: 2024-10-28 | End: 2024-10-28

## 2024-10-28 RX ORDER — FUROSEMIDE 40 MG
40 TABLET ORAL ONCE
Refills: 0 | Status: COMPLETED | OUTPATIENT
Start: 2024-10-28 | End: 2024-10-29

## 2024-10-28 RX ADMIN — Medication 50 MILLIEQUIVALENT(S): at 19:52

## 2024-10-28 RX ADMIN — Medication 25 MILLIGRAM(S): at 19:51

## 2024-10-28 RX ADMIN — PIPERACILLIN AND TAZOBACTAM 200 GRAM(S): .5; 4 INJECTION, POWDER, LYOPHILIZED, FOR SOLUTION INTRAVENOUS at 19:39

## 2024-10-28 RX ADMIN — Medication 40 MILLIGRAM(S): at 17:42

## 2024-10-28 RX ADMIN — Medication 5 MILLIGRAM(S): at 23:24

## 2024-10-28 RX ADMIN — Medication 5 MILLIGRAM(S): at 17:42

## 2024-10-28 RX ADMIN — Medication 5 MILLIGRAM(S): at 19:25

## 2024-10-28 NOTE — CONSULT NOTE ADULT - SUBJECTIVE AND OBJECTIVE BOX
[facility] DIVISION OF KIDNEY DISEASES AND HYPERTENSION -- [phone]  -- INITIAL CONSULT NOTE  --------------------------------------------------------------------------------  HPI: 85 yo F with a PMH of CKD, CHF, HTN, Essential Thrombocytosis who presented to [facility] due to ***. Nephrology consulted for EDA on CKD.            PAST HISTORY  --------------------------------------------------------------------------------  PAST MEDICAL & SURGICAL HISTORY:  Thrombocytosis      Hypertension      Hyperlipidemia        FAMILY HISTORY:    PAST SOCIAL HISTORY:    ALLERGIES & MEDICATIONS  --------------------------------------------------------------------------------  Allergies    No Known Allergies    Intolerances      Standing Inpatient Medications    PRN Inpatient Medications      REVIEW OF SYSTEMS  --------------------------------------------------------------------------------  Gen: No fevers/chills  Head/Eyes/Ears: No HA  Respiratory: No dyspnea, cough  CV: No chest pain  GI: No abdominal pain, diarrhea  : No dysuria, hematuria  MSK: No  edema  Skin: No rashes  Heme: No easy bruising or bleeding    All other systems were reviewed and are negative, except as noted.    VITALS/PHYSICAL EXAM  --------------------------------------------------------------------------------  T(C): 37 (10-28-24 @ 19:31), Max: 37.5 (10-28-24 @ 16:30)  HR: 110 (10-28-24 @ 19:31) (76 - 122)  BP: 100/58 (10-28-24 @ 19:31) (100/58 - 142/74)  RR: 32 (10-28-24 @ 19:31) (17 - 32)  SpO2: 97% (10-28-24 @ 19:31) (96% - 98%)  Wt(kg): --  Height (cm): 149.9 (10-28-24 @ 14:44)  Weight (kg): 57.2 (10-28-24 @ 14:44)  BMI (kg/m2): 25.5 (10-28-24 @ 14:44)  BSA (m2): 1.52 (10-28-24 @ 14:44)    Physical Exam:  	Gen:  	HEENT: Anicteric  	Pulm: CTA B/L  	CV: S1S2+  	Abd: Soft, +BS    	Ext: ** LE edema B/L  	Neuro: Awake          : Owusu+ with clear urine in the bag  	Skin: Warm and dry    LABS/STUDIES  --------------------------------------------------------------------------------              10.4   49.74 >-----------<  188      [10-28-24 @ 17:24]              33.1     134  |  98  |  94  ----------------------------<  91      [10-28-24 @ 17:24]  4.9   |  9   |  4.33        Ca     8.7     [10-28-24 @ 17:24]      Mg     2.30     [10-28-24 @ 17:24]    TPro  6.9  /  Alb  3.7  /  TBili  2.2  /  DBili  x   /  AST  1646  /  ALT  780  /  AlkPhos  254  [10-28-24 @ 17:24]    Creatinine Trend:  SCr 4.33 [10-28 @ 17:24]   [facility] DIVISION OF KIDNEY DISEASES AND HYPERTENSION -- [phone]  -- INITIAL CONSULT NOTE  --------------------------------------------------------------------------------  HPI: 83 yo F with a PMH of CKD, CHF, HTN, Essential Thrombocytosis who presented to [facility] due to ***. Nephrology consulted for EDA on CKD.    Per [facility] review, pt's most recent SCr prior to admission was 2.43 on 10/14/24.     Pt seen and examined in the ED. ***      PAST HISTORY  --------------------------------------------------------------------------------  PAST MEDICAL & SURGICAL HISTORY:  Thrombocytosis  Hypertension  Hyperlipidemia    FAMILY HISTORY:    PAST SOCIAL HISTORY:    ALLERGIES & MEDICATIONS  --------------------------------------------------------------------------------  Allergies  No Known Allergies    Intolerances    Standing Inpatient Medications    PRN Inpatient Medications    REVIEW OF SYSTEMS  --------------------------------------------------------------------------------  Gen: No fevers/chills  Head/Eyes/Ears: No HA  Respiratory: No dyspnea, cough  CV: No chest pain  GI: No abdominal pain, diarrhea  : No dysuria, hematuria  MSK: ***  Skin: No rashes  Heme: No easy bruising or bleeding    All other systems were reviewed and are negative, except as noted.    VITALS/PHYSICAL EXAM  --------------------------------------------------------------------------------  T(C): 37 (10-28-24 @ 19:31), Max: 37.5 (10-28-24 @ 16:30)  HR: 110 (10-28-24 @ 19:31) (76 - 122)  BP: 100/58 (10-28-24 @ 19:31) (100/58 - 142/74)  RR: 32 (10-28-24 @ 19:31) (17 - 32)  SpO2: 97% (10-28-24 @ 19:31) (96% - 98%)  Wt(kg): --  Height (cm): 149.9 (10-28-24 @ 14:44)  Weight (kg): 57.2 (10-28-24 @ 14:44)  BMI (kg/m2): 25.5 (10-28-24 @ 14:44)  BSA (m2): 1.52 (10-28-24 @ 14:44)    Physical Exam:  	Gen:  	HEENT: Anicteric  	Pulm: CTA B/L  	CV: S1S2+  	Abd: Soft, +BS    	Ext: ** LE edema B/L  	Neuro: Awake          : Owusu+ with clear urine in the bag  	Skin: Warm and dry    LABS/STUDIES  --------------------------------------------------------------------------------              10.4   49.74 >-----------<  188      [10-28-24 @ 17:24]              33.1     134  |  98  |  94  ----------------------------<  91      [10-28-24 @ 17:24]  4.9   |  9   |  4.33        Ca     8.7     [10-28-24 @ 17:24]      Mg     2.30     [10-28-24 @ 17:24]    TPro  6.9  /  Alb  3.7  /  TBili  2.2  /  DBili  x   /  AST  1646  /  ALT  780  /  AlkPhos  254  [10-28-24 @ 17:24]    Creatinine Trend:  SCr 4.33 [10-28 @ 17:24]   [facility] DIVISION OF KIDNEY DISEASES AND HYPERTENSION -- [phone]  -- INITIAL CONSULT NOTE  --------------------------------------------------------------------------------  HPI: 85 yo F with a PMH of CKD, CHF, HTN, Essential Thrombocytosis who presented to [facility] due to shortness of breath. Nephrology consulted for EDA on CKD.    Per [facility] review, pt's most recent SCr prior to admission was 2.43 on 10/14/24. Appears baseline has been in the mid 2s since August.     Pt seen and examined in the ED, no family present and could not reach them via telephone. Pt stated she has had occasional shortness of breath otherwise could not offer significant history. Per discussion with ED, pt was being seen by cardiology for sob and LE edema and advised her to go to the hospital. No overt recent nausea, vomiting, diarrhea or changes in medications reported. Pt is on Entresto, Furosemide and Spironolactone in setting of HF. Per ED, pt does not follow with a nephrologist.     PAST HISTORY  --------------------------------------------------------------------------------  PAST MEDICAL & SURGICAL HISTORY:  Thrombocytosis  Hypertension  Hyperlipidemia    FAMILY HISTORY:    PAST SOCIAL HISTORY:    ALLERGIES & MEDICATIONS  --------------------------------------------------------------------------------  Allergies  No Known Allergies    Intolerances    Standing Inpatient Medications    PRN Inpatient Medications    REVIEW OF SYSTEMS  --------------------------------------------------------------------------------  Limited ROS, see HPI    VITALS/PHYSICAL EXAM  --------------------------------------------------------------------------------  T(C): 37 (10-28-24 @ 19:31), Max: 37.5 (10-28-24 @ 16:30)  HR: 110 (10-28-24 @ 19:31) (76 - 122)  BP: 100/58 (10-28-24 @ 19:31) (100/58 - 142/74)  RR: 32 (10-28-24 @ 19:31) (17 - 32)  SpO2: 97% (10-28-24 @ 19:31) (96% - 98%)  Wt(kg): --  Height (cm): 149.9 (10-28-24 @ 14:44)  Weight (kg): 57.2 (10-28-24 @ 14:44)  BMI (kg/m2): 25.5 (10-28-24 @ 14:44)  BSA (m2): 1.52 (10-28-24 @ 14:44)    Physical Exam:  	Gen: Chronically ill appearing but in NAD  	HEENT: Anicteric  	Pulm: Diminished B/L  	CV: S1S2+  	Abd: Soft, +BS    	Ext: + LE edema B/L (L>R)  	Neuro: Awake, AAO x1-2  	Skin: Warm and dry    LABS/STUDIES  --------------------------------------------------------------------------------              10.4   49.74 >-----------<  188      [10-28-24 @ 17:24]              33.1     134  |  98  |  94  ----------------------------<  91      [10-28-24 @ 17:24]  4.9   |  9   |  4.33        Ca     8.7     [10-28-24 @ 17:24]      Mg     2.30     [10-28-24 @ 17:24]    TPro  6.9  /  Alb  3.7  /  TBili  2.2  /  DBili  x   /  AST  1646  /  ALT  780  /  AlkPhos  254  [10-28-24 @ 17:24]    Creatinine Trend:  SCr 4.33 [10-28 @ 17:24]   [facility] DIVISION OF KIDNEY DISEASES AND HYPERTENSION -- [phone]  -- INITIAL CONSULT NOTE  --------------------------------------------------------------------------------  HPI: 85 yo F with a PMH of CKD, CHF, HTN, Essential Thrombocytosis who presented to [facility] due to shortness of breath. Nephrology consulted for EDA on CKD.    Per [facility] review, pt's most recent SCr prior to admission was 2.43 on 10/14/24. Appears baseline has been in the mid 2s since August.     Pt seen and examined in the ED, no family present and could not reach them via telephone. Pt stated she has had occasional shortness of breath otherwise could not offer significant history. Per discussion with ED, pt was being seen by cardiology for sob and LE edema and advised her to go to the hospital. No overt recent nausea, vomiting, diarrhea or changes in medications reported. Pt is on Entresto, Furosemide and Spironolactone in setting of HF. Per ED, pt does not follow with a nephrologist.     PAST HISTORY  --------------------------------------------------------------------------------  PAST MEDICAL & SURGICAL HISTORY:  Thrombocytosis  Hypertension  Hyperlipidemia    FAMILY HISTORY:    PAST SOCIAL HISTORY:    ALLERGIES & MEDICATIONS  --------------------------------------------------------------------------------  Allergies  No Known Allergies    Intolerances    Standing Inpatient Medications    PRN Inpatient Medications    REVIEW OF SYSTEMS  --------------------------------------------------------------------------------  Limited ROS, see HPI    VITALS/PHYSICAL EXAM  --------------------------------------------------------------------------------  T(C): 37 (10-28-24 @ 19:31), Max: 37.5 (10-28-24 @ 16:30)  HR: 110 (10-28-24 @ 19:31) (76 - 122)  BP: 100/58 (10-28-24 @ 19:31) (100/58 - 142/74)  RR: 32 (10-28-24 @ 19:31) (17 - 32)  SpO2: 97% (10-28-24 @ 19:31) (96% - 98%)  Wt(kg): --  Height (cm): 149.9 (10-28-24 @ 14:44)  Weight (kg): 57.2 (10-28-24 @ 14:44)  BMI (kg/m2): 25.5 (10-28-24 @ 14:44)  BSA (m2): 1.52 (10-28-24 @ 14:44)    Physical Exam:  	Gen: Chronically ill appearing but in NAD  	HEENT: Anicteric  	Pulm: Diminished B/L  	CV: S1S2+  	Abd: Soft, +BS    	Ext: + LE edema B/L (L>R)  	Neuro: Awake, AAO x1-2  	Skin: Warm and dry    LABS/STUDIES  --------------------------------------------------------------------------------              10.4   49.74 >-----------<  188      [10-28-24 @ 17:24]              33.1     134  |  98  |  94  ----------------------------<  91      [10-28-24 @ 17:24]  4.9   |  9   |  4.33        Ca     8.7     [10-28-24 @ 17:24]      Mg     2.30     [10-28-24 @ 17:24]    TPro  6.9  /  Alb  3.7  /  TBili  2.2  /  DBili  x   /  AST  1646  /  ALT  780  /  AlkPhos  254  [10-28-24 @ 17:24]    Creatinine Trend:  SCr 4.33 [10-28 @ 17:24]

## 2024-10-28 NOTE — ED ADULT NURSE NOTE - OBJECTIVE STATEMENT
Pt A&Ox4 to ed c/o increased weakness x 3 days, loss of appetite and lower extremity edema x couple of weeks. Pt endorses sob with activity. spo2 within normal limits currently. pt states, " I have difficulty with walking due to swelling."  Hx atrial fib, left-sided heart failure, thrombocytosis and HTN. Pt denies chest pain, headache, dizziness, n/v/d. breathing even and unlabored. pt on cardiac monitor, NSR/Afib. lower leg edema bilaterally. no acute distress noted at this time. 20g IV R forearm. labs drawn and sent. plan of care continued.

## 2024-10-28 NOTE — ED ADULT TRIAGE NOTE - CHIEF COMPLAINT QUOTE
Pt coming to ER sent by cardiologist office for possible failure to thrive. Pt has become more weak and loss of appetite x3 days. Pt states she has also been experiencing worsening SOB. Hx atrial fib, thrombocytosis and HTN. Pt denies chest pain, headache, dizziness. States she is feeling lightheaded. Finger stick 96

## 2024-10-28 NOTE — CONSULT NOTE ADULT - ATTENDING COMMENTS
Pt. with EDA on CKD with metabolic acidosis in the setting of fluid overload, ARB use, and decreased PO intake. Pt. was seen and evaluated by me on 10/29/24. Pt. lethargic, non-verbal and has BL LE edema on exam. On review of [place]/[facility], SCr noted to be elevated since July 2022. SCr was 2.43 on 10/14/24. SCr initially during this admission increased to 4.33 (10/28). Pt. received IV Lasix. SCr remains elevated/stable at 4.31 today (10/29). pH improved to 7.34, and SCO2 remains low but improved to 16 with diuresis. UOP is not documented but clear urine noted in urinary catheter at the time of evaluation. CT abdomen showed atrophic R kidney but no evidence of hydronephrosis BL. Labs reviewed. No urgent indications for HD currently but will continue to monitor. Agree with gentle IV fluid hydration given elevated serum uric acid and lactate levels. Would prefer IV sodium bicarbonate infusion (150 meq sodium bicarbonate in 1L D5W at 100 cc/hr). Obtain echocardiogram. Repeat BMP in 4-6 hours. Monitor labs and urine output. Avoid nephrotoxins. Dose medications as per eGFR.     If you have any questions, please feel free to contact me  Jeny Bronson  Nephrology  [phone] / [employer] Teams(Preferred)  (After 4 pm or on weekends please page the on-call fellow)

## 2024-10-28 NOTE — CHART NOTE - NSCHARTNOTEFT_GEN_A_CORE
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 Waller on cytoreductive therapy w/ anagrelide who presents with shortness of breath and edema. Hematology consulted for hx of MPN in the setting of leukocytosis of 49K. Differential mainly neutrophilic, no reported blast on differential. Elevated LFTs and pro-BNP along with EDA on CKD, and elevated lactate on labs.       Recommendations:  -Suspect leukocytosis most likely reactive from acute illness.   -Recommend infectious w/u (blood cultures, CXR, UA, urine culture) and abx per primary team given elevated lactate and WBC count. Would cover for intra-abdominal source given elevated LFTs.   -Can hold anagrelide while acute process being worked up.   -Agree with nephro eval for EDA.     Plan d/w Dr. Pate.     Lowell Valle M.D.  H/O PGY-5

## 2024-10-28 NOTE — CONSULT NOTE ADULT - PROBLEM SELECTOR RECOMMENDATION 9
Pt with EDA on CKD in setting of ***. Exact duration of EDA however unknown. Send UA, urine electrolytes, spot urine TP/CR. Send serological work-up for secondary causes of renal failure including VALENTÍN, P-ANCA, C-ANCA, Anti-GBM ab, HBsAg, Hep C antibody, HIV, Parvovirus, C3, C4, serum and urine immunofixation. Recommend Owusu catheter placement. Agree with IVF NS for now.  Will need to consider HD if renal failure continues to worsen. Monitor labs and urine output. Avoid NSAIDs, ACEI/ARBS, RCA and nephrotoxins. Dose medications as per eGFR. Pt with EDA on CKD in setting of HF exacerbation, entresto use v other. Pt also w/markedly elevated WBCs, LFTs. SCr elevated to 4.3. Per [facility] review, pt's most recent SCr prior to admission was 2.43 on 10/14/24. Appears baseline has been in the mid 2s since August. Recommend UA, UPCR, LDH, Haptoglobin, Uric Acid. Abdominal US demonstrated atrophic R kidney, no hydronephrosis. Pt received diuretics in the ED for volume overload on exam with marked LE edema. Recommend CXR, ECHO. May need to consider renal replacement therapy if kidney failure worsens or pt develops significant electrolyte abnormalities but no urgent HD needs at present. Monitor labs and urine output. Avoid NSAIDs, ACEI/ARBS (hold Entresto), RCA and nephrotoxins. Dose medications as per eGFR.

## 2024-10-28 NOTE — ED PROVIDER NOTE - CARE PLAN
1 Principal Discharge DX:	Atrial fibrillation with RVR  Secondary Diagnosis:	Acute kidney injury superimposed on CKD  Secondary Diagnosis:	Metabolic acidosis

## 2024-10-28 NOTE — ED ADULT NURSE NOTE - NSFALLRISKINTERV_ED_ALL_ED

## 2024-10-28 NOTE — ED PROVIDER NOTE - ATTENDING CONTRIBUTION TO CARE
83yo F ho afib on eliquis, CHF on lasix entresto, htn, thrombocytosis, and metoprolol, pw shortness of breath leg swelling, concern for heart failure and ftt  pt in rapid afib, bl le edema, crackles in lung  will diurese, treat aifb labs, TBA 85yo F ho afib on eliquis, CHF on lasix entresto, htn, thrombocytosis, and metoprolol, pw shortness of breath leg swelling, concern for heart failure and ftt  pt in rapid afib, bl le edema, crackles in lung  will diurese, treat aifb labs, TBA    labs significant for leukocytosis 49, ARK, uremia, acidosis, bicarb of 9, lactate of 5, transamintis  heme onc called for possible conversion to leukemia (has Jak2 thrombocytotsis) vs congestive heeart fauilure and hepatopahty  heme onc consulted, nephro consulted, no indication for HD at this time  labs improving with diuresis and bicarb  will admit    Upon my evaluation, this patient had a high probability of imminent or life-threatening deterioration due to _arf, metabolic acidosis, lactaic acidosis________, which required my direct attention, intervention, and personal management.  The patient has a  medical condition that impairs one or more vital organ systems.  Frequent personal assessment and adjustment of medical interventions was performed.      I have personally provided 120___ minutes of critical care time exclusive of time spent on separately billable procedures. Time includes review of laboratory data, radiology results, discussion with consultants, patient and family; monitoring for potential decompensation, as well as time spent retrieving data and reviewing the chart and documenting the visit. Interventions were performed as documented above.

## 2024-10-28 NOTE — ED PROVIDER NOTE - CLINICAL SUMMARY MEDICAL DECISION MAKING FREE TEXT BOX
84-year-old female history of A-fib on Eliquis, CHF on Lasix and Entresto, hypotension, thrombocythemia, presents from cardiologist office due to concern for fluid overload.  Patient endorsing progressively worsening dyspnea on exertion the past week, denies chest pain.  Also endorsing increased lower extremity swelling.  Denies fevers, abdominal pain, nausea, vomiting, urinary symptoms.  Patient afebrile, found to be in A-fib RVR, not hypoxic, not hypotensive, crackles bilaterally, bilateral lower extremity swelling left worse than right.  Concern for CHF exacerbation, likely leading to patient's rapid A-fib.  Will obtain labs, imaging, diuretics, rate control, to be admitted.

## 2024-10-28 NOTE — CONSULT NOTE ADULT - PROBLEM SELECTOR RECOMMENDATION 2
Pt with metabolic acidosis in setting of EDA on CKD and lactic acidosis. Unclear etiology of either at present. SCO2 9 with pH of 7.23 and lactate of 5 on admission. Repeat pH 7.32 and lactate 4.5 s/p diuretics. Pt not overtly hypoxic but possible lactate elevation due to volume overload. BP in the low 100s. Recommend repeat BMP. Monitor SCO2 and pH.    Assessment and plan discussed with attending on call. Pt with metabolic acidosis in setting of EDA on CKD and lactic acidosis. Unclear etiology of either at present. SCO2 9 with pH of 7.23 and lactate of 5 on admission. Repeat pH 7.32 and lactate 4.5 s/p diuretics. Pt not overtly hypoxic but possible lactate elevation due to volume overload. BP in the low 100s. Recommend repeat BMP. If SCO2/pH remain persistently low, could start PO Bicarb 1300mg TID. Monitor SCO2 and pH.    Assessment and plan discussed with attending on call.

## 2024-10-28 NOTE — ED PROVIDER NOTE - PHYSICAL EXAMINATION
GEN: NAD. A&Ox2. Non-toxic appearing.  HEENT: normocephalic, atraumatic, EOMI, PERRL, no scleral icterus, no conjuntival pallor, moist MM  CARDIAC: tachycardic, irregular rhythm, S1, S2, no murmur. Radial pulses and DP pulses present and symmetric b/l  PULM: crackles b/l  ABD: soft NT, ND, no rebound no guarding   MSK: Moving all extremities, 2+ pitting edema LLE, 1+ RLE  NEURO: no focal neurological deficits, CN 2-12 grossly intact  SKIN: warm, dry, no rash.

## 2024-10-29 ENCOUNTER — RESULT REVIEW (OUTPATIENT)
Age: 84
End: 2024-10-29

## 2024-10-29 DIAGNOSIS — Z51.5 ENCOUNTER FOR PALLIATIVE CARE: ICD-10-CM

## 2024-10-29 DIAGNOSIS — I48.91 UNSPECIFIED ATRIAL FIBRILLATION: ICD-10-CM

## 2024-10-29 DIAGNOSIS — Z71.89 OTHER SPECIFIED COUNSELING: ICD-10-CM

## 2024-10-29 DIAGNOSIS — D72.829 ELEVATED WHITE BLOOD CELL COUNT, UNSPECIFIED: ICD-10-CM

## 2024-10-29 DIAGNOSIS — R53.81 OTHER MALAISE: ICD-10-CM

## 2024-10-29 DIAGNOSIS — Z29.9 ENCOUNTER FOR PROPHYLACTIC MEASURES, UNSPECIFIED: ICD-10-CM

## 2024-10-29 DIAGNOSIS — R41.82 ALTERED MENTAL STATUS, UNSPECIFIED: ICD-10-CM

## 2024-10-29 DIAGNOSIS — I50.9 HEART FAILURE, UNSPECIFIED: ICD-10-CM

## 2024-10-29 DIAGNOSIS — D68.9 COAGULATION DEFECT, UNSPECIFIED: ICD-10-CM

## 2024-10-29 DIAGNOSIS — R74.01 ELEVATION OF LEVELS OF LIVER TRANSAMINASE LEVELS: ICD-10-CM

## 2024-10-29 LAB
ADD ON TEST-SPECIMEN IN LAB: SIGNIFICANT CHANGE UP
ADD ON TEST-SPECIMEN IN LAB: SIGNIFICANT CHANGE UP
ALBUMIN SERPL ELPH-MCNC: 2.7 G/DL — LOW (ref 3.3–5)
ALBUMIN SERPL ELPH-MCNC: 3 G/DL — LOW (ref 3.3–5)
ALP SERPL-CCNC: 163 U/L — HIGH (ref 40–120)
ALP SERPL-CCNC: 189 U/L — HIGH (ref 40–120)
ALT FLD-CCNC: 364 U/L — HIGH (ref 4–33)
ALT FLD-CCNC: 492 U/L — HIGH (ref 4–33)
AMPHET UR-MCNC: NEGATIVE — SIGNIFICANT CHANGE UP
ANION GAP SERPL CALC-SCNC: 22 MMOL/L — HIGH (ref 7–14)
ANION GAP SERPL CALC-SCNC: 22 MMOL/L — HIGH (ref 7–14)
ANISOCYTOSIS BLD QL: SIGNIFICANT CHANGE UP
APAP SERPL-MCNC: <10 UG/ML — LOW (ref 15–25)
APPEARANCE UR: CLEAR — SIGNIFICANT CHANGE UP
APTT BLD: 31.9 SEC — SIGNIFICANT CHANGE UP (ref 24.5–35.6)
APTT BLD: 32.6 SEC — SIGNIFICANT CHANGE UP (ref 24.5–35.6)
APTT BLD: 36.2 SEC — HIGH (ref 24.5–35.6)
AST SERPL-CCNC: 313 U/L — HIGH (ref 4–32)
AST SERPL-CCNC: 615 U/L — HIGH (ref 4–32)
BACTERIA # UR AUTO: NEGATIVE /HPF — SIGNIFICANT CHANGE UP
BARBITURATES UR SCN-MCNC: NEGATIVE — SIGNIFICANT CHANGE UP
BASOPHILS # BLD AUTO: 0 K/UL — SIGNIFICANT CHANGE UP (ref 0–0.2)
BASOPHILS # BLD AUTO: 0.76 K/UL — HIGH (ref 0–0.2)
BASOPHILS NFR BLD AUTO: 0 % — SIGNIFICANT CHANGE UP (ref 0–2)
BASOPHILS NFR BLD AUTO: 1.6 % — SIGNIFICANT CHANGE UP (ref 0–2)
BENZODIAZ UR-MCNC: NEGATIVE — SIGNIFICANT CHANGE UP
BILIRUB SERPL-MCNC: 1.8 MG/DL — HIGH (ref 0.2–1.2)
BILIRUB SERPL-MCNC: 2 MG/DL — HIGH (ref 0.2–1.2)
BILIRUB UR-MCNC: NEGATIVE — SIGNIFICANT CHANGE UP
BLD GP AB SCN SERPL QL: NEGATIVE — SIGNIFICANT CHANGE UP
BLOOD GAS VENOUS COMPREHENSIVE RESULT: SIGNIFICANT CHANGE UP
BUN SERPL-MCNC: 106 MG/DL — HIGH (ref 7–23)
BUN SERPL-MCNC: 110 MG/DL — HIGH (ref 7–23)
CALCIUM SERPL-MCNC: 8.2 MG/DL — LOW (ref 8.4–10.5)
CALCIUM SERPL-MCNC: 8.2 MG/DL — LOW (ref 8.4–10.5)
CAST: 24 /LPF — HIGH (ref 0–4)
CHLORIDE SERPL-SCNC: 100 MMOL/L — SIGNIFICANT CHANGE UP (ref 98–107)
CHLORIDE SERPL-SCNC: 99 MMOL/L — SIGNIFICANT CHANGE UP (ref 98–107)
CK SERPL-CCNC: 113 U/L — SIGNIFICANT CHANGE UP (ref 25–170)
CO2 SERPL-SCNC: 16 MMOL/L — LOW (ref 22–31)
CO2 SERPL-SCNC: 17 MMOL/L — LOW (ref 22–31)
COCAINE METAB.OTHER UR-MCNC: NEGATIVE — SIGNIFICANT CHANGE UP
COLOR SPEC: YELLOW — SIGNIFICANT CHANGE UP
CREAT ?TM UR-MCNC: 49 MG/DL — SIGNIFICANT CHANGE UP
CREAT SERPL-MCNC: 4.31 MG/DL — HIGH (ref 0.5–1.3)
CREAT SERPL-MCNC: 4.39 MG/DL — HIGH (ref 0.5–1.3)
CREATININE URINE RESULT, DAU: 48 MG/DL — SIGNIFICANT CHANGE UP
DACRYOCYTES BLD QL SMEAR: SLIGHT — SIGNIFICANT CHANGE UP
DIFF PNL FLD: NEGATIVE — SIGNIFICANT CHANGE UP
EGFR: 10 ML/MIN/1.73M2 — LOW
EGFR: 9 ML/MIN/1.73M2 — LOW
ELLIPTOCYTES BLD QL SMEAR: SIGNIFICANT CHANGE UP
EOSINOPHIL # BLD AUTO: 0 K/UL — SIGNIFICANT CHANGE UP (ref 0–0.5)
EOSINOPHIL # BLD AUTO: 0.02 K/UL — SIGNIFICANT CHANGE UP (ref 0–0.5)
EOSINOPHIL NFR BLD AUTO: 0 % — SIGNIFICANT CHANGE UP (ref 0–6)
EOSINOPHIL NFR BLD AUTO: 0 % — SIGNIFICANT CHANGE UP (ref 0–6)
ETHANOL SERPL-MCNC: <10 MG/DL — SIGNIFICANT CHANGE UP
FENTANYL UR QL SCN: NEGATIVE — SIGNIFICANT CHANGE UP
GAS PNL BLDV: SIGNIFICANT CHANGE UP
GIANT PLATELETS BLD QL SMEAR: PRESENT — SIGNIFICANT CHANGE UP
GLUCOSE SERPL-MCNC: 110 MG/DL — HIGH (ref 70–99)
GLUCOSE SERPL-MCNC: 135 MG/DL — HIGH (ref 70–99)
GLUCOSE UR QL: NEGATIVE MG/DL — SIGNIFICANT CHANGE UP
HAPTOGLOB SERPL-MCNC: 123 MG/DL — SIGNIFICANT CHANGE UP (ref 34–200)
HCT VFR BLD CALC: 26.1 % — LOW (ref 34.5–45)
HCT VFR BLD CALC: 28.2 % — LOW (ref 34.5–45)
HCT VFR BLD CALC: 28.6 % — LOW (ref 34.5–45)
HCV AB S/CO SERPL IA: 0.29 S/CO — SIGNIFICANT CHANGE UP (ref 0–0.99)
HCV AB SERPL-IMP: SIGNIFICANT CHANGE UP
HGB BLD-MCNC: 8.2 G/DL — LOW (ref 11.5–15.5)
HGB BLD-MCNC: 8.7 G/DL — LOW (ref 11.5–15.5)
HGB BLD-MCNC: 8.7 G/DL — LOW (ref 11.5–15.5)
HYPOCHROMIA BLD QL: SIGNIFICANT CHANGE UP
IANC: 35.33 K/UL — HIGH (ref 1.8–7.4)
IANC: 38.38 K/UL — HIGH (ref 1.8–7.4)
IMM GRANULOCYTES NFR BLD AUTO: 9.3 % — HIGH (ref 0–0.9)
INR BLD: 10.5 RATIO — SIGNIFICANT CHANGE UP (ref 0.85–1.16)
INR BLD: 10.62 RATIO — SIGNIFICANT CHANGE UP (ref 0.85–1.16)
INR BLD: 6.71 RATIO — CRITICAL HIGH (ref 0.85–1.16)
KETONES UR-MCNC: ABNORMAL MG/DL
LACTATE SERPL-SCNC: 1.9 MMOL/L — SIGNIFICANT CHANGE UP (ref 0.5–2)
LDH SERPL L TO P-CCNC: 682 U/L — HIGH (ref 135–225)
LDH SERPL L TO P-CCNC: 843 U/L — HIGH (ref 135–225)
LEUKOCYTE ESTERASE UR-ACNC: NEGATIVE — SIGNIFICANT CHANGE UP
LYMPHOCYTES # BLD AUTO: 2.85 K/UL — SIGNIFICANT CHANGE UP (ref 1–3.3)
LYMPHOCYTES # BLD AUTO: 3.2 K/UL — SIGNIFICANT CHANGE UP (ref 1–3.3)
LYMPHOCYTES # BLD AUTO: 6 % — LOW (ref 13–44)
LYMPHOCYTES # BLD AUTO: 7.3 % — LOW (ref 13–44)
MACROCYTES BLD QL: SIGNIFICANT CHANGE UP
MAGNESIUM SERPL-MCNC: 2.1 MG/DL — SIGNIFICANT CHANGE UP (ref 1.6–2.6)
MAGNESIUM SERPL-MCNC: 2.1 MG/DL — SIGNIFICANT CHANGE UP (ref 1.6–2.6)
MCHC RBC-ENTMCNC: 23.8 PG — LOW (ref 27–34)
MCHC RBC-ENTMCNC: 23.8 PG — LOW (ref 27–34)
MCHC RBC-ENTMCNC: 23.9 PG — LOW (ref 27–34)
MCHC RBC-ENTMCNC: 30.4 GM/DL — LOW (ref 32–36)
MCHC RBC-ENTMCNC: 30.9 GM/DL — LOW (ref 32–36)
MCHC RBC-ENTMCNC: 31.4 G/DL — LOW (ref 32–36)
MCV RBC AUTO: 75.7 FL — LOW (ref 80–100)
MCV RBC AUTO: 77.5 FL — LOW (ref 80–100)
MCV RBC AUTO: 78.1 FL — LOW (ref 80–100)
METAMYELOCYTES # FLD: 2.4 % — HIGH (ref 0–1)
METHADONE UR-MCNC: NEGATIVE — SIGNIFICANT CHANGE UP
MONOCYTES # BLD AUTO: 1.05 K/UL — HIGH (ref 0–0.9)
MONOCYTES # BLD AUTO: 1.25 K/UL — HIGH (ref 0–0.9)
MONOCYTES NFR BLD AUTO: 2.4 % — SIGNIFICANT CHANGE UP (ref 2–14)
MONOCYTES NFR BLD AUTO: 2.6 % — SIGNIFICANT CHANGE UP (ref 2–14)
MYELOCYTES NFR BLD: 4.1 % — HIGH (ref 0–0)
NEUTROPHILS # BLD AUTO: 36.73 K/UL — HIGH (ref 1.8–7.4)
NEUTROPHILS # BLD AUTO: 38.38 K/UL — HIGH (ref 1.8–7.4)
NEUTROPHILS NFR BLD AUTO: 80.5 % — HIGH (ref 43–77)
NEUTROPHILS NFR BLD AUTO: 83.8 % — HIGH (ref 43–77)
NITRITE UR-MCNC: NEGATIVE — SIGNIFICANT CHANGE UP
NRBC # BLD: 1 /100 WBCS — HIGH (ref 0–0)
NRBC # BLD: 1 /100 WBCS — HIGH (ref 0–0)
NRBC # FLD: 0.47 K/UL — HIGH (ref 0–0)
NRBC # FLD: 0.66 K/UL — HIGH (ref 0–0)
OPIATES UR-MCNC: NEGATIVE — SIGNIFICANT CHANGE UP
OSMOLALITY UR: 347 MOSM/KG — SIGNIFICANT CHANGE UP (ref 50–1200)
OVALOCYTES BLD QL SMEAR: SIGNIFICANT CHANGE UP
OXYCODONE UR-MCNC: NEGATIVE — SIGNIFICANT CHANGE UP
PCP SPEC-MCNC: SIGNIFICANT CHANGE UP
PCP UR-MCNC: NEGATIVE — SIGNIFICANT CHANGE UP
PH UR: 5 — SIGNIFICANT CHANGE UP (ref 5–8)
PHOSPHATE SERPL-MCNC: 4.1 MG/DL — SIGNIFICANT CHANGE UP (ref 2.5–4.5)
PHOSPHATE SERPL-MCNC: 4.6 MG/DL — HIGH (ref 2.5–4.5)
PLAT MORPH BLD: NORMAL — SIGNIFICANT CHANGE UP
PLATELET # BLD AUTO: 161 K/UL — SIGNIFICANT CHANGE UP (ref 150–400)
PLATELET # BLD AUTO: 209 K/UL — SIGNIFICANT CHANGE UP (ref 150–400)
PLATELET # BLD AUTO: 240 K/UL — SIGNIFICANT CHANGE UP (ref 150–400)
PLATELET COUNT - ESTIMATE: NORMAL — SIGNIFICANT CHANGE UP
POIKILOCYTOSIS BLD QL AUTO: SIGNIFICANT CHANGE UP
POLYCHROMASIA BLD QL SMEAR: SIGNIFICANT CHANGE UP
POTASSIUM SERPL-MCNC: 3.5 MMOL/L — SIGNIFICANT CHANGE UP (ref 3.5–5.3)
POTASSIUM SERPL-MCNC: 4.1 MMOL/L — SIGNIFICANT CHANGE UP (ref 3.5–5.3)
POTASSIUM SERPL-SCNC: 3.5 MMOL/L — SIGNIFICANT CHANGE UP (ref 3.5–5.3)
POTASSIUM SERPL-SCNC: 4.1 MMOL/L — SIGNIFICANT CHANGE UP (ref 3.5–5.3)
POTASSIUM UR-SCNC: 16 MMOL/L — SIGNIFICANT CHANGE UP
PROT ?TM UR-MCNC: 13 MG/DL — SIGNIFICANT CHANGE UP
PROT SERPL-MCNC: 5 G/DL — LOW (ref 6–8.3)
PROT SERPL-MCNC: 5.5 G/DL — LOW (ref 6–8.3)
PROT UR-MCNC: 30 MG/DL
PROT/CREAT UR-RTO: 0.3 RATIO — HIGH (ref 0–0.2)
PROTHROM AB SERPL-ACNC: 120.3 SEC — SIGNIFICANT CHANGE UP (ref 9.9–13.4)
PROTHROM AB SERPL-ACNC: 122.1 SEC — SIGNIFICANT CHANGE UP (ref 9.9–13.4)
PROTHROM AB SERPL-ACNC: 76.4 SEC — HIGH (ref 9.9–13.4)
RBC # BLD: 3.45 M/UL — LOW (ref 3.8–5.2)
RBC # BLD: 3.64 M/UL — LOW (ref 3.8–5.2)
RBC # BLD: 3.66 M/UL — LOW (ref 3.8–5.2)
RBC # FLD: 23.9 % — HIGH (ref 10.3–14.5)
RBC # FLD: 24.2 % — HIGH (ref 10.3–14.5)
RBC # FLD: 24.6 % — HIGH (ref 10.3–14.5)
RBC BLD AUTO: ABNORMAL
RBC CASTS # UR COMP ASSIST: 6 /HPF — HIGH (ref 0–4)
REVIEW: SIGNIFICANT CHANGE UP
RH IG SCN BLD-IMP: POSITIVE — SIGNIFICANT CHANGE UP
RH IG SCN BLD-IMP: POSITIVE — SIGNIFICANT CHANGE UP
SALICYLATES SERPL-MCNC: <0.3 MG/DL — LOW (ref 15–30)
SCHISTOCYTES BLD QL AUTO: SLIGHT — SIGNIFICANT CHANGE UP
SODIUM SERPL-SCNC: 137 MMOL/L — SIGNIFICANT CHANGE UP (ref 135–145)
SODIUM SERPL-SCNC: 139 MMOL/L — SIGNIFICANT CHANGE UP (ref 135–145)
SODIUM UR-SCNC: 68 MMOL/L — SIGNIFICANT CHANGE UP
SP GR SPEC: 1.01 — SIGNIFICANT CHANGE UP (ref 1–1.03)
SQUAMOUS # UR AUTO: 1 /HPF — SIGNIFICANT CHANGE UP (ref 0–5)
THC UR QL: NEGATIVE — SIGNIFICANT CHANGE UP
TOXICOLOGY SCREEN, DRUGS OF ABUSE, SERUM RESULT: SIGNIFICANT CHANGE UP
TSH SERPL-MCNC: 3.38 UIU/ML — SIGNIFICANT CHANGE UP (ref 0.27–4.2)
URATE SERPL-MCNC: 16.3 MG/DL — HIGH (ref 2.5–7)
URATE SERPL-MCNC: 21.6 MG/DL — HIGH (ref 2.5–7)
UROBILINOGEN FLD QL: 1 MG/DL — SIGNIFICANT CHANGE UP (ref 0.2–1)
UUN UR-MCNC: 387 MG/DL — SIGNIFICANT CHANGE UP
WBC # BLD: 43.83 K/UL — CRITICAL HIGH (ref 3.8–10.5)
WBC # BLD: 46.85 K/UL — CRITICAL HIGH (ref 3.8–10.5)
WBC # BLD: 47.7 K/UL — CRITICAL HIGH (ref 3.8–10.5)
WBC # FLD AUTO: 43.83 K/UL — CRITICAL HIGH (ref 3.8–10.5)
WBC # FLD AUTO: 46.85 K/UL — CRITICAL HIGH (ref 3.8–10.5)
WBC # FLD AUTO: 47.7 K/UL — CRITICAL HIGH (ref 3.8–10.5)
WBC UR QL: 1 /HPF — SIGNIFICANT CHANGE UP (ref 0–5)
YEAST-LIKE CELLS: PRESENT

## 2024-10-29 PROCEDURE — 70450 CT HEAD/BRAIN W/O DYE: CPT | Mod: 26

## 2024-10-29 PROCEDURE — 74176 CT ABD & PELVIS W/O CONTRAST: CPT | Mod: 26

## 2024-10-29 PROCEDURE — 93306 TTE W/DOPPLER COMPLETE: CPT | Mod: 26

## 2024-10-29 PROCEDURE — 71250 CT THORAX DX C-: CPT | Mod: 26

## 2024-10-29 PROCEDURE — 99223 1ST HOSP IP/OBS HIGH 75: CPT

## 2024-10-29 PROCEDURE — 99222 1ST HOSP IP/OBS MODERATE 55: CPT | Mod: GC

## 2024-10-29 RX ORDER — ALLOPURINOL 100 MG
100 TABLET ORAL ONCE
Refills: 0 | Status: COMPLETED | OUTPATIENT
Start: 2024-10-29 | End: 2024-10-29

## 2024-10-29 RX ORDER — RASBURICASE 7.5 MG
6 KIT INTRAVENOUS ONCE
Refills: 0 | Status: COMPLETED | OUTPATIENT
Start: 2024-10-29 | End: 2024-10-29

## 2024-10-29 RX ORDER — DILTIAZEM HCL 5 MG/ML
14 VIAL (ML) INTRAVENOUS ONCE
Refills: 0 | Status: COMPLETED | OUTPATIENT
Start: 2024-10-29 | End: 2024-10-29

## 2024-10-29 RX ORDER — PIPERACILLIN AND TAZOBACTAM .5; 4 G/20ML; G/20ML
3.38 INJECTION, POWDER, LYOPHILIZED, FOR SOLUTION INTRAVENOUS EVERY 12 HOURS
Refills: 0 | Status: DISCONTINUED | OUTPATIENT
Start: 2024-10-29 | End: 2024-11-03

## 2024-10-29 RX ORDER — PHYTONADIONE 5 MG/1
10 TABLET ORAL DAILY
Refills: 0 | Status: COMPLETED | OUTPATIENT
Start: 2024-10-30 | End: 2024-10-31

## 2024-10-29 RX ORDER — LORAZEPAM 2 MG
0.5 TABLET ORAL ONCE
Refills: 0 | Status: DISCONTINUED | OUTPATIENT
Start: 2024-10-29 | End: 2024-10-29

## 2024-10-29 RX ORDER — METOPROLOL TARTRATE 50 MG
12.5 TABLET ORAL
Refills: 0 | Status: DISCONTINUED | OUTPATIENT
Start: 2024-10-29 | End: 2024-10-29

## 2024-10-29 RX ORDER — DILTIAZEM HCL 5 MG/ML
30 VIAL (ML) INTRAVENOUS ONCE
Refills: 0 | Status: COMPLETED | OUTPATIENT
Start: 2024-10-29 | End: 2024-10-29

## 2024-10-29 RX ORDER — SODIUM BICARBONATE 1 MEQ/ML
0.26 VIAL (ML) INTRAVENOUS
Qty: 150 | Refills: 0 | Status: DISCONTINUED | OUTPATIENT
Start: 2024-10-29 | End: 2024-10-30

## 2024-10-29 RX ORDER — ALLOPURINOL 100 MG
100 TABLET ORAL DAILY
Refills: 0 | Status: DISCONTINUED | OUTPATIENT
Start: 2024-10-29 | End: 2024-11-07

## 2024-10-29 RX ORDER — FUROSEMIDE 40 MG
40 TABLET ORAL
Refills: 0 | Status: DISCONTINUED | OUTPATIENT
Start: 2024-10-29 | End: 2024-10-29

## 2024-10-29 RX ORDER — METOPROLOL TARTRATE 50 MG
2.5 TABLET ORAL EVERY 6 HOURS
Refills: 0 | Status: DISCONTINUED | OUTPATIENT
Start: 2024-10-29 | End: 2024-11-03

## 2024-10-29 RX ORDER — FUROSEMIDE 40 MG
80 TABLET ORAL
Refills: 0 | Status: COMPLETED | OUTPATIENT
Start: 2024-10-29 | End: 2024-11-03

## 2024-10-29 RX ORDER — PHYTONADIONE 5 MG/1
10 TABLET ORAL ONCE
Refills: 0 | Status: COMPLETED | OUTPATIENT
Start: 2024-10-29 | End: 2024-10-29

## 2024-10-29 RX ORDER — INFLUENZ VIR VAC TV P-SURF2003 15MCG/.5ML
0.5 SYRINGE (ML) INTRAMUSCULAR ONCE
Refills: 0 | Status: DISCONTINUED | OUTPATIENT
Start: 2024-10-29 | End: 2024-11-07

## 2024-10-29 RX ADMIN — Medication 40 MILLIGRAM(S): at 00:15

## 2024-10-29 RX ADMIN — PHYTONADIONE 102 MILLIGRAM(S): 5 TABLET ORAL at 11:28

## 2024-10-29 RX ADMIN — Medication 100 MILLIGRAM(S): at 23:47

## 2024-10-29 RX ADMIN — Medication 30 MILLIGRAM(S): at 03:41

## 2024-10-29 RX ADMIN — PIPERACILLIN AND TAZOBACTAM 25 GRAM(S): .5; 4 INJECTION, POWDER, LYOPHILIZED, FOR SOLUTION INTRAVENOUS at 07:25

## 2024-10-29 RX ADMIN — Medication 80 MILLIGRAM(S): at 14:06

## 2024-10-29 RX ADMIN — Medication 100 MEQ/KG/HR: at 14:04

## 2024-10-29 RX ADMIN — Medication 0.5 MILLIGRAM(S): at 08:33

## 2024-10-29 RX ADMIN — Medication 14 MILLIGRAM(S): at 02:42

## 2024-10-29 RX ADMIN — Medication 2.5 MILLIGRAM(S): at 14:06

## 2024-10-29 RX ADMIN — PIPERACILLIN AND TAZOBACTAM 25 GRAM(S): .5; 4 INJECTION, POWDER, LYOPHILIZED, FOR SOLUTION INTRAVENOUS at 21:56

## 2024-10-29 RX ADMIN — RASBURICASE 108 MILLIGRAM(S): KIT at 14:19

## 2024-10-29 RX ADMIN — Medication 2.5 MILLIGRAM(S): at 21:56

## 2024-10-29 NOTE — CONSULT NOTE ADULT - ASSESSMENT
84-year-old female history of A-fib on Eliquis, CHF on Lasix and Entresto, hypotension, w/ JAK2 positive ET/MPN on cytoreductive therapy w/ anagrelide, presents from cardiologist office due to concern for fluid overload    leukocytosis  UA negative  no evidence of SSTI on exam  CT chest- b/l GGO consistent with edema versus pneumonia.  CT abd/pelvis- no evidence of acute infectious process. right renal cyst contains mildly thick septations  abd US- 1.9 cm gallstone. Mild gallbladder wall thickening. No pericholecystic fluid. equivocal for cholecystitis.  s/p surgery eval- low suspicion for cholecystitis, not a surgical candidate    CHF, c/f congestive hepatopathy  b/l LE edema  pleural effusions, ascites, anasarca on CT    ET/MPN  leukocytosis likely significantly in part 2/2 ET/MPN but infection needs to be ruled out  LDH, uric acid elevated  - rasburicase given, on allopurinol    Recommendations  c/w zosyn for now  f/u blood cultures  diuresis per cardiology  TTE pending  trend temps, wbc    Tom Kirk M.D.  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  CT abd/pelvis- no evidence of acute infectious process. right renal cyst contains mildly thick septations  abd US- 1.9 cm gallstone. Mild gallbladder wall thickening. No pericholecystic fluid. equivocal for cholecystitis.  s/p surgery eval- low suspicion for cholecystitis, not a surgical candidate    CHF, c/f congestive hepatopathy  b/l LE edema  pleural effusions, ascites, anasarca on CT    ET/MPN  leukocytosis likely significantly in part 2/2 ET/MPN but infection needs to be ruled out  LDH, uric acid elevated  - rasburicase given, on allopurinol    Recommendations  c/w zosyn for now  f/u blood cultures  diuresis per cardiology  TTE pending  trend temps, wbc    Tom Kirk M.D.  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  CT abd/pelvis- no evidence of acute infectious process. right renal cyst contains mildly thick septations  abd US- 1.9 cm gallstone. Mild gallbladder wall thickening. No pericholecystic fluid. equivocal for cholecystitis.  s/p surgery eval- low suspicion for cholecystitis, not a surgical candidate    CHF, elevated liver enzymes  c/f congestive hepatopathy  b/l LE edema  pleural effusions, ascites, anasarca on CT    ET/MPN  leukocytosis likely significantly in part 2/2 ET/MPN but infection needs to be ruled out  LDH, uric acid elevated  - rasburicase given, on allopurinol    Recommendations  c/w zosyn for now  f/u blood cultures  diuresis per cardiology  TTE pending  trend temps, wbc, liver enzymes, creatinine    Tom Kirk M.D.  [facility], Division of Infectious Diseases  [phone]  After 5pm on weekdays and all day on weekends - please call [phone]

## 2024-10-29 NOTE — CONSULT NOTE ADULT - ASSESSMENT
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 Waller on cytoreductive therapy w/ anagrelide who presents with shortness of breath and edema. Hematology consulted for hx of MPN in the setting of leukocytosis of 49K. Differential mainly neutrophilic, no reported blast on differential. Elevated LFTs and pro-BNP along with EDA on CKD, and elevated lactate and INR > 10 on labs.       Recommendations:  # JAK2 positive Essential Thrombocythemia (positive for 17.7% of cells on BM bx 7/6/20)  -Suspect leukocytosis most likely reactive from acute illness.   -Recommend infectious w/u (blood cultures, CXR, UA, urine culture) and abx per primary team given elevated lactate and WBC count. Would cover for intra-abdominal source given elevated LFTs.   -Can hold anagrelide while acute process being worked up.   -Agree with nephro eval for EDA.   - For elevated INR (normal PT and aPPT) please give Vit K 10 mg x 1. Given hgb drop overnight from 10.4 to 8.7 and bleeding is suspected, would give FFP or KCentra.   - Will follow with Dr. Waller at [facility] upon hospital discharge.      NOTE INCOMPLETE UNTIL ATTENDING SIGNS    ***************************************************************  Maritza Lomeli, PGY5  Fellow Hematology/Oncology  pager: [phone]   Available on Microsoft Teams  After 5pm or on weekends please contact  to page on-call fellow   ***************************************************************     83 y/o woman w/ JAK2 positive ET/MPN followed at [facility] by Dr. Waller on cytoreductive therapy w/ anagrelide who presents with shortness of breath and edema. Hematology consulted for hx of MPN in the setting of leukocytosis of 49K. Differential mainly neutrophilic, no reported blast on differential. Elevated LFTs and pro-BNP along with EDA on CKD, elevated uric acid and elevated lactate and INR > 10 on labs.       Recommendations:  # JAK2 positive Essential Thrombocythemia (positive for 17.7% of cells on BM bx 7/6/20)  -Suspect leukocytosis most likely reactive from acute illness.   -Recommend infectious w/u (blood cultures, CXR, UA, urine culture) and abx per primary team given elevated lactate and WBC count. Would cover for intra-abdominal source given elevated LFTs.   -Can hold anagrelide while acute process being worked up.   -Agree with nephro eval for EDA.   - For elevated INR (normal PT and aPPT) please give Vit K 10 mg x 3 days. Given hgb drop overnight from 10.4 to 8.7 and bleeding is suspected, would give FFP or KCentra.   - Appreciate nephro recommendations for EDA workup  - Please hold the home eliquis to work up GIB  - Please give rasburicase 6 mg IV x 1 and start allopurinol 100 mg daily and IVFs given hyperuriciemia  - Will follow with Dr. Waller at [facility] upon hospital discharge.      NOTE INCOMPLETE UNTIL ATTENDING SIGNS    ***************************************************************  Maritza Lomeli, PGY5  Fellow Hematology/Oncology  pager: [phone]   Available on Microsoft Teams  After 5pm or on weekends please contact  to page on-call fellow   ***************************************************************     85 y/o woman w/ JAK2 positive ET/MPN followed at [facility] by Dr. Waller on cytoreductive therapy w/ anagrelide who presents with shortness of breath and edema. Hematology consulted for hx of MPN in the setting of leukocytosis of 49K. Differential mainly neutrophilic, no reported blast on differential. Elevated LFTs and pro-BNP along with EDA on CKD, elevated uric acid and elevated lactate and INR > 10 on labs.       Recommendations:  # JAK2 positive Essential Thrombocythemia (positive for 17.7% of cells on BM bx 7/6/20)  -Suspect leukocytosis most likely reactive from acute illness.   -Recommend infectious w/u (blood cultures, CXR, UA, urine culture) and abx per primary team given elevated lactate and WBC count. Would cover for intra-abdominal source given elevated LFTs.   -Can hold anagrelide while acute process being worked up.   -Agree with nephro eval for EDA.   - For elevated INR (normal PT and aPPT) please give Vit K 10 mg x 3 days. Given hgb drop overnight from 10.4 to 8.7 and bleeding is suspected, would give FFP or KCentra.   - Appreciate nephro recommendations for EDA workup. If nephrology is not performing dialysis would recommend giving rasburicase 6 mg IV x 1 and starting allopurinol 100 mg daily and IVFs given hyperuriciemia  - Please hold the home eliquis to work up GIB. If AC is required and GIB stops, can start hep ggt while inpatient and monitor for bleeding.  - Will follow with Dr. Waller at [facility] upon hospital discharge.      NOTE INCOMPLETE UNTIL ATTENDING SIGNS    ***************************************************************  Maritza Lomeli, PGY5  Fellow Hematology/Oncology  pager: [phone]   Available on Microsoft Teams  After 5pm or on weekends please contact  to page on-call fellow   ***************************************************************

## 2024-10-29 NOTE — H&P ADULT - NSHPLABSRESULTS_GEN_ALL_CORE
10.4   49.74 )-----------( 188      ( 28 Oct 2024 17:24 )             33.1     10-28    136  |  95[L]  |  98[H]  ----------------------------<  105[H]  4.0   |  15[L]  |  4.28[H]    Ca    8.2[L]      28 Oct 2024 20:19  Mg     2.30     10-28    TPro  5.9[L]  /  Alb  3.3  /  TBili  2.0[H]  /  DBili  x   /  AST  1198[H]  /  ALT  663[H]  /  AlkPhos  219[H]  10-28    < from: US Abdomen Complete (10.28.24 @ 21:14) >      IMPRESSION:    1.9 cm gallstone. Mild gallbladder wall thickening measuring up to 4 mm.   No pericholecystic fluid. There is no pericholecystic fluid and there is   no sonographic Joyce sign. Findings are equivocal for cholecystitis.    Atrophic right kidney. Multiple renal cysts. A 1.9 x 1.8 x 2.0 cm mid to   upper pole right renal cyst contains mildly thick septations. Recommend   six-month follow-up.    Small bilateral pleural effusions. Trace perihepatic ascites.      < end of copied text >    < from: US Duplex Venous Lower Ext Ltd, Left (10.28.24 @ 18:09) >    IMPRESSION:  No evidence of left lower extremity deep venous thrombosis.  LEFT calf edema.      < end of copied text >

## 2024-10-29 NOTE — CONSULT NOTE ADULT - ATTENDING COMMENTS
84F with PMH of JAK2+ ET (on anagrelide), who presented with shortness of breath and edema in the setting of acute renal failure and heart failure.     # ET: She follows with Dr. Waller at [facility]. Diagnosed in 2020 with ET. Her platelet count is currently at goal (< 400).  - Hold anagrelide given her current liver and renal dysfunction  - Trend CBC with differential daily. Continue supportive transfusions as needed to maintain Hgb > 7 and plt > 10  (> 15 if febrile, > 50 if bleeding).     # Prolonged PT/INR: She is on Eliquis at baseline, which can elevate the INR. She is now presenting with INR > 10. No clear bleeding, but her Hgb is dropping.   - Recommend trial of vitamin K  - Given suspected bleeding with Hgb drop, recommend FFP for reversal of her INR.   - Trend coagulation labs (PT, PTT, fibrinogen) daily.       Will follow with Dr. Waller at [facility] upon hospital discharge.

## 2024-10-29 NOTE — H&P ADULT - NSHPPHYSICALEXAM_GEN_ALL_CORE
GENERAL: NAD, lying in bed comfortably  EYES: EOMI, PERRLA, conjunctiva and sclera clear  NECK: Supple, trachea midline, no JVD  HEART: Regular rate and rhythm, no murmurs, rubs, or gallops  LUNGS: Unlabored respirations.  +mild crackles at bases bilaterally  ABDOMEN: Soft, nontender, nondistended, +BS  EXTREMITIES: 2+ pitting edema in b/l lower extremities  NERVOUS SYSTEM:  A&Ox1-2, moving all extremities, not following commands and only answering yes/no   SKIN: No rashes or lesions

## 2024-10-29 NOTE — CONSULT NOTE ADULT - ATTENDING COMMENTS
84 F hx of HF, Afib on Eliquis JAK2 mutation presenting with SOB, difficulty walking, fatigue, and increased leg swelling. Surgical consultation obtained to rule out biliary pathology. WBC 49, tbili 2.0, alk phos 219, AST 1198, , lactate 4.5. US Abd showing 1.9 cm gallstone, mild wall thickening up to 4 mm, no pericholecystic fluid.    # Rule out Cholecystitis  - Given patient presentations, clinical examination, do not suspect cholecystitis. GB findings possibly secondary to heart failure?  - Not a surgical candidate at this time.   - If concerns persist for cholecystitis, can consider HIDA, however, believe that this would be low yield.   - Agree with MICU admission for further workup of HF.

## 2024-10-29 NOTE — CONSULT NOTE ADULT - SUBJECTIVE AND OBJECTIVE BOX
History of Present Illness:  84 F hx of HF, Afib on eliqus, JAK2 mutation presenting with SOB, difficulty walking, fatigue, and increased leg swelling. In the ED, vitals normal. WBC 49, tbili 2.0, alk phos 219, AST 1198, , lactate 4.5. US Abd showing 1.9 cm gallstone, mild wall thickening up to 4 mm, no pericholecystic fluid. Spoke with son and daughter in law, pt has been having no abd symptoms.     Past Medical History  Thrombocytosis    Hypertension    Hyperlipidemia    Past Surgical History    MEDICATIONS  (STANDING):    MEDICATIONS  (PRN):    Vital Signs Last 24 Hrs  T(C): 37 (10-28-24 @ 19:31), Max: 37.5 (10-28-24 @ 16:30)  T(F): 98.6 (10-28-24 @ 19:31), Max: 99.5 (10-28-24 @ 16:30)  HR: 110 (10-28-24 @ 19:31) (76 - 122)  BP: 100/58 (10-28-24 @ 19:31) (100/58 - 142/74)  RR: 32 (10-28-24 @ 19:31) (17 - 32)  SpO2: 97% (10-28-24 @ 19:31) (96% - 98%)           Daily Height in cm: 149.86 (28 Oct 2024 14:44)    Daily   Admit Wt: Drug Dosing Weight  Height (cm): 149.9 (28 Oct 2024 14:44)  Weight (kg): 57.2 (28 Oct 2024 14:44)  BMI (kg/m2): 25.5 (28 Oct 2024 14:44)  BSA (m2): 1.52 (28 Oct 2024 14:44)    Allergies: No Known Allergies    FAMILY HISTORY:    Review of Systems  above    PHYSICAL EXAM  General: NAD.                                              Neuro: AO2                Abd: soft, non-tender, non-distended  Extremities: warm, edema                                                          LABS:                        10.4   49.74 )-----------( 188      ( 28 Oct 2024 17:24 )             33.1     10-28    136  |  95[L]  |  98[H]  ----------------------------<  105[H]  4.0   |  15[L]  |  4.28[H]    Ca    8.2[L]      28 Oct 2024 20:19  Mg     2.30     10-28    TPro  5.9[L]  /  Alb  3.3  /  TBili  2.0[H]  /  DBili  x   /  AST  1198[H]  /  ALT  663[H]  /  AlkPhos  219[H]  10-28      IMAGING    ACC: [id number] EXAM:  US ABDOMEN COMPLETE   ORDERED BY: MICHAEL RICHEY     PROCEDURE DATE:  10/28/2024      INTERPRETATION:  CLINICAL INFORMATION: Elevated LFTs and bilirubin    COMPARISON: None available.    TECHNIQUE: Sonography of the abdomen and urinary bladder.    FINDINGS:  Liver: Within normal limits.  Bile ducts: Normal caliber. Common bile duct measures 3 mm.  Gallbladder: 1.9 cm gallstone. Mild gallbladder wall thickening measuring   up to 4 mm. No pericholecystic fluid. There is nopericholecystic fluid   and there is no sonographic Joyce sign. Findings are equivocal for   cholecystitis.  Pancreas: Poorly visualized.  Spleen: 11.9 cm. punctate calcification in the spleen.  Right kidney: 7.6 cm. atrophic right kidney. No hydronephrosis. Multiple   renal cysts. A 1.9 x 1.8 x 2.0 cm mid to upper pole right renal cyst   contains mildly thick septations. Recommend six-month follow-up.  Left kidney: 10.1 cm. No hydronephrosis. Multiple cysts measuring up to   2.6 cm in the interpolar region.  Ascites: Trace perihepatic ascites.  Aorta and IVC: Visualized portions are within normal limits.    Urinary bladder: Within normal limits. Left jet visualized. Right jet not   visualized.    Small bilateral pleural effusions are noted.    IMPRESSION:    1.9 cm gallstone. Mild gallbladder wall thickening measuring up to 4 mm.   No pericholecystic fluid. There is no pericholecystic fluid and there is   no sonographic Joyce sign. Findings are equivocal for cholecystitis.    Atrophic right kidney. Multiple renal cysts. A 1.9 x 1.8 x 2.0 cm mid to   upper pole right renal cyst contains mildly thick septations. Recommend   six-month follow-up.    Small bilateral pleural effusions. Trace perihepatic ascites.      --- End of Report ---          SADAF BUNN MD; Resident Radiologist  This document has been electronically signed.  LEE ANN OVIEDO MD; Attending Radiologist  This document has been electronically signed. Oct 28 2024  9:48PM     History of Present Illness:  84 F hx of HF, Afib on Eliquis JAK2 mutation presenting with SOB, difficulty walking, fatigue, and increased leg swelling. In the ED, vitals normal. WBC 49, tbili 2.0, alk phos 219, AST 1198, , lactate 4.5. US Abd showing 1.9 cm gallstone, mild wall thickening up to 4 mm, no pericholecystic fluid. Spoke with son and daughter in law, pt has been having no abd symptoms.     Past Medical History  Thrombocytosis    Hypertension    Hyperlipidemia    Past Surgical History    MEDICATIONS  (STANDING):    MEDICATIONS  (PRN):    Vital Signs Last 24 Hrs  T(C): 37 (10-28-24 @ 19:31), Max: 37.5 (10-28-24 @ 16:30)  T(F): 98.6 (10-28-24 @ 19:31), Max: 99.5 (10-28-24 @ 16:30)  HR: 110 (10-28-24 @ 19:31) (76 - 122)  BP: 100/58 (10-28-24 @ 19:31) (100/58 - 142/74)  RR: 32 (10-28-24 @ 19:31) (17 - 32)  SpO2: 97% (10-28-24 @ 19:31) (96% - 98%)           Daily Height in cm: 149.86 (28 Oct 2024 14:44)    Daily   Admit Wt: Drug Dosing Weight  Height (cm): 149.9 (28 Oct 2024 14:44)  Weight (kg): 57.2 (28 Oct 2024 14:44)  BMI (kg/m2): 25.5 (28 Oct 2024 14:44)  BSA (m2): 1.52 (28 Oct 2024 14:44)    Allergies: No Known Allergies    FAMILY HISTORY:    Review of Systems  above    PHYSICAL EXAM  General: NAD.                                              Neuro: AO2                Abd: soft, non-tender, non-distended  Extremities: warm, edema                                                          LABS:                        10.4   49.74 )-----------( 188      ( 28 Oct 2024 17:24 )             33.1     10-28    136  |  95[L]  |  98[H]  ----------------------------<  105[H]  4.0   |  15[L]  |  4.28[H]    Ca    8.2[L]      28 Oct 2024 20:19  Mg     2.30     10-28    TPro  5.9[L]  /  Alb  3.3  /  TBili  2.0[H]  /  DBili  x   /  AST  1198[H]  /  ALT  663[H]  /  AlkPhos  219[H]  10-28      IMAGING    ACC: [id number] EXAM:  US ABDOMEN COMPLETE   ORDERED BY: MICHAEL RICHEY     PROCEDURE DATE:  10/28/2024      INTERPRETATION:  CLINICAL INFORMATION: Elevated LFTs and bilirubin    COMPARISON: None available.    TECHNIQUE: Sonography of the abdomen and urinary bladder.    FINDINGS:  Liver: Within normal limits.  Bile ducts: Normal caliber. Common bile duct measures 3 mm.  Gallbladder: 1.9 cm gallstone. Mild gallbladder wall thickening measuring   up to 4 mm. No pericholecystic fluid. There is nopericholecystic fluid   and there is no sonographic Joyce sign. Findings are equivocal for   cholecystitis.  Pancreas: Poorly visualized.  Spleen: 11.9 cm. punctate calcification in the spleen.  Right kidney: 7.6 cm. atrophic right kidney. No hydronephrosis. Multiple   renal cysts. A 1.9 x 1.8 x 2.0 cm mid to upper pole right renal cyst   contains mildly thick septations. Recommend six-month follow-up.  Left kidney: 10.1 cm. No hydronephrosis. Multiple cysts measuring up to   2.6 cm in the interpolar region.  Ascites: Trace perihepatic ascites.  Aorta and IVC: Visualized portions are within normal limits.    Urinary bladder: Within normal limits. Left jet visualized. Right jet not   visualized.    Small bilateral pleural effusions are noted.    IMPRESSION:    1.9 cm gallstone. Mild gallbladder wall thickening measuring up to 4 mm.   No pericholecystic fluid. There is no pericholecystic fluid and there is   no sonographic Joyce sign. Findings are equivocal for cholecystitis.    Atrophic right kidney. Multiple renal cysts. A 1.9 x 1.8 x 2.0 cm mid to   upper pole right renal cyst contains mildly thick septations. Recommend   six-month follow-up.    Small bilateral pleural effusions. Trace perihepatic ascites.      --- End of Report ---          SADAF BUNN MD; Resident Radiologist  This document has been electronically signed.  LEE ANN OVIEDO MD; Attending Radiologist  This document has been electronically signed. Oct 28 2024  9:48PM

## 2024-10-29 NOTE — H&P ADULT - PROBLEM SELECTOR PLAN 2
-Cr acutely increased to 4.28, on 10/14 was ~2.5  -Atrophic kidney seen on abdominal ultrasound  -Ddx progressive CKD vs. cardiorenal syndrome vs. infection    -Monitor I&O's  [] f/u bladder scan, urine lytes  [] f/u VBG w/ lactate   [] Nephro following, appreciate recs

## 2024-10-29 NOTE — CONSULT NOTE ADULT - CONSULT REASON
leukocytosis
Elevated INR
abnormal ekg
GOC
Gallstones
SOB  Rapid Afib
Lab derangement
EDA on CKD, Acidosis

## 2024-10-29 NOTE — CHART NOTE - NSCHARTNOTEFT_GEN_A_CORE
Overnight Medicine ACP Coverage Overnight Medicine ACP Coverage    Went to bedside to discuss possible FFP transfusion as per Heme recs. Discussed need to reverse coagulopathy given elevated INR with downtrending Hgb. Son Leno present at bedside. HCP also called Dr. Ginger Guerin (daughter-in-law, PCP). Results discussed extensively and updated. Attempted to receive consent regarding transfusion of FFP and blood transfusion. At this time given improvement of INR with vitamin K and holding Eliquis Current INR is 6.71. Family would like to hold off transfusions at this time given patients sensitivity and hx of allergic reactions. Family would like to discuss transfusions in the AM based on morning labs.    Risks, benefits, and alternatives of blood transfusion provided including:  Risks:  •	Infections including: HIV, Hepatitis C and Hepatitis B, and West Nile. Risks are about 1/800,000 of a percent  •	Bacterial Infections  •	Transfusion Associated Circulation Overload   •	Transfusion Associated Lung Injury due to foreign antibodies attacking the patients lung causing possibility of long term intubation.  •	Fever, seizures, and worsening anemia and jaundice including anaphylaxis and death.    Benefits:  •	Improved blood supply to the major organs of the body including:, brain, heart, kidney and lungs.  •	Risk of not receiving blood including:  worsening fatigue, shortness of breath, stroke, cardiac arrest  •	Alternatives including: epogen, IV iron  •	Also explained that each blood product is triple screened to avoid such risks.    Conversation witnessed by ACP Colleague    Khoi Charles PA-C  Department of Medicine  [facility]  [id number] Overnight Medicine ACP Coverage    Went to bedside to discuss possible FFP transfusion as per [facility] recs. Discussed need to reverse coagulopathy given elevated INR with downtrending Hgb. Son Leno present at bedside. HCP also called Dr. Ginger Guerin (daughter-in-law, PCP). Results discussed extensively and updated. Attempted to receive consent regarding transfusion of FFP and blood transfusion. At this time given improvement of INR with vitamin K and holding Eliquis Current INR is 6.71. Family would like to hold off transfusions at this time given patients sensitivity and hx of allergic reactions. Family would like to discuss transfusions in the AM based on morning labs.    Risks, benefits, and alternatives of blood transfusion provided including:  Risks:  •	Infections including: HIV, Hepatitis C and Hepatitis B, and West Nile. Risks are about 1/800,000 of a percent  •	Bacterial Infections  •	Transfusion Associated Circulation Overload   •	Transfusion Associated Lung Injury due to foreign antibodies attacking the patients lung causing possibility of long term intubation.  •	Fever, seizures, and worsening anemia and jaundice including anaphylaxis and death.    Benefits:  •	Improved blood supply to the major organs of the body including:, brain, heart, kidney and lungs.  •	Risk of not receiving blood including:  worsening fatigue, shortness of breath, stroke, cardiac arrest  •	Alternatives including: epogen, IV iron  •	Also explained that each blood product is triple screened to avoid such risks.    Conversation witnessed by ACP Colleague    Khoi Charles PA-C  Department of Medicine  [facility]  [id number]    Addendum 10/29/24 23:53:  Previously discussed with family (at 9:50pm) option to drawn STAT ammonia given previous samples were not send on Ice. Family would like ammonia to be sent with AM labs. Discussed with Heme fellow (Dr. Cox) overnight regarding elevated uric acid and INR. Both values are down trending. Continue with management. Patient passed dysphagia screen, STAT dose of allopurinol overnight. Relayed to heme, family at this time would like to monitor INR with AM labs and are holding off on transfusions at this time. Heme will continue to follow.

## 2024-10-29 NOTE — CONSULT NOTE ADULT - SUBJECTIVE AND OBJECTIVE BOX
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  Collateral obtained from son and daughter in law who is her PCP. She has been unwell for the past week, lives above them on the second floor and at baseline is independent and ambulatory, but has been in bed and with poor PO intake over the past week. Per son had significant leg edema preventing her from walking, also w/ poor PO intake. Appeared to have shortness of breath. Symptoms started last Monday.  In the ED was in afib w/ RVR. Labs significant for WBC 49, tbili 2.0, alk phos 219, AST 1198, , Cr. 4.3 (baseline mid 2s), metabolic acidosis with lactate elevation to 5, VBG 7.23/37/15/51, proBNP 35k, Trop 78 --> 69, Ca 8.2. Abdominal US with gallbladder wall thickening.   S/p zosyn, lasix 40 mg IVP x2, metoprolol 25mg, lopressor IV 5mg x3, sodium bicarb 50 IVP, diltiazem 30mg.    (29 Oct 2024 05:17)    Somnolent after given ativan.  Able to lie flat.  AFib was rapid on arrival, now rate-controlled after IV AVN blockers.  Not able to participate in HPI/ROS due to somnolence at this time.    PAST MEDICAL & SURGICAL HISTORY:  Thrombocytosis  Hypertension  Hyperlipidemia  Afib  CHF (congestive heart failure)  No significant past surgical history    MEDICATIONS  (STANDING):  allopurinol 100 milliGRAM(s) Oral daily  furosemide   Injectable 80 milliGRAM(s) IV Push two times a day  piperacillin/tazobactam IVPB.. 3.375 Gram(s) IV Intermittent every 12 hours  rasburicase IVPB 6 milliGRAM(s) IV Intermittent once  sodium bicarbonate  Infusion 0.262 mEq/kG/Hr (100 mL/Hr) IV Continuous <Continuous>    Allergies    No Known Drug Allergies  eggs (Unknown)    Intolerances    FAMILY HISTORY:  No pertinent family history in first degree relatives      Non-contributary for premature coronary disease or sudden cardiac death    SOCIAL HISTORY:    [x ] Non-smoker  [ ] Smoker  [ ] Alcohol      PHYSICAL EXAM:  T(C): 36.4 (10-29-24 @ 08:50), Max: 37.5 (10-28-24 @ 16:30)  HR: 97 (10-29-24 @ 08:50) (76 - 122)  BP: 116/70 (10-29-24 @ 08:50) (100/58 - 142/74)  RR: 28 (10-29-24 @ 08:50) (17 - 32)  SpO2: 97% (10-29-24 @ 08:50) (96% - 100%)  Wt(kg): --    Appearance: Normal size elderly woman, somnolent after sedation.	  HEENT:   Normal oral mucosa, PERRL, EOMI	  Lymphatic: No lymphadenopathy , + edema in BL feet  Cardiovascular: irregular S1 S2, No JVD, No murmurs , Peripheral pulses palpable 2+ bilaterally  Respiratory: Lungs clear to auscultation, normal effort 	  Gastrointestinal:  Soft, Non-tender, + BS	  Skin: No rashes, No ecchymoses, No cyanosis, warm to touch  Musculoskeletal: No spontaneous movement due to sedation.  Psychiatry:  Mood & affect unable to determine due to sedation.    TELEMETRY: AF RVR --> rate controlled	    ECG: AF / RVR  	  LABS:	 	                          8.7    46.85 )-----------( 209      ( 29 Oct 2024 10:12 )             28.6     10-29    137  |  99  |  106[H]  ----------------------------<  110[H]  4.1   |  16[L]  |  4.31[H]    Ca    8.2[L]      29 Oct 2024 06:49  Phos  4.6     10-29  Mg     2.10     10-29    TPro  5.5[L]  /  Alb  3.0[L]  /  TBili  2.0[H]  /  DBili  x   /  AST  615[H]  /  ALT  492[H]  /  AlkPhos  189[H]  10-29    TSH: Thyroid Stimulating Hormone, Serum: 3.38 uIU/mL (10-29 @ 06:49)      ASSESSMENT/PLAN: Ms Guerin is an 84y Female brought in from home with altered mental status.  Hyperuricemia, uremia / acute kidney injury, ? of tumor lysis syndrome. WBC# nearly 50k.  AFib is under better rate control after IV diltiazem and metoprolol.  When awake, can resume oral metoprolol 50mg PO Q6hrs, with holding parameters for HR<50 at rest.  Consider anticoagulation with HEPARIN pending resolution of EDA, and potential for invasive procedures while hospitalized.  Dose of apixaban on discharge would be 2.5mg BID, as she is >79yo and weight is <60kg.  No invasive EP procedures planned.    Jero Waite M.D.  Cardiac Electrophysiology    office [phone]  pager [phone]

## 2024-10-29 NOTE — H&P ADULT - NSICDXPASTMEDICALHX_GEN_ALL_CORE_FT
PAST MEDICAL HISTORY:  Afib     CHF (congestive heart failure)     Hyperlipidemia     Hypertension     Thrombocytosis

## 2024-10-29 NOTE — CONSULT NOTE ADULT - ASSESSMENT
84 F hx of HF, Afib on Eliquis JAK2 mutation presenting with SOB, difficulty walking, fatigue, and increased leg swelling with objective data significant for acute renal failure, congestive hepatopathy, MICU consulted re: recommendations for disposition.     #Afib  #Elevated transaminases  #Metabolic acidosis  -Pt with worsening dyspnea, LE edema and found to have renal failure  -US abdomen significant for thickened gallbladder wall w/o sonographic Joyce and no pericholecystic fluid  -Presenting symptoms reflective of volume overload (?tachyarrhythmia +/- decompensated heart failure)  > agree w/ hepatology eval for elevated transaminases (may be congestive given other complaints)  > f/u nephro recs re: renal failure and acidosis  > TTE  > monitor on tele    No need for MICU at this time. Please call back with any questions or concerns. Note not finalized until signed and attested by attending.

## 2024-10-29 NOTE — H&P ADULT - PROBLEM SELECTOR PLAN 6
-Significant elevation in AST/ALT 1198/603, prior normal 2 weeks ago  -Ddx infection vs. hepatorenal vs. toxin  -Per chart review patient was on a statin that she stopped taking because of myalgias  -Also w/ gallbladder wall edema c/f cholecystitis on US, but exam is negative  -- pattern of liver injury is not cholestatic either  -Surgery following, no acute intervention at this time but recommend HIDA if continued concern for tomy

## 2024-10-29 NOTE — PROGRESS NOTE ADULT - SUBJECTIVE AND OBJECTIVE BOX
Patient is a 84y old  Female who presents with a chief complaint of shortness of breath and leg edema (29 Oct 2024 12:32)      SUBJECTIVE / OVERNIGHT EVENTS: Pt seen and examined at 12:45pm, no overnight events, pt had received ativan earlier today for restlessness per nsg, pt is arousable, able to say her name but does not answer further questions, closes her eyes unable to get further history due to her mental status. No reports of any bleeding.    MEDICATIONS  (STANDING):  allopurinol 100 milliGRAM(s) Oral daily  furosemide   Injectable 80 milliGRAM(s) IV Push two times a day  metoprolol tartrate Injectable 2.5 milliGRAM(s) IV Push every 6 hours  piperacillin/tazobactam IVPB.. 3.375 Gram(s) IV Intermittent every 12 hours  rasburicase IVPB 6 milliGRAM(s) IV Intermittent once  sodium bicarbonate  Infusion 0.262 mEq/kG/Hr (100 mL/Hr) IV Continuous <Continuous>    MEDICATIONS  (PRN):      Vital Signs Last 24 Hrs  T(C): 36.4 (29 Oct 2024 12:44), Max: 37.5 (28 Oct 2024 16:30)  T(F): 97.6 (29 Oct 2024 12:44), Max: 99.5 (28 Oct 2024 16:30)  HR: 98 (29 Oct 2024 12:44) (76 - 122)  BP: 124/55 (29 Oct 2024 12:44) (100/58 - 142/74)  BP(mean): 75 (29 Oct 2024 02:48) (75 - 75)  RR: 27 (29 Oct 2024 12:44) (17 - 32)  SpO2: 99% (29 Oct 2024 12:44) (96% - 100%)    Parameters below as of 29 Oct 2024 12:44  Patient On (Oxygen Delivery Method): room air      CAPILLARY BLOOD GLUCOSE      POCT Blood Glucose.: 96 mg/dL (28 Oct 2024 14:48)    I&O's Summary      PHYSICAL EXAM:  GENERAL: NAD  CHEST/LUNG: Decreased bs bilaterally  HEART: Irregularly irregulr  ABDOMEN: Soft, Nontender, Nondistended  EXTREMITIES:  b/l LE edema+  PSYCH: calm now  NEUROLOGY: Arousable, able to say her first name only, does not engage in further conversation      LABS:                        8.7    46.85 )-----------( 209      ( 29 Oct 2024 10:12 )             28.6     10-29    137  |  99  |  106[H]  ----------------------------<  110[H]  4.1   |  16[L]  |  4.31[H]    Ca    8.2[L]      29 Oct 2024 06:49  Phos  4.6     10-29  Mg     2.10     10-29    TPro  5.5[L]  /  Alb  3.0[L]  /  TBili  2.0[H]  /  DBili  x   /  AST  615[H]  /  ALT  492[H]  /  AlkPhos  189[H]  10-29    PT/INR - ( 29 Oct 2024 06:49 )   PT: 120.3 sec;   INR: 10.62 ratio         PTT - ( 29 Oct 2024 06:49 )  PTT:31.9 sec      Urinalysis Basic - ( 29 Oct 2024 06:49 )    Color: x / Appearance: x / SG: x / pH: x  Gluc: 110 mg/dL / Ketone: x  / Bili: x / Urobili: x   Blood: x / Protein: x / Nitrite: x   Leuk Esterase: x / RBC: x / WBC x   Sq Epi: x / Non Sq Epi: x / Bacteria: x        RADIOLOGY & ADDITIONAL TESTS:  < from: CT Head No Cont (10.29.24 @ 09:51) >  CT BRAIN     < end of copied text >  < from: CT Head No Cont (10.29.24 @ 09:51) >  No acute pathology.    < end of copied text >  < from: CT Abdomen and Pelvis No Cont (10.29.24 @ 10:01) >ct chest   CT ABDOMEN AND PELVIS    < end of copied text >  < from: CT Abdomen and Pelvis No Cont (10.29.24 @ 10:01) >  *  Bilateral pulmonary groundglass opacity consistent with edema versus   pneumonia.  *  Trace bilateral pleural effusions, ascites, and anasarca.  *  No evidence of acute infectious process in the abdomen and pelvis.  *  Bilateral renal cysts and right renal atrophy. No hydronephrosis.      < end of copied text >    Imaging Personally Reviewed:    Consultant(s) Notes Reviewed:      Care Discussed with Consultants/Other Providers:

## 2024-10-29 NOTE — CONSULT NOTE ADULT - PROBLEM SELECTOR RECOMMENDATION 3
Thank you for allowing us to participate in your patient's care. We will continue to follow with you. In the event of worsening symptoms, please contact the Palliative Medicine team via pager (if the patient is at [facility] #[phone] or if the patient is at [facility] #[phone]) The Geriatric and Palliative Medicine service has coverage 24 hours a day/ 7 days a week to provide medical recommendations regarding symptom management needs via telephone.     Ness Ambrose M.D.  Geriatrics and Palliative Care Fellow  Available via TEAMS

## 2024-10-29 NOTE — H&P ADULT - PROBLEM SELECTOR PLAN 4
-Afib w/ RVR in the ED, requiring multiple doses of lopressor, diltiazem, metoprolol  -C/w metoprolol succinate 50mg PO qd   -C/w eliquis -Afib w/ RVR in the ED, requiring multiple doses of lopressor, diltiazem, metoprolol  -C/w metoprolol succinate 50mg PO qd   -Hold  eliquis given elevated INR

## 2024-10-29 NOTE — CONSULT NOTE ADULT - PROBLEM SELECTOR RECOMMENDATION 2
Pt does not have capacity for complex medical decision making   Surrogate: Pts adult children - Vladimir   Code status: Full Pt does not have capacity for complex medical decision making   Surrogate: Pts adult children - son Leno at bedside, reports Pt also has a daughter, and another son from a different partner (half brother) who is close with her.   Code status: Full  GOC: medical management/ work up

## 2024-10-29 NOTE — CONSULT NOTE ADULT - ASSESSMENT
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 P/w acute  SOB, fatigue, and B/l LE swelling. On admission, altered in afib w/ rvr, w/ ADHF, HAGMA, acute renal failure, acute liver failure, and c/f severe sepsis. Work up thus far unrevealing. GaP team consulted for GOC.

## 2024-10-29 NOTE — CONSULT NOTE ADULT - SUBJECTIVE AND OBJECTIVE BOX
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  Collateral obtained from son and daughter in law who is her PCP. She has been unwell for the past week, lives above them on the second floor and at baseline is independent and ambulatory, but has been in bed and with poor PO intake over the past week. Per son had significant leg edema preventing her from walking, also w/ poor PO intake. Appeared to have shortness of breath. Symptoms started last Monday.  In the ED was in afib w/ RVR. Labs significant for WBC 49, tbili 2.0, alk phos 219, AST 1198, , Cr. 4.3 (baseline mid 2s), metabolic acidosis with lactate elevation to 5, VBG 7.23/37/15/51, proBNP 35k, Trop 78 --> 69, Ca 8.2. Abdominal US with gallbladder wall thickening.   S/p zosyn, lasix 40 mg IVP x2, metoprolol 25mg, lopressor IV 5mg x3, sodium bicarb 50 IVP, diltiazem 30mg.    (29 Oct 2024 05:17)      14 point ROS otherwise negative    PAST MEDICAL & SURGICAL HISTORY:  Thrombocytosis      Hypertension      Hyperlipidemia      Afib      CHF (congestive heart failure)      No significant past surgical history          Allergies    No Known Drug Allergies  eggs (Unknown)    Intolerances        MEDICATIONS  (STANDING):  furosemide   Injectable 40 milliGRAM(s) IV Push two times a day  phytonadione  IVPB 10 milliGRAM(s) IV Intermittent once  piperacillin/tazobactam IVPB.. 3.375 Gram(s) IV Intermittent every 12 hours    MEDICATIONS  (PRN):      FAMILY HISTORY:  No pertinent family history in first degree relatives        SOCIAL HISTORY: No EtOH, no tobacco    Height (cm): 149.9 (10-28 @ 14:44)  Weight (kg): 57.2 (10-28 @ 14:44)  BMI (kg/m2): 25.5 (10-28 @ 14:44)  BSA (m2): 1.52 (10-28 @ 14:44)    VITALS:   T(F): 97.5 (10-29-24 @ 08:50), Max: 99.5 (10-28-24 @ 16:30)  HR: 97 (10-29-24 @ 08:50)  BP: 116/70 (10-29-24 @ 08:50)  RR: 28 (10-29-24 @ 08:50)  SpO2: 97% (10-29-24 @ 08:50)  Wt(kg): --    PHYSICAL EXAM    GENERAL: NAD, well-developed  HEAD:  Atraumatic, Normocephalic  EYES: EOMI, PERRLA, conjunctiva and sclera clear  NECK: Supple, No JVD  CHEST/LUNG: Clear to auscultation bilaterally; No wheeze  HEART: Regular rate and rhythm; No murmurs, rubs, or gallops  ABDOMEN: Soft, Nontender, Nondistended; Bowel sounds present  EXTREMITIES:  2+ Peripheral Pulses, No clubbing, cyanosis, or edema  NEUROLOGY: non-focal  SKIN: No rashes or lesions    LABS:                         8.7    x     )-----------( 161      ( 29 Oct 2024 06:49 )             28.2     10-29    137  |  99  |  106[H]  ----------------------------<  110[H]  4.1   |  16[L]  |  4.31[H]    Ca    8.2[L]      29 Oct 2024 06:49  Phos  4.6     10-29  Mg     2.10     10-29    TPro  5.5[L]  /  Alb  3.0[L]  /  TBili  2.0[H]  /  DBili  x   /  AST  615[H]  /  ALT  492[H]  /  AlkPhos  189[H]  10-29    Lactate Dehydrogenase, Serum: 843 U/L (10-29 @ 08:28)  Uric Acid: 21.6 mg/dL (10-29 @ 08:28)  Magnesium: 2.10 mg/dL (10-29 @ 06:49)  Phosphorus: 4.6 mg/dL (10-29 @ 06:49)  Magnesium: 2.30 mg/dL (10-28 @ 17:24)    PT/INR - ( 29 Oct 2024 06:49 )   PT: 120.3 sec;   INR: 10.62 ratio         PTT - ( 29 Oct 2024 06:49 )  PTT:31.9 sec      IMAGING: HPI:  84-year-old female history of A-fib on Eliquis, CHF on Lasix and Entresto, hypotension, 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  Collateral obtained from son and daughter in law who is her PCP. She has been unwell for the past week, lives above them on the second floor and at baseline is independent and ambulatory, but has been in bed and with poor PO intake over the past week. Per son had significant leg edema preventing her from walking, also w/ poor PO intake. Appeared to have shortness of breath. Symptoms started last Monday.  In the ED was in afib w/ RVR. Labs significant for WBC 49, tbili 2.0, alk phos 219, AST 1198, , Cr. 4.3 (baseline mid 2s), metabolic acidosis with lactate elevation to 5, VBG 7.23/37/15/51, proBNP 35k, Trop 78 --> 69, Ca 8.2. Abdominal US with gallbladder wall thickening.   S/p zosyn, lasix 40 mg IVP x2, metoprolol 25mg, lopressor IV 5mg x3, sodium bicarb 50 IVP, diltiazem 30mg.    (29 Oct 2024 05:17)      14 point ROS otherwise negative    PAST MEDICAL & SURGICAL HISTORY:  Thrombocytosis      Hypertension      Hyperlipidemia      Afib      CHF (congestive heart failure)      No significant past surgical history          Allergies    No Known Drug Allergies  eggs (Unknown)    Intolerances        MEDICATIONS  (STANDING):  furosemide   Injectable 40 milliGRAM(s) IV Push two times a day  phytonadione  IVPB 10 milliGRAM(s) IV Intermittent once  piperacillin/tazobactam IVPB.. 3.375 Gram(s) IV Intermittent every 12 hours    MEDICATIONS  (PRN):      FAMILY HISTORY:  No pertinent family history in first degree relatives        SOCIAL HISTORY: No EtOH, no tobacco    Height (cm): 149.9 (10-28 @ 14:44)  Weight (kg): 57.2 (10-28 @ 14:44)  BMI (kg/m2): 25.5 (10-28 @ 14:44)  BSA (m2): 1.52 (10-28 @ 14:44)    VITALS:   T(F): 97.5 (10-29-24 @ 08:50), Max: 99.5 (10-28-24 @ 16:30)  HR: 97 (10-29-24 @ 08:50)  BP: 116/70 (10-29-24 @ 08:50)  RR: 28 (10-29-24 @ 08:50)  SpO2: 97% (10-29-24 @ 08:50)  Wt(kg): --    PHYSICAL EXAM    GENERAL: NAD  CHEST/LUNG: Decreased bs bilaterally  HEART: Irregularly irregulr  ABDOMEN: Soft, Nontender, Nondistended  EXTREMITIES:  b/l LE edema+  PSYCH: calm now  NEUROLOGY: Arousable, able to say her first name only, does not engage in further conversation      LABS:                         8.7    x     )-----------( 161      ( 29 Oct 2024 06:49 )             28.2     10-29    137  |  99  |  106[H]  ----------------------------<  110[H]  4.1   |  16[L]  |  4.31[H]    Ca    8.2[L]      29 Oct 2024 06:49  Phos  4.6     10-29  Mg     2.10     10-29    TPro  5.5[L]  /  Alb  3.0[L]  /  TBili  2.0[H]  /  DBili  x   /  AST  615[H]  /  ALT  492[H]  /  AlkPhos  189[H]  10-29    Lactate Dehydrogenase, Serum: 843 U/L (10-29 @ 08:28)  Uric Acid: 21.6 mg/dL (10-29 @ 08:28)  Magnesium: 2.10 mg/dL (10-29 @ 06:49)  Phosphorus: 4.6 mg/dL (10-29 @ 06:49)  Magnesium: 2.30 mg/dL (10-28 @ 17:24)    PT/INR - ( 29 Oct 2024 06:49 )   PT: 120.3 sec;   INR: 10.62 ratio         PTT - ( 29 Oct 2024 06:49 )  PTT:31.9 sec      IMAGING:

## 2024-10-29 NOTE — CHART NOTE - NSCHARTNOTEFT_GEN_A_CORE
Notified by RN, recent labs w/ critical PT (120) and INR (10)  Hemoglobin noted to be downtrending (10.4->8.7), no obvious bleeding noted    Discussed w/ hematology, recommendations as follows:  - Send STAT T+S, repeat CBC, repeat coags, Fibrinogen  - Give Vitamin K 10 mg IV now

## 2024-10-29 NOTE — CONSULT NOTE ADULT - NS ATTEST RISK PROBLEM GEN_ALL_CORE FT
Patient has essential thrombocytosis and coagulopathy, which carries a risk for clotting, bleeding, and other life-threatening complications.

## 2024-10-29 NOTE — CONSULT NOTE ADULT - SUBJECTIVE AND OBJECTIVE BOX
Patient:  TRIP MAGALLANES  [id number]    CHIEF COMPLAINT: dyspnea, SOB, LE edema    HPI: 84 F hx of HF, Afib on Eliquis JAK2 mutation presenting with SOB, difficulty walking, fatigue, and increased leg swelling. MICU consulted for concern for patient stability i/s/o lab derangement. Interview limited by pt mental status (only answering yes/no to questions but following commands appropriately), unable to reach family. Per chart review, pt had complaints of dyspnea, SOB, LE edema per family.  In ED, VSS w/ some tachycardia (afib) to 110s at time of my evaluation and no hypoxia or hypotension. Labs significant for leukocytosis of ~25k -> 50k, elevated BUN/Cr ~90/4.3. VBG w/ pH 7.23, pCO2 37, pO2 37, lactate 5.0, improved after bicarb. In ED, given lopressor 5 x3 IV, lopressor 25mg PO x1, 50 mEq bicarb x1.    PAST MEDICAL & SURGICAL HISTORY:  Thrombocytosis      Hypertension      Hyperlipidemia          FAMILY HISTORY:      SOCIAL HISTORY:    Allergies    No Known Allergies    Intolerances        HOME MEDICATIONS:    REVIEW OF SYSTEMS:  [ ] Unable to assess ROS because ______  [ ] Negative except as stated in HPI  CONSTITUTIONAL: No fever, chills, night sweats, or fatigue  EYES: No eye pain, visual disturbances, or discharge  ENMT:  No difficulty hearing, tinnitus, vertigo; No sinus or throat pain  NECK: No pain or stiffness  BREASTS: No pain, masses, or nipple discharge  RESPIRATORY: No cough, wheezing, or hemoptysis; No shortness of breath  CARDIOVASCULAR: No chest pain, palpitations, dizziness, or leg swelling  GASTROINTESTINAL: No abdominal or epigastric pain. No nausea, vomiting, or hematemesis; No diarrhea or constipation. No melena or hematochezia.  GENITOURINARY: No dysuria, frequency, hematuria, or incontinence  NEUROLOGICAL: No headaches, memory loss, loss of strength, numbness, or tremors  SKIN: No itching, burning, rashes, or lesions   LYMPH NODES: No enlarged glands  ENDOCRINE: No heat or cold intolerance; No hair loss  MUSCULOSKELETAL: No joint pain or swelling; No muscle, back, or extremity pain  PSYCHIATRIC: No depression, anxiety, mood swings, or difficulty sleeping  HEME/LYMPH: No easy bruising, or bleeding gums  ALLERGY AND IMMUNOLOGIC: No hives or eczema    OBJECTIVE:  T(F): 98.1 (10-29-24 @ 00:10), Max: 99.5 (10-28-24 @ 16:30)  HR: 85 (10-29-24 @ 02:48) (76 - 122)  BP: 104/61 (10-29-24 @ 02:48) (100/58 - 142/74)  BP(mean): 75 (10-29-24 @ 02:48) (75 - 75)  ABP: --  ABP(mean): --  RR: 18 (10-29-24 @ 02:48) (17 - 32)  SpO2: 100% (10-29-24 @ 02:48) (96% - 100%)  CVP(mm Hg): --    I/O Summary 24H    CAPILLARY BLOOD GLUCOSE      POCT Blood Glucose.: 96 mg/dL (28 Oct 2024 14:48)      PHYSICAL EXAM:  GENERAL: NAD, lying in bed comfortably  HEAD:  Atraumatic, Normocephalic  EYES: EOMI, PERRLA, conjunctiva and sclera clear  ENT: Moist mucous membranes  NECK: Supple, No JVD  CHEST/LUNG: Clear to auscultation bilaterally; No rales, rhonchi, wheezing, or rubs. Unlabored respirations  HEART: Regular rate and rhythm; No murmurs, rubs, or gallops  ABDOMEN: Bowel sounds present; Soft, Nontender, Nondistended. No hepatomegaly  EXTREMITIES:  2+ Peripheral Pulses, brisk capillary refill. No clubbing, cyanosis, or edema  NERVOUS SYSTEM:  Alert & Oriented X3, speech clear. No deficits   MSK: FROM all 4 extremities, full and equal strength  SKIN: No rashes or lesions    HOSPITAL MEDICATIONS:  MEDICATIONS  (STANDING):    MEDICATIONS  (PRN):      LABS:  CBC 10-28-24 @ 17:24                        10.4   49.74 )-----------( 188                   33.1     Hgb trend: 10.4 <--   WBC trend: 49.74 <--     CMP 10-28-24 @ 20:19    136  |  95[L]  |  98[H]  ----------------------------<  105[H]  4.0   |  15[L]  |  4.28[H]    Ca    8.2[L]      10-28-24 @ 20:19  Mg     2.30     10-28    TPro  5.9[L]  /  Alb  3.3  /  TBili  2.0[H]  /  DBili  x   /  AST  1198[H]  /  ALT  663[H]  /  AlkPhos  219[H]     10-28    Serum Cr (eGFR) trend: 4.28 (10) <-- , 4.33 (10) <--     PT/INR - ( 28 Oct 2024 23:18 )   PT: 122.1 sec;   INR: 10.50 ratio    PTT - ( 28 Oct 2024 23:18 ):36.2 sec    ABG Trend:     VBG Trend:   10-28-24 @ 20:19 - pH: 7.32  | pCO2: 32    | pO2: 41    | HCO3: 16    | Lactate: 4.5    10-28-24 @ 17:24 - pH: 7.23  | pCO2: 37    | pO2: 37    | HCO3: 16    | Lactate: 5.0        MICROBIOLOGY:       RADIOLOGY:  [ ] Reviewed and interpreted by me    EKG

## 2024-10-29 NOTE — H&P ADULT - PROBLEM SELECTOR PLAN 3
-C/f CHF exacerbation given dyspnea and edema, with liver and renal failure c/f hepatorenal or cardiorenal from congestion  -CXR appears negative for pulmonary edema or consolidations but does have crackles on exam and pitting edema; not hypoxic   -S/p lasix 40mg x2  -C/w lasix IV 40 mg BID   [] TTE in AM

## 2024-10-29 NOTE — CONSULT NOTE ADULT - PROBLEM SELECTOR RECOMMENDATION 9
acutely altered, sxs began around 10/23; at baseline independent in ADLs and iADLS, walks w/o assistance, verbal and conversant   unclear etiology suspect multifactorial metabolic encephalopathy iso multi organ failure (cardiac, hepatic, renal, possibly infectious etiology/sepsis, w/ component of medication induced).   CTH unremarkable   Appreciate input from cardio, EP, nephro, ID, heme/onc.   Work up per primary team.

## 2024-10-29 NOTE — CONSULT NOTE ADULT - SUBJECTIVE AND OBJECTIVE BOX
Consult received. Full note to follow.     Tom Kirk M.D.  [facility], Division of Infectious Diseases  [phone]  After 5pm on weekdays and all day on weekends - please call [phone]  [facility], Division of Infectious Diseases  JOSE Carter S. Shah, Y. Patel, G. 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 Per notes she has been unwell for the past week. Has had poor PO intake and family noted b/l leg edema w/ SOB. In the ED was noted to have afib w/ RVR. Labs significant for WBC 49, tbili 2.0, alk phos 219, AST 1198, , Cr. 4.3 (baseline mid 2s). Abdominal US showed gallbladder wall thickening. MICU and surgery were consulted. Patient was started on diuretics by cardiology.  Patient unable to provide history therefore history obtained from chart review.     ROS: unable to assess 2/2 patient's mentation     Allergies: No Known Drug Allergies  eggs (Unknown)    PMH -- Thrombocytosis    Hypertension    Hyperlipidemia    Afib    CHF (congestive heart failure)      PSH -- No significant past surgical history      FH -- No pertinent family history in first degree relatives      Social History -- unable to assess 2/2 patient's mentation     Physical Exam--  Vital Signs Last 24 Hrs  T(F): 97.6 (29 Oct 2024 12:44), Max: 99.5 (28 Oct 2024 16:30)  HR: 98 (29 Oct 2024 12:44) (76 - 122)  BP: 124/55 (29 Oct 2024 12:44) (100/58 - 142/74)  RR: 27 (29 Oct 2024 12:44) (17 - 32)  SpO2: 99% (29 Oct 2024 12:44) (96% - 100%)  General: nontoxic-appearing, no acute distress  HEENT: anicteric  Lungs: bibasilar crackles  Heart: S1, S2, normal rate.  Abdomen: Soft. Nondistended. Nontender.   Neuro: A&Ox1  Back: No costovertebral angle tenderness.  Extremities: b/l LE edema.   Skin: Warm. Dry. No rash.  Psychiatric: flat affect    Laboratory & Imaging Data--  CBC:                       8.7    46.85 )-----------( 209      ( 29 Oct 2024 10:12 )             28.6     WBC Count: 46.85 K/uL (10-29-24 @ 10:12)  WBC Count: 47.70 K/uL (10-29-24 @ 06:49)  WBC Count: 49.74 K/uL (10-28-24 @ 17:24)    CMP: 10-29    137  |  99  |  106[H]  ----------------------------<  110[H]  4.1   |  16[L]  |  4.31[H]    Ca    8.2[L]      29 Oct 2024 06:49  Phos  4.6     10-29  Mg     2.10     10-29    TPro  5.5[L]  /  Alb  3.0[L]  /  TBili  2.0[H]  /  DBili  x   /  AST  615[H]  /  ALT  492[H]  /  AlkPhos  189[H]  10-29    LIVER FUNCTIONS - ( 29 Oct 2024 06:49 )  Alb: 3.0 g/dL / Pro: 5.5 g/dL / ALK PHOS: 189 U/L / ALT: 492 U/L / AST: 615 U/L / GGT: x           Urinalysis Basic - ( 29 Oct 2024 06:49 )    Color: x / Appearance: x / SG: x / pH: x  Gluc: 110 mg/dL / Ketone: x  / Bili: x / Urobili: x   Blood: x / Protein: x / Nitrite: x   Leuk Esterase: x / RBC: x / WBC x   Sq Epi: x / Non Sq Epi: x / Bacteria: x      Microbiology:       Radiology--  ***  Active Medications--  allopurinol 100 milliGRAM(s) Oral daily  furosemide   Injectable 80 milliGRAM(s) IV Push two times a day  metoprolol tartrate Injectable 2.5 milliGRAM(s) IV Push every 6 hours  piperacillin/tazobactam IVPB.. 3.375 Gram(s) IV Intermittent every 12 hours  rasburicase IVPB 6 milliGRAM(s) IV Intermittent once  sodium bicarbonate  Infusion 0.262 mEq/kG/Hr IV Continuous <Continuous>    Antimicrobials:   piperacillin/tazobactam IVPB.. 3.375 Gram(s) IV Intermittent every 12 hours    Immunologic:

## 2024-10-29 NOTE — CONSULT NOTE ADULT - SUBJECTIVE AND OBJECTIVE BOX
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  Collateral obtained from son and daughter in law who is her PCP. She has been unwell for the past week, lives above them on the second floor and at baseline is independent and ambulatory, but has been in bed and with poor PO intake over the past week. Per son had significant leg edema preventing her from walking, also w/ poor PO intake. Appeared to have shortness of breath. Symptoms started last Monday.  In the ED was in afib w/ RVR. Labs significant for WBC 49, tbili 2.0, alk phos 219, AST 1198, , Cr. 4.3 (baseline mid 2s), metabolic acidosis with lactate elevation to 5, VBG 7.23/37/15/51, proBNP 35k, Trop 78 --> 69, Ca 8.2. Abdominal US with gallbladder wall thickening.   S/p zosyn, lasix 40 mg IVP x2, metoprolol 25mg, lopressor IV 5mg x3, sodium bicarb 50 IVP, diltiazem 30mg.    (29 Oct 2024 05:17)    PERTINENT PM/SXH:   Thrombocytosis  Hypertension  Hyperlipidemia  Afib  CHF (congestive heart failure)    No significant past surgical history    FAMILY HISTORY:  No pertinent family history in first degree relatives    ------------------------------------------------------------------------------------------------------------  ITEMS NOT CHECKED ARE NOT PRESENT    SOCIAL HISTORY:   Living Situation: [x ]Home  [ ]Long term care  [ ]Rehab [ ]Other  Support: Lives in apartment above son and daughter  Ambulatory and independent at baseline    Substance hx:  [ ]   Tobacco hx:  [ ]   Alcohol hx: [ ]   Family Hx substance abuse [ ]yes [x ]no    Muslim/Spirituality:  PCSSQ[Palliative Care Spiritual Screening Question]   Severity (0-10): 0  Score of 4 or > indicate consideration of Chaplaincy referral.  Chaplaincy Referral: [ ] yes [X ] refused [ ] following    Anticipatory Grief present?:  [ ] yes [X ] no  [ ] Deferred                      Other Referrals:    [ ]Hospice   [ ]Caregiver [place] Support  [ ]Child Life    [ ]Patient & Family Centered Care Referral  [ ]Holistic Therapy     ------------------------------------------------------------------------------------------------------------    PRESENT SYMPTOMS:     [x ] Unable to self-report      [ ] PAINADS     [ ] RDOS    [ ] No     [ ] Yes     Source if other than patient:  [ ]Family   [ ]Team     PAIN:   If blank, patient unable to specify     [ ]yes [ ]no    1. Location-   2. Radiation-    3. Quality-   4. Timing-   5. Minimal acceptable level/pain goal-   6. Aggravating factors-   7. QOL impact-     SYMPTOMS:   Dyspnea:                           [ ]Mild [ ]Moderate [ ]Severe  Anxiety:                             [ ]Mild [ ]Moderate [ ]Severe  Fatigue:                             [ ]Mild [ ]Moderate [ ]Severe  Nausea/Vomiting:              [ ]Mild [ ]Moderate [ ]Severe  Loss of appetite:                [ ]Mild [ ]Moderate [ ]Severe  Constipation:                     [ ]Mild [ ]Moderate [ ]Severe    Other Symptoms:  [X ]All other review of systems negative     ------------------------------------------------------------------------------------------------------------      I-Stop Reference No:     ------------------------------------------------------------------------------------------------------------    FUNCTIONAL STATUS:     Baseline ADL (prior to admission):  [x ] Independent  [ ] Moderate Assistance [ ] Dependent    Palliative Performance Score (current):     [x ]PPSV2 < or = to 30%   [ ]artificial nutrition      NUTRITIONAL STATUS:     Protein Calorie Malnutrition Present:   [ ]mild   [ ]moderate or severe    Weight:   [ ]underweight (BMI 18.5 or less)   [ ]morbid obesity (BMI 30 or higher)   [ ]anasarca  [ ]significant weight loss     Height (cm): 149.9 (10-28-24 @ 14:44), 155 (06-17-24 @ 15:00)  Weight (kg): 57.2 (10-28-24 @ 14:44), 56 (10-14-24 @ 16:00)  BMI (kg/m2): 25.5 (10-28-24 @ 14:44), 23.3 (10-14-24 @ 16:00)    ------------------------------------------------------------------------------------------------------------    PRIOR ADVANCE DIRECTIVES:    [ ] DNR/MOLST    [ ] Living Will    [ ] Health Care Proxy(s)    DECISION MAKER(s):  [ ] Patient    [ x] Surrogate(s) - children (son and daughter) Vladimir   [ ] HCP   [ ] Guardian             ------------------------------------------------------------------------------------------------------------  PHYSICAL EXAM:  Vital Signs Last 24 Hrs  T(C): 36.4 (29 Oct 2024 12:44), Max: 37.5 (28 Oct 2024 16:30)  T(F): 97.6 (29 Oct 2024 12:44), Max: 99.5 (28 Oct 2024 16:30)  HR: 98 (29 Oct 2024 12:44) (76 - 122)  BP: 124/55 (29 Oct 2024 12:44) (100/58 - 142/74)  BP(mean): 75 (29 Oct 2024 02:48) (75 - 75)  RR: 27 (29 Oct 2024 12:44) (17 - 32)  SpO2: 99% (29 Oct 2024 12:44) (96% - 100%)    Parameters below as of 29 Oct 2024 12:44  Patient On (Oxygen Delivery Method): room air     I&O's Summary      GENERAL:  [ ]Cachexia  [ ] Frail  [ ]Awake  [ ]Oriented   [x ]Lethargic  [x ]Unarousable  [ ]Verbal  [ ]Non-Verbal    BEHAVIORAL:   [ ] Anxiety  [ ] Delirium [ ] Agitation [ ] Other    HEENT:   [x ]Normal   [ ]Dry mouth   [ ]ET Tube/Trach  [ ]Oral lesions    PULMONARY:   [x ]Clear              [ ]Tachypnea  [ ]Audible excessive secretions   [ ]Rhonchi        [ ]Right [ ]Left [ ]Bilateral  [ ]Crackles        [ ]Right [ ]Left [ ]Bilateral  [ ]Wheezing     [ ]Right [ ]Left [ ]Bilateral  [ ]Diminished breath sounds [ ]right [ ]left [ ]bilateral    CARDIOVASCULAR:    [x ]Regular [ ]Irregular [ ]Tachy  [ ]Matt [ ]Murmur [ ]Other    GASTROINTESTINAL:  [x ]Soft  [ ]Distended   [ ]+BS  [ ]Non tender [ ]Tender  [ ]Other [ ]PEG [ ]OGT/ NGT      GENITOURINARY:  [ ]Normal [ ] Incontinent   [ ]Oliguria/Anuria   [x ]Owusu - yellow urine     MUSCULOSKELETAL:   [ ]Normal   [ ]Weakness  [x ]Bed/Wheelchair bound [ ]Edema    NEUROLOGIC:   [x ]No focal deficits  [ ]Cognitive impairment  [ ]Dysphagia [ ]Dysarthria [ ]Paresis [ ]Other     SKIN:   [x ]Normal  [ ]Rash  [ ]Other  [ ]Pressure ulcer(s)       Present on admission [ ]y [ ]n    ------------------------------------------------------------------------------------------------------------    LABS:                        8.7    46.85 )-----------( 209      ( 29 Oct 2024 10:12 )             28.6   10-29    137  |  99  |  106[H]  ----------------------------<  110[H]  4.1   |  16[L]  |  4.31[H]    Ca    8.2[L]      29 Oct 2024 06:49  Phos  4.6     10-29  Mg     2.10     10-29    TPro  5.5[L]  /  Alb  3.0[L]  /  TBili  2.0[H]  /  DBili  x   /  AST  615[H]  /  ALT  492[H]  /  AlkPhos  189[H]  10-29  PT/INR - ( 29 Oct 2024 06:49 )   PT: 120.3 sec;   INR: 10.62 ratio         PTT - ( 29 Oct 2024 06:49 )  PTT:31.9 sec    Urinalysis Basic - ( 29 Oct 2024 06:49 )    Color: x / Appearance: x / SG: x / pH: x  Gluc: 110 mg/dL / Ketone: x  / Bili: x / Urobili: x   Blood: x / Protein: x / Nitrite: x   Leuk Esterase: x / RBC: x / WBC x   Sq Epi: x / Non Sq Epi: x / Bacteria: x        ------------------------------------------------------------------------------------------------------------    CRITICAL CARE:  [ ]Shock Present  [ ]Septic [ ]Cardiogenic [ ]Neurologic [ ]Hypovolemic [ ] Undifferentiated/Mixed   [ ]Vasopressors [ ]Inotropes     [ ]Respiratory failure present: [ ]Acute  [ ]Chronic [ ]Hypoxic  [ ]Hypercarbic   [ ]Mechanical ventilation   [ ]Trach collar   [ ]Non-invasive ventilatory support   [ ]High flow    [ ]Non-rebreather/Venti     [ ]Other organ failure     ------------------------------------------------------------------------------------------------------------    RADIOLOGY & ADDITIONAL STUDIES:    < from: CT Head No Cont (10.29.24 @ 09:51) >  IMPRESSION:  No acute pathology.  < end of copied text >    < from: CT Chest No Cont (10.29.24 @ 09:53) >  IMPRESSION:  *  Bilateral pulmonary groundglass opacity consistent with edema versus   pneumonia.  *  Trace bilateral pleural effusions, ascites, and anasarca.  *  No evidence of acute infectious process in the abdomen and pelvis.  *  Bilateral renal cysts and right renal atrophy. No hydronephrosis.  < end of copied text >    < from: CT Abdomen and Pelvis No Cont (10.29.24 @ 10:01) >  IMPRESSION:  *  Bilateral pulmonary groundglass opacity consistent with edema versus   pneumonia.  *  Trace bilateral pleural effusions, ascites, and anasarca.  *  No evidence of acute infectious process in the abdomen and pelvis.  *  Bilateral renal cysts and right renal atrophy. No hydronephrosis.  < end of copied text >    < from: Xray Chest 1 View- PORTABLE-Urgent (10.28.24 @ 23:37) >  IMPRESSION:  Clear lungs.  < end of copied text >    < from: US Abdomen Complete (10.28.24 @ 21:14) >  IMPRESSION:  1.9 cm gallstone. Mild gallbladder wall thickening measuring up to 4 mm.   No pericholecystic fluid. There is no pericholecystic fluid and there is   no sonographic Joyce sign. Findings are equivocal for cholecystitis.  Atrophic right kidney. Multiple renal cysts. A 1.9 x 1.8 x 2.0 cm mid to   upper pole right renal cyst contains mildly thick septations. Recommend   six-month follow-up.  Small bilateral pleural effusions. Trace perihepatic ascites.  < end of copied text >    < from: US Duplex Venous Lower Ext Ltd, Left (10.28.24 @ 18:09) >  IMPRESSION:  No evidence of left lower extremity deep venous thrombosis.  LEFT calf edema.  < end of copied text >  ------------------------------------------------------------------------------------------------------------    ALLERGIES:  Allergies  No Known Drug Allergies  eggs (Unknown)  Intolerances    MEDICATIONS  (STANDING):  allopurinol 100 milliGRAM(s) Oral daily  furosemide   Injectable 80 milliGRAM(s) IV Push two times a day  metoprolol tartrate Injectable 2.5 milliGRAM(s) IV Push every 6 hours  piperacillin/tazobactam IVPB.. 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  Collateral obtained from son and daughter in law who is her PCP. She has been unwell for the past week, lives above them on the second floor and at baseline is independent and ambulatory, but has been in bed and with poor PO intake over the past week. Per son had significant leg edema preventing her from walking, also w/ poor PO intake. Appeared to have shortness of breath. Symptoms started last Monday.  In the ED was in afib w/ RVR. Labs significant for WBC 49, tbili 2.0, alk phos 219, AST 1198, , Cr. 4.3 (baseline mid 2s), metabolic acidosis with lactate elevation to 5, VBG 7.23/37/15/51, proBNP 35k, Trop 78 --> 69, Ca 8.2. Abdominal US with gallbladder wall thickening.   S/p zosyn, lasix 40 mg IVP x2, metoprolol 25mg, lopressor IV 5mg x3, sodium bicarb 50 IVP, diltiazem 30mg.    (29 Oct 2024 05:17)    PERTINENT PM/SXH:   Thrombocytosis  Hypertension  Hyperlipidemia  Afib  CHF (congestive heart failure)    No significant past surgical history    FAMILY HISTORY:  No pertinent family history in first degree relatives    ------------------------------------------------------------------------------------------------------------  ITEMS NOT CHECKED ARE NOT PRESENT    SOCIAL HISTORY:   Living Situation: [x ]Home  [ ]Long term care  [ ]Rehab [ ]Other  Support: Lives in apartment above son and daughter  Ambulatory and independent at baseline    Substance hx:  [ ]   Tobacco hx:  [ ]   Alcohol hx: [ ]   Family Hx substance abuse [ ]yes [x ]no    Hinduism/Spirituality:  PCSSQ[Palliative Care Spiritual Screening Question]   Severity (0-10): 0  Score of 4 or > indicate consideration of Chaplaincy referral.  Chaplaincy Referral: [ ] yes [X ] refused [ ] following    Anticipatory Grief present?:  [ ] yes [X ] no  [ ] Deferred                      Other Referrals:    [ ]Hospice   [ ]Caregiver [place] Support  [ ]Child Life    [ ]Patient & Family Centered Care Referral  [ ]Holistic Therapy     ------------------------------------------------------------------------------------------------------------    PRESENT SYMPTOMS:     [x ] Unable to self-report      [ ] PAINADS     [ ] RDOS    [ ] No     [ ] Yes     Source if other than patient:  [ ]Family   [ ]Team     PAIN:   If blank, patient unable to specify     [ ]yes [ ]no    1. Location-   2. Radiation-    3. Quality-   4. Timing-   5. Minimal acceptable level/pain goal-   6. Aggravating factors-   7. QOL impact-     SYMPTOMS:   Dyspnea:                           [ ]Mild [ ]Moderate [ ]Severe  Anxiety:                             [ ]Mild [ ]Moderate [ ]Severe  Fatigue:                             [ ]Mild [ ]Moderate [ ]Severe  Nausea/Vomiting:              [ ]Mild [ ]Moderate [ ]Severe  Loss of appetite:                [ ]Mild [ ]Moderate [ ]Severe  Constipation:                     [ ]Mild [ ]Moderate [ ]Severe    Other Symptoms:  [X ]All other review of systems negative     ------------------------------------------------------------------------------------------------------------      I-Stop Reference No:     ------------------------------------------------------------------------------------------------------------    FUNCTIONAL STATUS:     Baseline ADL (prior to admission):  [x ] Independent  [ ] Moderate Assistance [ ] Dependent    Palliative Performance Score (current):     [x ]PPSV2 < or = to 30%   [ ]artificial nutrition      NUTRITIONAL STATUS:     Protein Calorie Malnutrition Present:   [ ]mild   [ ]moderate or severe    Weight:   [ ]underweight (BMI 18.5 or less)   [ ]morbid obesity (BMI 30 or higher)   [ ]anasarca  [ ]significant weight loss     Height (cm): 149.9 (10-28-24 @ 14:44), 155 (06-17-24 @ 15:00)  Weight (kg): 57.2 (10-28-24 @ 14:44), 56 (10-14-24 @ 16:00)  BMI (kg/m2): 25.5 (10-28-24 @ 14:44), 23.3 (10-14-24 @ 16:00)    ------------------------------------------------------------------------------------------------------------    PRIOR ADVANCE DIRECTIVES:    [ ] DNR/MOLST    [ ] Living Will    [ ] Health Care Proxy(s)    DECISION MAKER(s):  [ ] Patient    [ x] Surrogate(s) - Adult children - 3   [ ] HCP   [ ] Guardian             ------------------------------------------------------------------------------------------------------------  PHYSICAL EXAM:  Vital Signs Last 24 Hrs  T(C): 36.4 (29 Oct 2024 12:44), Max: 37.5 (28 Oct 2024 16:30)  T(F): 97.6 (29 Oct 2024 12:44), Max: 99.5 (28 Oct 2024 16:30)  HR: 98 (29 Oct 2024 12:44) (76 - 122)  BP: 124/55 (29 Oct 2024 12:44) (100/58 - 142/74)  BP(mean): 75 (29 Oct 2024 02:48) (75 - 75)  RR: 27 (29 Oct 2024 12:44) (17 - 32)  SpO2: 99% (29 Oct 2024 12:44) (96% - 100%)    Parameters below as of 29 Oct 2024 12:44  Patient On (Oxygen Delivery Method): room air     I&O's Summary      GENERAL:  [ ]Cachexia  [ ] Frail  [ ]Awake  [ ]Oriented   [x ]Lethargic  [x ]Unarousable  [ ]Verbal  [ ]Non-Verbal    BEHAVIORAL:   [ ] Anxiety  [ ] Delirium [ ] Agitation [ ] Other    HEENT:   [x ]Normal   [ ]Dry mouth   [ ]ET Tube/Trach  [ ]Oral lesions    PULMONARY:   [x ]Clear              [ ]Tachypnea  [ ]Audible excessive secretions   [ ]Rhonchi        [ ]Right [ ]Left [ ]Bilateral  [ ]Crackles        [ ]Right [ ]Left [ ]Bilateral  [ ]Wheezing     [ ]Right [ ]Left [ ]Bilateral  [ ]Diminished breath sounds [ ]right [ ]left [ ]bilateral    CARDIOVASCULAR:    [x ]Regular [ ]Irregular [ ]Tachy  [ ]Matt [ ]Murmur [ ]Other    GASTROINTESTINAL:  [x ]Soft  [ ]Distended   [ ]+BS  [ ]Non tender [ ]Tender  [ ]Other [ ]PEG [ ]OGT/ NGT      GENITOURINARY:  [ ]Normal [ ] Incontinent   [ ]Oliguria/Anuria   [x ]Owusu - yellow urine     MUSCULOSKELETAL:   [ ]Normal   [ ]Weakness  [x ]Bed/Wheelchair bound [ ]Edema    NEUROLOGIC:   [x ]No focal deficits  [ ]Cognitive impairment  [ ]Dysphagia [ ]Dysarthria [ ]Paresis [ ]Other     SKIN:   [x ]Normal  [ ]Rash  [ ]Other  [ ]Pressure ulcer(s)       Present on admission [ ]y [ ]n    ------------------------------------------------------------------------------------------------------------    LABS:                        8.7    46.85 )-----------( 209      ( 29 Oct 2024 10:12 )             28.6   10-29    137  |  99  |  106[H]  ----------------------------<  110[H]  4.1   |  16[L]  |  4.31[H]    Ca    8.2[L]      29 Oct 2024 06:49  Phos  4.6     10-29  Mg     2.10     10-29    TPro  5.5[L]  /  Alb  3.0[L]  /  TBili  2.0[H]  /  DBili  x   /  AST  615[H]  /  ALT  492[H]  /  AlkPhos  189[H]  10-29  PT/INR - ( 29 Oct 2024 06:49 )   PT: 120.3 sec;   INR: 10.62 ratio         PTT - ( 29 Oct 2024 06:49 )  PTT:31.9 sec    Urinalysis Basic - ( 29 Oct 2024 06:49 )    Color: x / Appearance: x / SG: x / pH: x  Gluc: 110 mg/dL / Ketone: x  / Bili: x / Urobili: x   Blood: x / Protein: x / Nitrite: x   Leuk Esterase: x / RBC: x / WBC x   Sq Epi: x / Non Sq Epi: x / Bacteria: x        ------------------------------------------------------------------------------------------------------------    CRITICAL CARE:  [ ]Shock Present  [ ]Septic [ ]Cardiogenic [ ]Neurologic [ ]Hypovolemic [ ] Undifferentiated/Mixed   [ ]Vasopressors [ ]Inotropes     [ ]Respiratory failure present: [ ]Acute  [ ]Chronic [ ]Hypoxic  [ ]Hypercarbic   [ ]Mechanical ventilation   [ ]Trach collar   [ ]Non-invasive ventilatory support   [ ]High flow    [ ]Non-rebreather/Venti     [ ]Other organ failure     ------------------------------------------------------------------------------------------------------------    RADIOLOGY & ADDITIONAL STUDIES:    < from: CT Head No Cont (10.29.24 @ 09:51) >  IMPRESSION:  No acute pathology.  < end of copied text >    < from: CT Chest No Cont (10.29.24 @ 09:53) >  IMPRESSION:  *  Bilateral pulmonary groundglass opacity consistent with edema versus   pneumonia.  *  Trace bilateral pleural effusions, ascites, and anasarca.  *  No evidence of acute infectious process in the abdomen and pelvis.  *  Bilateral renal cysts and right renal atrophy. No hydronephrosis.  < end of copied text >    < from: CT Abdomen and Pelvis No Cont (10.29.24 @ 10:01) >  IMPRESSION:  *  Bilateral pulmonary groundglass opacity consistent with edema versus   pneumonia.  *  Trace bilateral pleural effusions, ascites, and anasarca.  *  No evidence of acute infectious process in the abdomen and pelvis.  *  Bilateral renal cysts and right renal atrophy. No hydronephrosis.  < end of copied text >    < from: Xray Chest 1 View- PORTABLE-Urgent (10.28.24 @ 23:37) >  IMPRESSION:  Clear lungs.  < end of copied text >    < from: US Abdomen Complete (10.28.24 @ 21:14) >  IMPRESSION:  1.9 cm gallstone. Mild gallbladder wall thickening measuring up to 4 mm.   No pericholecystic fluid. There is no pericholecystic fluid and there is   no sonographic Joyce sign. Findings are equivocal for cholecystitis.  Atrophic right kidney. Multiple renal cysts. A 1.9 x 1.8 x 2.0 cm mid to   upper pole right renal cyst contains mildly thick septations. Recommend   six-month follow-up.  Small bilateral pleural effusions. Trace perihepatic ascites.  < end of copied text >    < from: US Duplex Venous Lower Ext Ltd, Left (10.28.24 @ 18:09) >  IMPRESSION:  No evidence of left lower extremity deep venous thrombosis.  LEFT calf edema.  < end of copied text >  ------------------------------------------------------------------------------------------------------------    ALLERGIES:  Allergies  No Known Drug Allergies  eggs (Unknown)  Intolerances    MEDICATIONS  (STANDING):  allopurinol 100 milliGRAM(s) Oral daily  furosemide   Injectable 80 milliGRAM(s) IV Push two times a day  metoprolol tartrate Injectable 2.5 milliGRAM(s) IV Push every 6 hours  piperacillin/tazobactam IVPB.. 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 Collateral obtained from son and daughter in law who is her PCP. She has been unwell for the past week, lives above them on the second floor and at baseline is independent and ambulatory, but has been in bed and with poor PO intake over the past week. Per son had significant leg edema preventing her from walking, also w/ poor PO intake. Appeared to have shortness of breath. Symptoms started last Monday. In the ED was in afib w/ RVR. Labs significant for WBC 49, tbili 2.0, alk phos 219, AST 1198, , Cr. 4.3 (baseline mid 2s), metabolic acidosis with lactate elevation to 5, VBG 7.23/37/15/51, proBNP 35k, Trop 78 --> 69, Ca 8.2. Abdominal US with gallbladder wall thickening. S/p zosyn, lasix 40 mg IVP x2, metoprolol 25mg, lopressor IV 5mg x3, sodium bicarb 50 IVP, diltiazem 30mg.      PERTINENT PM/SXH:   Thrombocytosis  Hypertension  Hyperlipidemia  Afib  CHF (congestive heart failure)    No significant past surgical history    FAMILY HISTORY:  No pertinent family history in first degree relatives    ------------------------------------------------------------------------------------------------------------  ITEMS NOT CHECKED ARE NOT PRESENT    SOCIAL HISTORY:   Living Situation: [x ]Home  [ ]Long term care  [ ]Rehab [ ]Other  Support: Lives in apartment above son and daughter  Ambulatory and independent at baseline    Substance hx:  [ ]   Tobacco hx:  [ ]   Alcohol hx: [ ]   Family Hx substance abuse [ ]yes [x ]no    Anabaptism/Spirituality:  PCSSQ[Palliative Care Spiritual Screening Question]   Severity (0-10): 0  Score of 4 or > indicate consideration of Chaplaincy referral.  Chaplaincy Referral: [ ] yes [X ] refused [ ] following    Anticipatory Grief present?:  [ ] yes [X ] no  [ ] Deferred                      Other Referrals:    [ ]Hospice   [ ]Caregiver [place] Support  [ ]Child Life    [ ]Patient & Family Centered Care Referral  [ ]Holistic Therapy     ------------------------------------------------------------------------------------------------------------    PRESENT SYMPTOMS:     [x ] Unable to self-report      [ ] PAINADS     [ ] RDOS    [ ] No     [ ] Yes     Source if other than patient:  [ ]Family   [ ]Team     PAIN:   If blank, patient unable to specify     [ ]yes [ ]no    1. Location-   2. Radiation-    3. Quality-   4. Timing-   5. Minimal acceptable level/pain goal-   6. Aggravating factors-   7. QOL impact-     SYMPTOMS:   Dyspnea:                           [ ]Mild [ ]Moderate [ ]Severe  Anxiety:                             [ ]Mild [ ]Moderate [ ]Severe  Fatigue:                             [ ]Mild [ ]Moderate [ ]Severe  Nausea/Vomiting:              [ ]Mild [ ]Moderate [ ]Severe  Loss of appetite:                [ ]Mild [ ]Moderate [ ]Severe  Constipation:                     [ ]Mild [ ]Moderate [ ]Severe    Other Symptoms:  [X ]All other review of systems negative     ------------------------------------------------------------------------------------------------------------      I-Stop Reference No: [id number]    ------------------------------------------------------------------------------------------------------------    FUNCTIONAL STATUS:     Baseline ADL (prior to admission):  [x ] Independent  [ ] Moderate Assistance [ ] Dependent    Palliative Performance Score (current):     [x ]PPSV2 < or = to 30%   [ ]artificial nutrition      NUTRITIONAL STATUS:     Protein Calorie Malnutrition Present:   [ ]mild   [ ]moderate or severe    Weight:   [ ]underweight (BMI 18.5 or less)   [ ]morbid obesity (BMI 30 or higher)   [ ]anasarca  [ ]significant weight loss     Height (cm): 149.9 (10-28-24 @ 14:44), 155 (06-17-24 @ 15:00)  Weight (kg): 57.2 (10-28-24 @ 14:44), 56 (10-14-24 @ 16:00)  BMI (kg/m2): 25.5 (10-28-24 @ 14:44), 23.3 (10-14-24 @ 16:00)    ------------------------------------------------------------------------------------------------------------    PRIOR ADVANCE DIRECTIVES:    [ ] DNR/MOLST    [ ] Living Will    [ ] Health Care Proxy(s)    DECISION MAKER(s):  [ ] Patient    [ x] Surrogate(s) - Adult children - 3   [ ] HCP   [ ] Guardian             ------------------------------------------------------------------------------------------------------------  PHYSICAL EXAM:  Vital Signs Last 24 Hrs  T(C): 36.4 (29 Oct 2024 12:44), Max: 37.5 (28 Oct 2024 16:30)  T(F): 97.6 (29 Oct 2024 12:44), Max: 99.5 (28 Oct 2024 16:30)  HR: 98 (29 Oct 2024 12:44) (76 - 122)  BP: 124/55 (29 Oct 2024 12:44) (100/58 - 142/74)  BP(mean): 75 (29 Oct 2024 02:48) (75 - 75)  RR: 27 (29 Oct 2024 12:44) (17 - 32)  SpO2: 99% (29 Oct 2024 12:44) (96% - 100%)    Parameters below as of 29 Oct 2024 12:44  Patient On (Oxygen Delivery Method): room air     I&O's Summary      GENERAL:  [ ]Cachexia  [ ] Frail  [ ]Awake  [ ]Oriented   [x ]Lethargic  [x ]Unarousable  [ ]Verbal  [ ]Non-Verbal    BEHAVIORAL:   [ ] Anxiety  [ ] Delirium [ ] Agitation [ ] Other    HEENT:   [x ]Normal   [ ]Dry mouth   [ ]ET Tube/Trach  [ ]Oral lesions    PULMONARY:   [x ]Clear              [ ]Tachypnea  [ ]Audible excessive secretions   [ ]Rhonchi        [ ]Right [ ]Left [ ]Bilateral  [ ]Crackles        [ ]Right [ ]Left [ ]Bilateral  [ ]Wheezing     [ ]Right [ ]Left [ ]Bilateral  [ ]Diminished breath sounds [ ]right [ ]left [ ]bilateral    CARDIOVASCULAR:    [x ]Regular [ ]Irregular [ ]Tachy  [ ]Matt [ ]Murmur [ ]Other    GASTROINTESTINAL:  [x ]Soft  [ ]Distended   [ ]+BS  [ ]Non tender [ ]Tender  [ ]Other [ ]PEG [ ]OGT/ NGT      GENITOURINARY:  [ ]Normal [ ] Incontinent   [ ]Oliguria/Anuria   [x ]Owusu - yellow urine     MUSCULOSKELETAL:   [ ]Normal   [ ]Weakness  [x ]Bed/Wheelchair bound [ ]Edema    NEUROLOGIC:   [x ]No focal deficits  [ ]Cognitive impairment  [ ]Dysphagia [ ]Dysarthria [ ]Paresis [ ]Other     SKIN:   [x ]Normal  [ ]Rash  [ ]Other  [ ]Pressure ulcer(s)       Present on admission [ ]y [ ]n    ------------------------------------------------------------------------------------------------------------    LABS:                        8.7    46.85 )-----------( 209      ( 29 Oct 2024 10:12 )             28.6   10-29    137  |  99  |  106[H]  ----------------------------<  110[H]  4.1   |  16[L]  |  4.31[H]    Ca    8.2[L]      29 Oct 2024 06:49  Phos  4.6     10-29  Mg     2.10     10-29    TPro  5.5[L]  /  Alb  3.0[L]  /  TBili  2.0[H]  /  DBili  x   /  AST  615[H]  /  ALT  492[H]  /  AlkPhos  189[H]  10-29  PT/INR - ( 29 Oct 2024 06:49 )   PT: 120.3 sec;   INR: 10.62 ratio      PTT - ( 29 Oct 2024 06:49 )  PTT:31.9 sec    Urinalysis Basic - ( 29 Oct 2024 06:49 )    Color: x / Appearance: x / SG: x / pH: x  Gluc: 110 mg/dL / Ketone: x  / Bili: x / Urobili: x   Blood: x / Protein: x / Nitrite: x   Leuk Esterase: x / RBC: x / WBC x   Sq Epi: x / Non Sq Epi: x / Bacteria: x    ------------------------------------------------------------------------------------------------------------    CRITICAL CARE:  [ ]Shock Present  [ ]Septic [ ]Cardiogenic [ ]Neurologic [ ]Hypovolemic [ ] Undifferentiated/Mixed   [ ]Vasopressors [ ]Inotropes     [ ]Respiratory failure present: [ ]Acute  [ ]Chronic [ ]Hypoxic  [ ]Hypercarbic   [ ]Mechanical ventilation   [ ]Trach collar   [ ]Non-invasive ventilatory support   [ ]High flow    [ ]Non-rebreather/Venti     [ ]Other organ failure     ------------------------------------------------------------------------------------------------------------    RADIOLOGY & ADDITIONAL STUDIES:    < from: CT Head No Cont (10.29.24 @ 09:51) >  IMPRESSION:  No acute pathology.  < end of copied text >    < from: CT Chest No Cont (10.29.24 @ 09:53) >  IMPRESSION:  *  Bilateral pulmonary groundglass opacity consistent with edema versus   pneumonia.  *  Trace bilateral pleural effusions, ascites, and anasarca.  *  No evidence of acute infectious process in the abdomen and pelvis.  *  Bilateral renal cysts and right renal atrophy. No hydronephrosis.  < end of copied text >    < from: CT Abdomen and Pelvis No Cont (10.29.24 @ 10:01) >  IMPRESSION:  *  Bilateral pulmonary groundglass opacity consistent with edema versus   pneumonia.  *  Trace bilateral pleural effusions, ascites, and anasarca.  *  No evidence of acute infectious process in the abdomen and pelvis.  *  Bilateral renal cysts and right renal atrophy. No hydronephrosis.  < end of copied text >    < from: Xray Chest 1 View- PORTABLE-Urgent (10.28.24 @ 23:37) >  IMPRESSION:  Clear lungs.  < end of copied text >    < from: US Abdomen Complete (10.28.24 @ 21:14) >  IMPRESSION:  1.9 cm gallstone. Mild gallbladder wall thickening measuring up to 4 mm.   No pericholecystic fluid. There is no pericholecystic fluid and there is   no sonographic Joyce sign. Findings are equivocal for cholecystitis.  Atrophic right kidney. Multiple renal cysts. A 1.9 x 1.8 x 2.0 cm mid to   upper pole right renal cyst contains mildly thick septations. Recommend   six-month follow-up.  Small bilateral pleural effusions. Trace perihepatic ascites.  < end of copied text >    < from: US Duplex Venous Lower Ext Ltd, Left (10.28.24 @ 18:09) >  IMPRESSION:  No evidence of left lower extremity deep venous thrombosis.  LEFT calf edema.  < end of copied text >  ------------------------------------------------------------------------------------------------------------    ALLERGIES:  Allergies  No Known Drug Allergies  eggs (Unknown)  Intolerances    MEDICATIONS  (STANDING):  allopurinol 100 milliGRAM(s) Oral daily  furosemide   Injectable 80 milliGRAM(s) IV Push two times a day  metoprolol tartrate Injectable 2.5 milliGRAM(s) IV Push every 6 hours  piperacillin/tazobactam IVPB.. 3.375 Gram(s) IV Intermittent every 12 hours  sodium bicarbonate  Infusion 0.262 mEq/kG/Hr (100 mL/Hr) IV Continuous <Continuous>    MEDICATIONS  (PRN):

## 2024-10-29 NOTE — CONSULT NOTE ADULT - ATTENDING COMMENTS
84 F hx of HF, Afib on Eliquis, JAK2 mutation presenting with SOB, fatigue, and increased leg swelling found to have acute renal failure, transaminitis and leukocytosis.  renal consulted no acute indication for HD  monitor transaminitis  TTE for possible decompensated heart failure and possible congestive hepatology   patient currently hemodynamically stable at this time, reconsult as needed  tele monitor 84 F hx of HF, Afib on Eliquis, JAK2 mutation presenting with SOB, fatigue, and increased leg swelling found to have acute renal failure, transaminitis and leukocytosis.  renal consulted no acute indication for HD, metabolic acidosis with EDA on CKD  monitor transaminitis  infectious work up per primary team   TTE for possible decompensated heart failure and possible congestive hepatology   patient currently hemodynamically stable at this time, reconsult as needed  tele monitor

## 2024-10-29 NOTE — H&P ADULT - PROBLEM SELECTOR PLAN 8
Diet: pending dysphagia screen  DVT ppx; eliquis  Dispo: inpt Diet: pending dysphagia screen  DVT ppx; hold eliquis given elevated INR  Dispo: inpt

## 2024-10-29 NOTE — CONSULT NOTE ADULT - ASSESSMENT
84 F hx of HF, Afib on eliqus, JAK2 mutation presenting with SOB, difficulty walking, fatigue, and increased leg swelling. In the ED, vitals normal. WBC 49, tbili 2.0, alk phos 219, AST 1198, , lactate 4.5. US Abd showing 1.9 cm gallstone, mild wall thickening up to 4 mm, no pericholecystic fluid. Spoke with son and daughter in law, pt has been having no abd symptoms.     PLAN  INCOMPLETE*** 84 F hx of HF, Afib on Eliquis JAK2 mutation presenting with SOB, difficulty walking, fatigue, and increased leg swelling. In the ED, vitals normal. WBC 49, tbili 2.0, alk phos 219, AST 1198, , lactate 4.5. US Abd showing 1.9 cm gallstone, mild wall thickening up to 4 mm, no pericholecystic fluid. Spoke with son and daughter in law, pt has been having no abd symptoms.     PLAN  INCOMPLETE*** 84 F hx of HF, Afib on Eliquis JAK2 mutation presenting with SOB, difficulty walking, fatigue, and increased leg swelling. In the ED, vitals normal. WBC 49, tbili 2.0, alk phos 219, AST 1198, , lactate 4.5. US Abd showing 1.9 cm gallstone, mild wall thickening up to 4 mm, no pericholecystic fluid. Spoke with son and daughter in law, pt has been having no abd symptoms.     PLAN  - At this time, pt does not have signs/symptoms of cholecystitis and would would not be surgical candidate  - Recommend GI consult    B surgery  [id number]

## 2024-10-29 NOTE — CONSULT NOTE ADULT - CONSULT REQUESTED DATE/TIME
29-Oct-2024 09:19
29-Oct-2024 00:12
29-Oct-2024 12:33
29-Oct-2024 12:06
28-Oct-2024 20:55
29-Oct-2024 14:16
29-Oct-2024 20:46
29-Oct-2024 14:41

## 2024-10-29 NOTE — CONSULT NOTE ADULT - SUBJECTIVE AND OBJECTIVE BOX
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    Collateral obtained from son and daughter in law who is her PCP. She has been unwell for the past week, lives above them on the second floor and at baseline is independent and ambulatory, but has been in bed and with poor PO intake over the past week. Per son had significant leg edema preventing her from walking, also w/ poor PO intake. Appeared to have shortness of breath. Symptoms started last Monday.  In the ED was in afib w/ RVR. Labs significant for WBC 49, tbili 2.0, alk phos 219, AST 1198, , Cr. 4.3 (baseline mid 2s), metabolic acidosis with lactate elevation to 5, VBG 7.23/37/15/51, proBNP 35k, Trop 78 --> 69, Ca 8.2. Abdominal US with gallbladder wall thickening.   S/p zosyn, lasix 40 mg IVP x2, metoprolol 25mg, lopressor IV 5mg x3, sodium bicarb 50 IVP, diltiazem 30mg.    (29 Oct 2024 05:17)    Information obtained from chart, pt arousable only to sternal rub with facial grimace s/p ativan this AM for agitation.    PAST MEDICAL & SURGICAL HISTORY:  Thrombocytosis      Hypertension      Hyperlipidemia      Afib      CHF (congestive heart failure)      No significant past surgical history    MEDICATIONS  (STANDING):  allopurinol 100 milliGRAM(s) Oral daily  furosemide   Injectable 80 milliGRAM(s) IV Push two times a day  piperacillin/tazobactam IVPB.. 3.375 Gram(s) IV Intermittent every 12 hours  rasburicase IVPB 6 milliGRAM(s) IV Intermittent once    Allergies  No Known Drug Allergies  eggs (Unknown)    FAMILY HISTORY:  No pertinent family history in first degree relatives  Noncontributory for premature coronary disease or sudden cardiac death    SOCIAL HISTORY:    [x ] Non-smoker  [ ] Smoker  [ ] Alcohol    FLU VACCINE THIS YEAR STARTS IN AUGUST:  [ ] Yes    [ ] No    IF OVER 65 HAVE YOU EVER HAD A PNA VACCINE:  [ ] Yes    [ ] No       [ ] N/A      REVIEW OF SYSTEMS:  [ ]chest pain  [  ]shortness of breath  [  ]palpitations  [  ]syncope  [ ]near syncope [ ]upper extremity weakness   [ ] lower extremity weakness  [  ]diplopia  [  ]altered mental status   [  ]fevers  [ ]chills [ ]nausea  [ ]vomiting  [  ]dysphagia    [ ]abdominal pain  [ ]melena  [ ]BRBPR    [  ]epistaxis  [  ]rash    [ ]lower extremity edema        [ ] All others negative	  [x ] Unable to obtain      LABS:	 	    CARDIAC MARKERS:    Troponin T, High Sensitivity Result: 78, 69                        8.7    46.85 )-----------( 209      ( 29 Oct 2024 10:12 )             28.6     Hb Trend: 8.7<--, 8.7<--, 10.4<--    10-29    137  |  99  |  106[H]  ----------------------------<  110[H]  4.1   |  16[L]  |  4.31[H]    Ca    8.2[L]      29 Oct 2024 06:49  Phos  4.6     10-29  Mg     2.10     10-29    TPro  5.5[L]  /  Alb  3.0[L]  /  TBili  2.0[H]  /  DBili  x   /  AST  615[H]  /  ALT  492[H]  /  AlkPhos  189[H]  10-29    Creatinine Trend: 4.31<--, 4.28<--, 4.33<--    Coags:  PT/INR - ( 29 Oct 2024 06:49 )   PT: 120.3 sec;   INR: 10.62 ratio      PTT - ( 29 Oct 2024 06:49 )  PTT:31.9 sec    TSH: Thyroid Stimulating Hormone, Serum: 3.38 uIU/mL (10-29 @ 06:49)      PHYSICAL EXAM:  T(C): 36.4 (10-29-24 @ 08:50), Max: 37.5 (10-28-24 @ 16:30)  HR: 97 (10-29-24 @ 08:50) (76 - 122)  BP: 116/70 (10-29-24 @ 08:50) (100/58 - 142/74)  RR: 28 (10-29-24 @ 08:50) (17 - 32)  SpO2: 97% (10-29-24 @ 08:50) (96% - 100%)  Wt(kg): --   BMI (kg/m2): 25.5 (10-28-24 @ 14:44)  I&O's Summary      Gen: sleeping on stretcher, arousable to sternal rub  HEENT:  (-)icterus (-)pallor  CV: N S1 S2 1/6 ESEQUIEL (+)2 Pulses B/l  Resp:  Clear to ausculatation B/L, normal effort  GI: (+) BS Soft, NT, ND  Lymph:  Warm LE, 2+ LE Edema, (-)obvious lymphadenopathy  Skin: Warm to touch, Normal turgor  Psych: cannot eval    TELEMETRY: 	AF      ECG:  Afib with RVR	    < from: CT Chest No Cont (10.29.24 @ 09:53) >  IMPRESSION:  *  Bilateral pulmonary ground glass opacity consistent with edema versus   pneumonia.  *  Trace bilateral pleural effusions, ascites, and anasarca.  *  No evidence of acute infectious process in the abdomen and pelvis.  *  Bilateral renal cysts and right renal atrophy. No hydronephrosis.    < end of copied text >    < from: CT Head No Cont (10.29.24 @ 09:51) >  IMPRESSION:    No acute pathology.    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    #Rapid Afib  --in the setting of multiple medical issues (volume overload, elevated WBC from baseline, EDA, transaminitis)  --Toprol on hold, can resume Lopressor 25 bid if BP allows  --Eliquis on hold given supratherapeutic INR, s/p Vit K, Heme following    #Volume Overload  --check TTE, last RICKI 2022 noted, F/U OP records  --pt with known valvular disease  --Recommend Lasix 80 IV BID  --strict I/Os    #EDA  --Renal Following, no plans for HD at this time    # Elevated LFTs  --CT noted, F/U primary team    #AMS/ Encephalopathy  --s/p Ativan, now lethargic but arousable with sternal rub  --currently protecting airway but monitor closely    d/w covering NP Nuria ROBERTS  [phone]            CARDIOLOGY  ***************  date of consult 10/29/24    HISTORY OF PRESENT ILLNESS: HPI:  Pt is a 84-year-old female history of A-fib on Eliquis, CHF on Lasix and Entresto, 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    Collateral obtained from son and daughter in law who is her PCP. She has been unwell for the past week, lives above them on the second floor and at baseline is independent and ambulatory, but has been in bed and with poor PO intake over the past week. Per son had significant leg edema preventing her from walking, also w/ poor PO intake. Appeared to have shortness of breath. Symptoms started last Monday.  In the ED was in afib w/ RVR. Labs significant for WBC 49, tbili 2.0, alk phos 219, AST 1198, , Cr. 4.3 (baseline mid 2s), metabolic acidosis with lactate elevation to 5, VBG 7.23/37/15/51, proBNP 35k, Trop 78 --> 69, Ca 8.2. Abdominal US with gallbladder wall thickening.   S/p zosyn, lasix 40 mg IVP x2, metoprolol 25mg, lopressor IV 5mg x3, sodium bicarb 50 IVP, diltiazem 30mg.    (29 Oct 2024 05:17)    Information obtained from chart, pt arousable only to sternal rub with facial grimace s/p ativan this AM for agitation.    PAST MEDICAL & SURGICAL HISTORY:  Thrombocytosis      Hypertension      Hyperlipidemia      Afib      CHF (congestive heart failure)      No significant past surgical history    MEDICATIONS  (STANDING):  allopurinol 100 milliGRAM(s) Oral daily  furosemide   Injectable 80 milliGRAM(s) IV Push two times a day  piperacillin/tazobactam IVPB.. 3.375 Gram(s) IV Intermittent every 12 hours  rasburicase IVPB 6 milliGRAM(s) IV Intermittent once    Allergies  No Known Drug Allergies  eggs (Unknown)    FAMILY HISTORY:  No pertinent family history in first degree relatives  Noncontributory for premature coronary disease or sudden cardiac death    SOCIAL HISTORY:    [x ] Non-smoker  [ ] Smoker  [ ] Alcohol    FLU VACCINE THIS YEAR STARTS IN AUGUST:  [ ] Yes    [ ] No    IF OVER 65 HAVE YOU EVER HAD A PNA VACCINE:  [ ] Yes    [ ] No       [ ] N/A      REVIEW OF SYSTEMS:  [ ]chest pain  [  ]shortness of breath  [  ]palpitations  [  ]syncope  [ ]near syncope [ ]upper extremity weakness   [ ] lower extremity weakness  [  ]diplopia  [  ]altered mental status   [  ]fevers  [ ]chills [ ]nausea  [ ]vomiting  [  ]dysphagia    [ ]abdominal pain  [ ]melena  [ ]BRBPR    [  ]epistaxis  [  ]rash    [ ]lower extremity edema        [ ] All others negative	  [x ] Unable to obtain      LABS:	 	    CARDIAC MARKERS:    Troponin T, High Sensitivity Result: 78, 69                        8.7    46.85 )-----------( 209      ( 29 Oct 2024 10:12 )             28.6     Hb Trend: 8.7<--, 8.7<--, 10.4<--    10-29    137  |  99  |  106[H]  ----------------------------<  110[H]  4.1   |  16[L]  |  4.31[H]    Ca    8.2[L]      29 Oct 2024 06:49  Phos  4.6     10-29  Mg     2.10     10-29    TPro  5.5[L]  /  Alb  3.0[L]  /  TBili  2.0[H]  /  DBili  x   /  AST  615[H]  /  ALT  492[H]  /  AlkPhos  189[H]  10-29    Creatinine Trend: 4.31<--, 4.28<--, 4.33<--    Coags:  PT/INR - ( 29 Oct 2024 06:49 )   PT: 120.3 sec;   INR: 10.62 ratio      PTT - ( 29 Oct 2024 06:49 )  PTT:31.9 sec    TSH: Thyroid Stimulating Hormone, Serum: 3.38 uIU/mL (10-29 @ 06:49)      PHYSICAL EXAM:  T(C): 36.4 (10-29-24 @ 08:50), Max: 37.5 (10-28-24 @ 16:30)  HR: 97 (10-29-24 @ 08:50) (76 - 122)  BP: 116/70 (10-29-24 @ 08:50) (100/58 - 142/74)  RR: 28 (10-29-24 @ 08:50) (17 - 32)  SpO2: 97% (10-29-24 @ 08:50) (96% - 100%)  Wt(kg): --   BMI (kg/m2): 25.5 (10-28-24 @ 14:44)  I&O's Summary      Gen: sleeping on stretcher, arousable to sternal rub  HEENT:  (-)icterus (-)pallor  CV: N S1 S2 1/6 ESEQUIEL (+)2 Pulses B/l  Resp:  Clear to ausculatation B/L, normal effort  GI: (+) BS Soft, NT, ND  Lymph:  Warm LE, 2+ LE Edema, (-)obvious lymphadenopathy  Skin: Warm to touch, Normal turgor  Psych: cannot eval    TELEMETRY: 	AF      ECG:  Afib with RVR	    < from: CT Chest No Cont (10.29.24 @ 09:53) >  IMPRESSION:  *  Bilateral pulmonary ground glass opacity consistent with edema versus   pneumonia.  *  Trace bilateral pleural effusions, ascites, and anasarca.  *  No evidence of acute infectious process in the abdomen and pelvis.  *  Bilateral renal cysts and right renal atrophy. No hydronephrosis.    < end of copied text >    < from: CT Head No Cont (10.29.24 @ 09:51) >  IMPRESSION:    No acute pathology.    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    #Rapid Afib  --in the setting of multiple medical issues (volume overload, elevated WBC from baseline, EDA, transaminitis)  --Toprol on hold, can resume Lopressor 25 bid if BP allows  --Eliquis on hold given supratherapeutic INR, s/p Vit K, Heme following    #Volume Overload  --check TTE, last RICKI 2022 noted, F/U OP records  --pt with known valvular disease  --Recommend Lasix 80 IV BID  --strict I/Os    #EDA  --Renal Following, no plans for HD at this time    # Elevated LFTs  --CT noted, F/U primary team    #AMS/ Encephalopathy  --s/p Ativan, now lethargic but arousable with sternal rub  --currently protecting airway but monitor closely    d/w covering NP Nuria ROBERTS  [phone]

## 2024-10-29 NOTE — CONSULT NOTE ADULT - NS ATTEND AMEND GEN_ALL_CORE FT
Patient seen and examined. Agree with plan as detailed in PA/NP Note.     Suggest 80 mg IV Lasix  Received ativan protecting airway, hemodynamically stable d/w ACP  Eventually restart BB    Jesenia Hills MD  Pager: [phone]

## 2024-10-29 NOTE — H&P ADULT - ATTENDING COMMENTS
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 Presenting with apparent ams in setting of suspected decompensated RHF possibly complicated by park, and hepatopathy. Superimposed infection vs acute blood based disorder as manifested by wbc near 50k  -cast, strict Is/Os, trial 40 IV lasix bid, assess urine output  response and renal function; cardiology to be called. resume Eliquis or heparin gtt if fails po trial. Tele, TTE  -wbc 50k; Hem aware, perip smear ordered, c/w zosyn for now, follow cultures. 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 Presenting with apparent ams in setting of suspected decompensated RHF possibly complicated by park, and hepatopathy. Superimposed infection vs acute blood based disorder as manifested by wbc near 50k  -cast, strict Is/Os, trial 40 IV lasix bid, assess urine output  response and renal function; cardiology to be called. resume Eliquis or heparin gtt if fails po trial. Tele, TTE  -seen by renal overnight, Bicarb given, most recent blood gas with improving acidosis.   -wbc 50k; Hem aware, perip smear ordered, c/w zosyn for now, follow cultures. CT c/a/p ordered. 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 Presenting with apparent ams in setting of suspected decompensated RHF possibly complicated by park, and hepatopathy. Superimposed infection vs acute blood based disorder as manifested by wbc near 50k  -cast, strict Is/Os, trial 40 IV lasix bid, assess urine output  response and renal function; cardiology to be called. hold  Eliquis given elevated INR; formal Hem input pending. Tele, TTE  -seen by renal overnight, Bicarb given, most recent blood gas with improving acidosis.   -wbc 50k; Hem aware, perip smear ordered, c/w zosyn for now, follow cultures. CT c/a/p ordered. can hold anagrelide for now  -urine/serum tox screen ammonia, CT head, CK ordered   -GOC, palliative eval; full code for now

## 2024-10-29 NOTE — H&P ADULT - HISTORY OF PRESENT ILLNESS
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    Labs significant for WBC 49, tbili 2.0, alk phos 219, AST 1198, , Cr. 4.3 (baseline mid 2s), metabolic acidosis with lactate elevation to 5, VBG 7.23/37/15/51, proBNP 35k, Trop 78 --> 69, Ca 8.2. Abdominal US with gallbladder wall thickening.   Also w/ afib RVR.  S/p zosyn, lasix 40 mg IVP x2, metoprolol 25mg, lopressor IV 5mg x3, sodium bicarb 50 IVP, diltiazem 30mg.    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  Collateral obtained from son and daughter in law who is her PCP. She has been unwell for the past week, lives above them on the second floor and at baseline is independent and ambulatory, but has been in bed and with poor PO intake over the past week. Per son had significant leg edema preventing her from walking, also w/ poor PO intake. Appeared to have shortness of breath. Symptoms started last Monday.  In the ED was in afib w/ RVR. Labs significant for WBC 49, tbili 2.0, alk phos 219, AST 1198, , Cr. 4.3 (baseline mid 2s), metabolic acidosis with lactate elevation to 5, VBG 7.23/37/15/51, proBNP 35k, Trop 78 --> 69, Ca 8.2. Abdominal US with gallbladder wall thickening.   S/p zosyn, lasix 40 mg IVP x2, metoprolol 25mg, lopressor IV 5mg x3, sodium bicarb 50 IVP, diltiazem 30mg.

## 2024-10-29 NOTE — H&P ADULT - ASSESSMENT
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

## 2024-10-29 NOTE — ED ADULT NURSE REASSESSMENT NOTE - NS ED NURSE REASSESS COMMENT FT1
Critical lab value received .3, INR 10.62, Nuria Lewis ACP made aware.
Pt laying in bed, NAD, respirations even and unlabored, afib on the monitor. VS in flow sheet. Pt offers no complaints at this time. Labs drawn per MD orders. 16 Hebrew indwelling catheter placed, sterility maintained, Bonnie reyes at bedside, pt tolerated well. Bed in lowest position, safety maintained.
Pt laying in bed, NAD, respirations even and unlabored, afib on the monitor. VS in flow sheet. Striaght cath per MD orders, ED tech Annie at bedside, sterility maintained. pt tolerated well. Urine output 400 ml clear tiffany urine, collected and sent per MD orders. Pt medicated per MD orders. Pt offers no complaints at this time. Bed in lowest position, safety maintained.
Pt resting, in fetal position, arousal to stimuli, IV removed from Rt forearm. No change to condition.
Break RN: Pt received in stretcher in room 2. Pt resting comfortably and offered no complains at present. respiration even and non-labored. in NAD. +2 edema noted to bilateral lower extremities. VS as noted, lasix  given as per ordered. Remains afib on monitor with HR between 110-130. Pending further order
Pt A&O x1, disoriented, attempting to get out of bed despite being fall precaution. Pt was medicated w. Ativan and is now resting comfortably. Pt does not respond to english commands but only says "yes". Pt has 20G left forearm with medication being administered and another IV in the right forearm, both sites are patent. Urine sample and other labs have been drawn. Bed set to lowest point and both side rails have been placed up.

## 2024-10-29 NOTE — H&P ADULT - PROBLEM SELECTOR PLAN 1
-Cr acutely increased to 4.28, on 10/14 was -Ddx uremia from EDA vs. toxic metabolic encephalopathy from sepsis vs. hepatic encephalopathy   -Per family patient's baseline is independent and she is usually conversational and expresses her needs. Here she is only responding yes/no to some questions and not following commands, per family this is not her baseline   [] f/u CTH   [] f/u ammonia

## 2024-10-29 NOTE — H&P ADULT - PROBLEM SELECTOR PLAN 7
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

## 2024-10-29 NOTE — PATIENT PROFILE ADULT - FALL HARM RISK - HARM RISK INTERVENTIONS
Assistance OOB with selected safe patient handling equipment/Communicate Risk of Fall with Harm to all staff/Reinforce activity limits and safety measures with patient and family/Review medications for side effects contributing to fall risk/Sit up slowly, dangle for a short time, stand at bedside before walking/Tailored Fall Risk Interventions/Toileting schedule using arm’s reach rule for commode and bathroom/Visual Cue: Yellow wristband and red socks/Bed in lowest position, wheels locked, appropriate side rails in place/Call bell, personal items and telephone in reach/Instruct patient to call for assistance before getting out of bed or chair/Non-slip footwear when patient is out of bed/[place] to call system/Physically safe environment - no spills, clutter or unnecessary equipment/Purposeful Proactive Rounding/Room/bathroom lighting operational, light cord in reach

## 2024-10-29 NOTE — CONSULT NOTE ADULT - REASON FOR ADMISSION
shortness of breath and leg edema
shortness of breath and leg edema
Afib, elevated transaminases
shortness of breath and leg edema

## 2024-10-29 NOTE — ED ADULT NURSE REASSESSMENT NOTE - RESPIRATORY RATE (BREATHS/MIN)
Telephone Encounter by Champ Dominguez CMA at 06/25/18 10:44 AM     Author:  Champ Dominguez CMA Service:  (none) Author Type:  Certified Medical Assistant     Filed:  06/25/18 10:52 AM Encounter Date:  6/25/2018 Status:  Signed     :  Champ Dominguez CMA (Certified Medical Assistant)            Called patient's mother and informed her that the website was [email or web address]. Patients mother voiced understanding.[AE1.1M]        Revision History        User Key Date/Time User Provider Type Action    > AE1.1 06/25/18 10:52 AM Chmap Dominguez CMA Certified Medical Assistant Sign    M - Manual            
28
22
27

## 2024-10-29 NOTE — PROGRESS NOTE ADULT - PROBLEM SELECTOR PLAN 9
Diet: failed dysphagia screen, npo for now  DVT ppx; hold eliquis  Dispo: inpt  Pall care consulted, f/u consult recs regarding GOC  Daughter in law and pcp Dr. Apoorva Miles updated and answered all questions on 10/29    Plan discussed with ACP

## 2024-10-30 LAB
ALBUMIN SERPL ELPH-MCNC: 2.8 G/DL — LOW (ref 3.3–5)
ALP SERPL-CCNC: 160 U/L — HIGH (ref 40–120)
ALT FLD-CCNC: 300 U/L — HIGH (ref 4–33)
AMMONIA BLD-MCNC: 47 UMOL/L — SIGNIFICANT CHANGE UP (ref 11–55)
ANION GAP SERPL CALC-SCNC: 16 MMOL/L — HIGH (ref 7–14)
ANION GAP SERPL CALC-SCNC: 19 MMOL/L — HIGH (ref 7–14)
APTT BLD: 31.2 SEC — SIGNIFICANT CHANGE UP (ref 24.5–35.6)
AST SERPL-CCNC: 200 U/L — HIGH (ref 4–32)
BASOPHILS # BLD AUTO: 0.54 K/UL — HIGH (ref 0–0.2)
BASOPHILS NFR BLD AUTO: 1.3 % — SIGNIFICANT CHANGE UP (ref 0–2)
BILIRUB SERPL-MCNC: 2.2 MG/DL — HIGH (ref 0.2–1.2)
BLOOD GAS VENOUS COMPREHENSIVE RESULT: SIGNIFICANT CHANGE UP
BUN SERPL-MCNC: 105 MG/DL — HIGH (ref 7–23)
BUN SERPL-MCNC: 108 MG/DL — HIGH (ref 7–23)
CALCIUM SERPL-MCNC: 8.6 MG/DL — SIGNIFICANT CHANGE UP (ref 8.4–10.5)
CALCIUM SERPL-MCNC: 8.7 MG/DL — SIGNIFICANT CHANGE UP (ref 8.4–10.5)
CHLORIDE SERPL-SCNC: 97 MMOL/L — LOW (ref 98–107)
CHLORIDE SERPL-SCNC: 99 MMOL/L — SIGNIFICANT CHANGE UP (ref 98–107)
CO2 SERPL-SCNC: 24 MMOL/L — SIGNIFICANT CHANGE UP (ref 22–31)
CO2 SERPL-SCNC: 27 MMOL/L — SIGNIFICANT CHANGE UP (ref 22–31)
CREAT SERPL-MCNC: 3.69 MG/DL — HIGH (ref 0.5–1.3)
CREAT SERPL-MCNC: 3.94 MG/DL — HIGH (ref 0.5–1.3)
CULTURE RESULTS: SIGNIFICANT CHANGE UP
EGFR: 11 ML/MIN/1.73M2 — LOW
EGFR: 12 ML/MIN/1.73M2 — LOW
EOSINOPHIL # BLD AUTO: 0.06 K/UL — SIGNIFICANT CHANGE UP (ref 0–0.5)
EOSINOPHIL NFR BLD AUTO: 0.1 % — SIGNIFICANT CHANGE UP (ref 0–6)
GLUCOSE SERPL-MCNC: 121 MG/DL — HIGH (ref 70–99)
GLUCOSE SERPL-MCNC: 155 MG/DL — HIGH (ref 70–99)
HCT VFR BLD CALC: 27.1 % — LOW (ref 34.5–45)
HGB BLD-MCNC: 8.6 G/DL — LOW (ref 11.5–15.5)
IANC: 32.71 K/UL — HIGH (ref 1.8–7.4)
IMM GRANULOCYTES NFR BLD AUTO: 9.9 % — HIGH (ref 0–0.9)
INR BLD: 3.04 RATIO — HIGH (ref 0.85–1.16)
LACTATE SERPL-SCNC: 1.9 MMOL/L — SIGNIFICANT CHANGE UP (ref 0.5–2)
LDH SERPL L TO P-CCNC: 628 U/L — HIGH (ref 135–225)
LYMPHOCYTES # BLD AUTO: 2.93 K/UL — SIGNIFICANT CHANGE UP (ref 1–3.3)
LYMPHOCYTES # BLD AUTO: 7.1 % — LOW (ref 13–44)
MAGNESIUM SERPL-MCNC: 2 MG/DL — SIGNIFICANT CHANGE UP (ref 1.6–2.6)
MAGNESIUM SERPL-MCNC: 2 MG/DL — SIGNIFICANT CHANGE UP (ref 1.6–2.6)
MCHC RBC-ENTMCNC: 23.8 PG — LOW (ref 27–34)
MCHC RBC-ENTMCNC: 31.7 G/DL — LOW (ref 32–36)
MCV RBC AUTO: 75.1 FL — LOW (ref 80–100)
MONOCYTES # BLD AUTO: 1.03 K/UL — HIGH (ref 0–0.9)
MONOCYTES NFR BLD AUTO: 2.5 % — SIGNIFICANT CHANGE UP (ref 2–14)
NEUTROPHILS # BLD AUTO: 32.71 K/UL — HIGH (ref 1.8–7.4)
NEUTROPHILS NFR BLD AUTO: 79.1 % — HIGH (ref 43–77)
NRBC # BLD: 1 /100 WBCS — HIGH (ref 0–0)
NRBC # FLD: 0.47 K/UL — HIGH (ref 0–0)
PHOSPHATE SERPL-MCNC: 2.7 MG/DL — SIGNIFICANT CHANGE UP (ref 2.5–4.5)
PHOSPHATE SERPL-MCNC: 3.3 MG/DL — SIGNIFICANT CHANGE UP (ref 2.5–4.5)
PLATELET # BLD AUTO: 308 K/UL — SIGNIFICANT CHANGE UP (ref 150–400)
POTASSIUM SERPL-MCNC: 2.8 MMOL/L — CRITICAL LOW (ref 3.5–5.3)
POTASSIUM SERPL-MCNC: 3.1 MMOL/L — LOW (ref 3.5–5.3)
POTASSIUM SERPL-SCNC: 2.8 MMOL/L — CRITICAL LOW (ref 3.5–5.3)
POTASSIUM SERPL-SCNC: 3.1 MMOL/L — LOW (ref 3.5–5.3)
PROT SERPL-MCNC: 5.1 G/DL — LOW (ref 6–8.3)
PROTHROM AB SERPL-ACNC: 35.8 SEC — HIGH (ref 9.9–13.4)
RBC # BLD: 3.61 M/UL — LOW (ref 3.8–5.2)
RBC # FLD: 24 % — HIGH (ref 10.3–14.5)
SODIUM SERPL-SCNC: 140 MMOL/L — SIGNIFICANT CHANGE UP (ref 135–145)
SODIUM SERPL-SCNC: 142 MMOL/L — SIGNIFICANT CHANGE UP (ref 135–145)
SPECIMEN SOURCE: SIGNIFICANT CHANGE UP
URATE SERPL-MCNC: 7.5 MG/DL — HIGH (ref 2.5–7)
WBC # BLD: 41.38 K/UL — CRITICAL HIGH (ref 3.8–10.5)
WBC # FLD AUTO: 41.38 K/UL — CRITICAL HIGH (ref 3.8–10.5)

## 2024-10-30 PROCEDURE — 99232 SBSQ HOSP IP/OBS MODERATE 35: CPT

## 2024-10-30 PROCEDURE — 99233 SBSQ HOSP IP/OBS HIGH 50: CPT

## 2024-10-30 RX ORDER — POTASSIUM CHLORIDE 10 MEQ
40 TABLET, EXTENDED RELEASE ORAL EVERY 4 HOURS
Refills: 0 | Status: COMPLETED | OUTPATIENT
Start: 2024-10-30 | End: 2024-10-30

## 2024-10-30 RX ORDER — CHLORHEXIDINE GLUCONATE 40 MG/ML
1 SOLUTION TOPICAL DAILY
Refills: 0 | Status: DISCONTINUED | OUTPATIENT
Start: 2024-10-30 | End: 2024-11-07

## 2024-10-30 RX ORDER — POTASSIUM CHLORIDE 10 MEQ
10 TABLET, EXTENDED RELEASE ORAL
Refills: 0 | Status: COMPLETED | OUTPATIENT
Start: 2024-10-30 | End: 2024-10-30

## 2024-10-30 RX ORDER — POTASSIUM CHLORIDE 10 MEQ
40 TABLET, EXTENDED RELEASE ORAL EVERY 4 HOURS
Refills: 0 | Status: DISCONTINUED | OUTPATIENT
Start: 2024-10-30 | End: 2024-10-30

## 2024-10-30 RX ADMIN — Medication 100 MILLIEQUIVALENT(S): at 11:04

## 2024-10-30 RX ADMIN — Medication 100 MILLIEQUIVALENT(S): at 19:42

## 2024-10-30 RX ADMIN — CHLORHEXIDINE GLUCONATE 1 APPLICATION(S): 40 SOLUTION TOPICAL at 12:15

## 2024-10-30 RX ADMIN — Medication 80 MILLIGRAM(S): at 18:20

## 2024-10-30 RX ADMIN — Medication 2.5 MILLIGRAM(S): at 12:05

## 2024-10-30 RX ADMIN — PIPERACILLIN AND TAZOBACTAM 25 GRAM(S): .5; 4 INJECTION, POWDER, LYOPHILIZED, FOR SOLUTION INTRAVENOUS at 18:19

## 2024-10-30 RX ADMIN — Medication 100 MILLIGRAM(S): at 11:26

## 2024-10-30 RX ADMIN — Medication 100 MEQ/KG/HR: at 02:17

## 2024-10-30 RX ADMIN — Medication 100 MILLIEQUIVALENT(S): at 10:26

## 2024-10-30 RX ADMIN — Medication 2.5 MILLIGRAM(S): at 06:10

## 2024-10-30 RX ADMIN — Medication 40 MILLIEQUIVALENT(S): at 18:19

## 2024-10-30 RX ADMIN — Medication 40 MILLIEQUIVALENT(S): at 21:30

## 2024-10-30 RX ADMIN — PHYTONADIONE 102 MILLIGRAM(S): 5 TABLET ORAL at 12:04

## 2024-10-30 RX ADMIN — Medication 100 MILLIEQUIVALENT(S): at 17:16

## 2024-10-30 RX ADMIN — Medication 80 MILLIGRAM(S): at 06:10

## 2024-10-30 RX ADMIN — PIPERACILLIN AND TAZOBACTAM 25 GRAM(S): .5; 4 INJECTION, POWDER, LYOPHILIZED, FOR SOLUTION INTRAVENOUS at 06:30

## 2024-10-30 RX ADMIN — Medication 2.5 MILLIGRAM(S): at 18:23

## 2024-10-30 RX ADMIN — Medication 100 MILLIEQUIVALENT(S): at 11:27

## 2024-10-30 NOTE — PROGRESS NOTE ADULT - PROBLEM SELECTOR PLAN 9
Diet: failed dysphagia screen, npo for now  DVT ppx; hold eliquis  Dispo: inpt  Pall care consult appreciated, pt is full code   Marshall Burr at bedside updated and answered all questions on 10/30  f/u swallow eval      Plan discussed with ACP Diet: failed dysphagia screen, npo for now  DVT ppx; hold eliquis  Dispo: inpt  Pall care consult appreciated, pt is full code   Marshall Burr at bedside updated and answered all questions on 10/30  f/u swallow eval    Hypokalemia- replete k    Plan discussed with ACP

## 2024-10-30 NOTE — PROGRESS NOTE ADULT - SUBJECTIVE AND OBJECTIVE BOX
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  Collateral obtained from son and daughter in law who is her PCP. She has been unwell for the past week, lives above them on the second floor and at baseline is independent and ambulatory, but has been in bed and with poor PO intake over the past week. Per son had significant leg edema preventing her from walking, also w/ poor PO intake. Appeared to have shortness of breath. Symptoms started last Monday.  In the ED was in afib w/ RVR. Labs significant for WBC 49, tbili 2.0, alk phos 219, AST 1198, , Cr. 4.3 (baseline mid 2s), metabolic acidosis with lactate elevation to 5, VBG 7.23/37/15/51, proBNP 35k, Trop 78 --> 69, Ca 8.2. Abdominal US with gallbladder wall thickening.   S/p zosyn, lasix 40 mg IVP x2, metoprolol 25mg, lopressor IV 5mg x3, sodium bicarb 50 IVP, diltiazem 30mg.    (29 Oct 2024 05:17)    Somnolent after given ativan.  Able to lie flat.  AFib was rapid on arrival, now rate-controlled after IV AVN blockers.  Not able to participate in HPI/ROS due to somnolence at this time.  Date of service 10/30- sleeping in bed, no overnight issues.    PAST MEDICAL & SURGICAL HISTORY:  Thrombocytosis  Hypertension  Hyperlipidemia  Afib  CHF (congestive heart failure)  No significant past surgical history    allopurinol 100 milliGRAM(s) Oral daily  chlorhexidine 2% Cloths 1 Application(s) Topical daily  furosemide   Injectable 80 milliGRAM(s) IV Push two times a day  influenza  Vaccine (HIGH DOSE) 0.5 milliLiter(s) IntraMuscular once  metoprolol tartrate Injectable 2.5 milliGRAM(s) IV Push every 6 hours  phytonadione  IVPB 10 milliGRAM(s) IV Intermittent daily  piperacillin/tazobactam IVPB.. 3.375 Gram(s) IV Intermittent every 12 hours                            8.6    41.38 )-----------( 308      ( 30 Oct 2024 05:43 )             27.1       10-30    142  |  99  |  108[H]  ----------------------------<  121[H]  2.8[LL]   |  24  |  3.94[H]    Ca    8.6      30 Oct 2024 05:43  Phos  3.3     10-30  Mg     2.00     10-30    TPro  5.1[L]  /  Alb  2.8[L]  /  TBili  2.2[H]  /  DBili  x   /  AST  200[H]  /  ALT  300[H]  /  AlkPhos  160[H]  10-30    T(C): 36.3 (10-30-24 @ 11:11), Max: 36.6 (10-29-24 @ 21:50)  HR: 98 (10-30-24 @ 11:11) (74 - 98)  BP: 117/57 (10-30-24 @ 11:11) (95/63 - 117/57)  RR: 18 (10-30-24 @ 11:11) (17 - 18)  SpO2: 98% (10-30-24 @ 11:11) (97% - 100%)  Wt(kg): --    I&O's Summary    29 Oct 2024 07:01  -  30 Oct 2024 07:00  --------------------------------------------------------  IN: 0 mL / OUT: 1500 mL / NET: -1500 mL        Appearance: Normal size elderly woman, somnolent	  HEENT:   Normal oral mucosa, PERRL, EOMI	  Lymphatic: No lymphadenopathy , + edema in BL feet  Cardiovascular: irregular S1 S2, No JVD, No murmurs , Peripheral pulses palpable 2+ bilaterally  Respiratory: Lungs clear to auscultation, normal effort 	  Gastrointestinal:  Soft, Non-tender, + BS	  Skin: No rashes, No ecchymoses, No cyanosis, warm to touch  Musculoskeletal: No spontaneous movement due to sedation.  Psychiatry:  Mood & affect unable to determine due to sedation.    TELEMETRY: AF RVR --> rate controlled	    ECG: AF / RVR      ASSESSMENT/PLAN: Ms Guerin is an 84y Female brought in from home with altered mental status.  Hyperuricemia, uremia / acute kidney injury, ? of tumor lysis syndrome. WBC# nearly 50k.  AFib is under better rate control after IV diltiazem and metoprolol.  When awake, can resume oral metoprolol 50mg PO Q6hrs, with holding parameters for HR<50 at rest.  Consider anticoagulation with HEPARIN pending resolution of EDA, and potential for invasive procedures while hospitalized.  Dose of apixaban on discharge would be 2.5mg BID, as she is >79yo and weight is <60kg.  No invasive EP procedures planned.    Jero Waite M.D.  Cardiac Electrophysiology    office [phone]  pager [phone]

## 2024-10-30 NOTE — PROGRESS NOTE ADULT - SUBJECTIVE AND OBJECTIVE BOX
[facility], Division of Infectious Diseases  JOSE Carter Y. Patel, S. Shah, G. Reagan  [phone]  ([phone] - weekdays after 5pm and weekends)    Name: TRIP MAGALLANES  Age/Gender: 84y Female  MRN: [id number]    Interval History:  Notes reviewed.   No concerning overnight events.  Afebrile.     Allergies: No Known Drug Allergies  eggs (Unknown)      Objective:  Vitals:   T(F): 97.4 (10-30-24 @ 11:11), Max: 97.9 (10-29-24 @ 21:50)  HR: 98 (10-30-24 @ 11:11) (74 - 98)  BP: 117/57 (10-30-24 @ 11:11) (95/63 - 117/57)  RR: 18 (10-30-24 @ 11:11) (17 - 18)  SpO2: 98% (10-30-24 @ 11:11) (97% - 100%)  Physical Examination:  General: no acute distress, more awake and alert  HEENT: anicteric  Cardio: S1, S2, normal rate  Resp: clear to auscultation anteriorly  Abd: soft, NT, ND  Ext: b/l LE edema  Skin: warm, dry    Laboratory Studies:  CBC:                       8.6    41.38 )-----------( 308      ( 30 Oct 2024 05:43 )             27.1     WBC Trend:  41.38 10-30-24 @ 05:43  43.83 10-29-24 @ 17:30  46.85 10-29-24 @ 10:12  47.70 10-29-24 @ 06:49  49.74 10-28-24 @ 17:24    CMP: 10-30    142  |  99  |  108[H]  ----------------------------<  121[H]  2.8[LL]   |  24  |  3.94[H]    Ca    8.6      30 Oct 2024 05:43  Phos  3.3     10-30  Mg     2.00     10-30    TPro  5.1[L]  /  Alb  2.8[L]  /  TBili  2.2[H]  /  DBili  x   /  AST  200[H]  /  ALT  300[H]  /  AlkPhos  160[H]  10-30      LIVER FUNCTIONS - ( 30 Oct 2024 05:43 )  Alb: 2.8 g/dL / Pro: 5.1 g/dL / ALK PHOS: 160 U/L / ALT: 300 U/L / AST: 200 U/L / GGT: x             Urinalysis Basic - ( 30 Oct 2024 05:43 )    Color: x / Appearance: x / SG: x / pH: x  Gluc: 121 mg/dL / Ketone: x  / Bili: x / Urobili: x   Blood: x / Protein: x / Nitrite: x   Leuk Esterase: x / RBC: x / WBC x   Sq Epi: x / Non Sq Epi: x / Bacteria: x      Microbiology: reviewed     Culture - Urine (collected 10-29-24 @ 09:31)  Source: Clean Catch Clean Catch (Midstream)  Final Report (10-30-24 @ 06:36):    <10,000 CFU/mL Normal Urogenital Kimberly    Culture - Blood (collected 10-28-24 @ 19:10)  Source: .Blood BLOOD  Preliminary Report (10-30-24 @ 01:01):    No growth at 24 hours    Culture - Blood (collected 10-28-24 @ 19:05)  Source: .Blood BLOOD  Preliminary Report (10-30-24 @ 01:01):    No growth at 24 hours        Radiology: reviewed     Medications:  allopurinol 100 milliGRAM(s) Oral daily  chlorhexidine 2% Cloths 1 Application(s) Topical daily  furosemide   Injectable 80 milliGRAM(s) IV Push two times a day  influenza  Vaccine (HIGH DOSE) 0.5 milliLiter(s) IntraMuscular once  metoprolol tartrate Injectable 2.5 milliGRAM(s) IV Push every 6 hours  phytonadione  IVPB 10 milliGRAM(s) IV Intermittent daily  piperacillin/tazobactam IVPB.. 3.375 Gram(s) IV Intermittent every 12 hours    Antimicrobials:  piperacillin/tazobactam IVPB.. 3.375 Gram(s) IV Intermittent every 12 hours

## 2024-10-30 NOTE — PROGRESS NOTE ADULT - ASSESSMENT
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  CT abd/pelvis- no evidence of acute infectious process. right renal cyst contains mildly thick septations  abd US- 1.9 cm gallstone. Mild gallbladder wall thickening. No pericholecystic fluid. equivocal for cholecystitis.  s/p surgery eval- low suspicion for cholecystitis, not a surgical candidate    CHF, elevated liver enzymes  c/f congestive hepatopathy  b/l LE edema  pleural effusions, ascites, anasarca on CT  TTE- severe aortic stenosis, moderate to severe MR & TR    ET/MPN  leukocytosis likely significantly in part 2/2 ET/MPN but infection needs to be ruled out  LDH, uric acid elevated  - rasburicase given, on allopurinol    Recommendations  c/w zosyn for now  f/u blood cultures- NGTD  diuresis per cardiology  trend temps, wbc, liver enzymes, creatinine    Tom Kirk M.D.  [facility], Division of Infectious Diseases  [phone]  After 5pm on weekdays and all day on weekends - please call [phone]

## 2024-10-30 NOTE — PROGRESS NOTE ADULT - SUBJECTIVE AND OBJECTIVE BOX
[facility] Division of Kidney Diseases & Hypertension  FOLLOW UP NOTE  [phone]--------------------------------------------------------------------------------    Chief Complaint: EDA on CKD, metabolic acidosis    24 hour events/subjective: Pt. was seen and evaluated at bedside earlier today. Pt. more awake, reports improvement in dyspnea, and BL LE edema. Offers no new complaints. Denies any headaches, fevers/chills, chest pain, and abdominal pain.    PAST HISTORY  --------------------------------------------------------------------------------  No significant changes to PMH, PSH, FHx, SHx, unless otherwise noted    ALLERGIES & MEDICATIONS  --------------------------------------------------------------------------------  Allergies    No Known Drug Allergies  eggs (Unknown)    Intolerances      Standing Inpatient Medications  allopurinol 100 milliGRAM(s) Oral daily  chlorhexidine 2% Cloths 1 Application(s) Topical daily  furosemide   Injectable 80 milliGRAM(s) IV Push two times a day  influenza  Vaccine (HIGH DOSE) 0.5 milliLiter(s) IntraMuscular once  metoprolol tartrate Injectable 2.5 milliGRAM(s) IV Push every 6 hours  phytonadione  IVPB 10 milliGRAM(s) IV Intermittent daily  piperacillin/tazobactam IVPB.. 3.375 Gram(s) IV Intermittent every 12 hours    PRN Inpatient Medications      REVIEW OF SYSTEMS  --------------------------------------------------------------------------------  Gen: No fevers/chills  Skin: No rashes  Head/Eyes/Ears/Mouth: No headache  Respiratory: +Dyspnea (improved)  CV: No chest pain  GI: No abdominal pain  MSK: +BL LE edema (improving)  Neuro: No dizziness/lightheadedness    All other systems were reviewed and are negative, except as noted.     VITALS/PHYSICAL EXAM  --------------------------------------------------------------------------------  T(C): 36.3 (10-30-24 @ 11:11), Max: 36.6 (10-29-24 @ 21:50)  HR: 98 (10-30-24 @ 11:11) (74 - 98)  BP: 117/57 (10-30-24 @ 11:11) (95/63 - 117/57)  RR: 18 (10-30-24 @ 11:11) (17 - 18)  SpO2: 98% (10-30-24 @ 11:11) (97% - 100%)  Wt(kg): --  Height (cm): 149.9 (10-28-24 @ 14:44)  Weight (kg): 57.2 (10-28-24 @ 14:44)  BMI (kg/m2): 25.5 (10-28-24 @ 14:44)  BSA (m2): 1.52 (10-28-24 @ 14:44)    10-29-24 @ 07:01  -  10-30-24 @ 07:00  --------------------------------------------------------  IN: 0 mL / OUT: 1500 mL / NET: -1500 mL    Physical Exam:  Gen: Chronically ill appearing but in NAD  HEENT: Anicteric  Pulm: Diminished B/L  CV: S1S2+  Abd: Soft, +BS    Ext: +Mild LE edema B/L (L>R)  Neuro: Awake  Skin: Warm and dry    LABS/STUDIES  --------------------------------------------------------------------------------              8.6    41.38 >-----------<  308      [10-30-24 @ 05:43]              27.1     142  |  99  |  108  ----------------------------<  121      [10-30-24 @ 05:43]  2.8   |  24  |  3.94        Ca     8.6     [10-30-24 @ 05:43]      Mg     2.00     [10-30-24 @ 05:43]      Phos  3.3     [10-30-24 @ 05:43]    TPro  5.1  /  Alb  2.8  /  TBili  2.2  /  DBili  x   /  AST  200  /  ALT  300  /  AlkPhos  160  [10-30-24 @ 05:43]    PT/INR: PT 35.8 , INR 3.04       [10-30-24 @ 05:43]  PTT: 31.2       [10-30-24 @ 05:43]    Uric acid 7.5      [10-30-24 @ 05:43]        [10-30-24 @ 05:43]    Creatinine Trend:  SCr 3.94 [10-30 @ 05:43]  SCr 4.39 [10-29 @ 17:18]  SCr 4.31 [10-29 @ 06:49]  SCr 4.28 [10-28 @ 20:19]  SCr 4.33 [10-28 @ 17:24]    Urine Creatinine 49      [10-29-24 @ 08:28]  Urine Protein 13      [10-29-24 @ 08:28]  Urine Sodium 68      [10-29-24 @ 08:28]  Urine Urea Nitrogen 387.0      [10-29-24 @ 08:28]  Urine Potassium 16.0      [10-29-24 @ 08:28]  Urine Osmolality 347      [10-29-24 @ 08:28]    TSH 3.38      [10-29-24 @ 06:49]    HCV 0.29, Nonreact      [10-29-24 @ 06:49]

## 2024-10-30 NOTE — PROGRESS NOTE ADULT - SUBJECTIVE AND OBJECTIVE BOX
Patient is a 84y old  Female who presents with a chief complaint of shortness of breath and leg edema (30 Oct 2024 14:09)      SUBJECTIVE / OVERNIGHT EVENTS: Pt seen and examined at 12:20pm, overnight events noted, pt's mental status has much improved, is alert, awake, answers questions, denies any complaints, No reports of any bleeding. Son Leno at bedside.    MEDICATIONS  (STANDING):  allopurinol 100 milliGRAM(s) Oral daily  chlorhexidine 2% Cloths 1 Application(s) Topical daily  furosemide   Injectable 80 milliGRAM(s) IV Push two times a day  influenza  Vaccine (HIGH DOSE) 0.5 milliLiter(s) IntraMuscular once  metoprolol tartrate Injectable 2.5 milliGRAM(s) IV Push every 6 hours  phytonadione  IVPB 10 milliGRAM(s) IV Intermittent daily  piperacillin/tazobactam IVPB.. 3.375 Gram(s) IV Intermittent every 12 hours    MEDICATIONS  (PRN):      Vital Signs Last 24 Hrs  T(C): 36.3 (30 Oct 2024 11:11), Max: 36.6 (29 Oct 2024 21:50)  T(F): 97.4 (30 Oct 2024 11:11), Max: 97.9 (29 Oct 2024 21:50)  HR: 98 (30 Oct 2024 11:11) (74 - 98)  BP: 117/57 (30 Oct 2024 11:11) (95/63 - 117/57)  BP(mean): --  RR: 18 (30 Oct 2024 11:11) (17 - 18)  SpO2: 98% (30 Oct 2024 11:11) (97% - 100%)    Parameters below as of 30 Oct 2024 11:11  Patient On (Oxygen Delivery Method): room air      CAPILLARY BLOOD GLUCOSE        I&O's Summary    29 Oct 2024 07:01  -  30 Oct 2024 07:00  --------------------------------------------------------  IN: 0 mL / OUT: 1500 mL / NET: -1500 mL        PHYSICAL EXAM:  GENERAL: NAD  CHEST/LUNG: ctab  HEART: Irregularly irregular  ABDOMEN: Soft, Nontender, Nondistended  EXTREMITIES:  b/l LE edema+ L>R improving  PSYCH: calm now  NEUROLOGY: AA oriented to self, place, yr 2024, not able to say the month      LABS:                        8.6    41.38 )-----------( 308      ( 30 Oct 2024 05:43 )             27.1     10-30    142  |  99  |  108[H]  ----------------------------<  121[H]  2.8[LL]   |  24  |  3.94[H]    Ca    8.6      30 Oct 2024 05:43  Phos  3.3     10-30  Mg     2.00     10-30    TPro  5.1[L]  /  Alb  2.8[L]  /  TBili  2.2[H]  /  DBili  x   /  AST  200[H]  /  ALT  300[H]  /  AlkPhos  160[H]  10-30    PT/INR - ( 30 Oct 2024 05:43 )   PT: 35.8 sec;   INR: 3.04 ratio         PTT - ( 30 Oct 2024 05:43 )  PTT:31.2 sec      Urinalysis Basic - ( 30 Oct 2024 05:43 )    Color: x / Appearance: x / SG: x / pH: x  Gluc: 121 mg/dL / Ketone: x  / Bili: x / Urobili: x   Blood: x / Protein: x / Nitrite: x   Leuk Esterase: x / RBC: x / WBC x   Sq Epi: x / Non Sq Epi: x / Bacteria: x        RADIOLOGY & ADDITIONAL TESTS:  < from: TTE W or WO Ultrasound Enhancing Agent (10.29.24 @ 13:17) >  TRANSTHORACIC ECHOCARDIOGRAM REPORT    < end of copied text >  < from: TTE W or WO Ultrasound Enhancing Agent (10.29.24 @ 13:17) >   1. Left ventricular endocardium is not well visualized; however, the left ventricular systolic function appears grossly normal.   2. Enlarged right ventricular cavity size and reduced right ventricular systolic function.   3. There is calcification of the aortic valve leaflets. There is severe aortic stenosis. The peak transaortic velocity is 2.33 m/s, peak transaortic gradient is 21.8 mmHg and mean transaortic gradient is 13.3 mmHg with an LVOT/aortic valve VTI ratio of 0.39. The aortic valve area is estimated at 1.00 cm² by the continuity equation.   4. Structurally normal mitral valve with normal leaflet excursion. There is calcification of the mitral valve annulus. There is moderate to severe mitral regurgitation.   5. Structurally normal tricuspid valve with normal leaflet excursion. There is moderate to severe tricuspid regurgitation.   6. No pericardial effusion seen.   7. The inferior vena cava is dilated (dilated >2.1cm) with abnormal inspiratory collapse (abnormal <50%) consistent with elevated right atrial pressure (~15, range 10-20mmHg).   8. Technically difficult image quality.      < end of copied text >    Imaging Personally Reviewed:    Consultant(s) Notes Reviewed:      Care Discussed with Consultants/Other Providers:

## 2024-10-30 NOTE — PROGRESS NOTE ADULT - SUBJECTIVE AND OBJECTIVE BOX
Date of service  10/30/24    no chest pain, SOB improved, overall more awake and alert, no abdominal pain.  ROS otherwise negative    MEDS  allopurinol 100 milliGRAM(s) Oral daily  chlorhexidine 2% Cloths 1 Application(s) Topical daily  furosemide   Injectable 80 milliGRAM(s) IV Push two times a day  influenza  Vaccine (HIGH DOSE) 0.5 milliLiter(s) IntraMuscular once  metoprolol tartrate Injectable 2.5 milliGRAM(s) IV Push every 6 hours  phytonadione  IVPB 10 milliGRAM(s) IV Intermittent daily  piperacillin/tazobactam IVPB.. 3.375 Gram(s) IV Intermittent every 12 hours                            8.6    41.38 )-----------( 308      ( 30 Oct 2024 05:43 )             27.1       10-30    142  |  99  |  108[H]  ----------------------------<  121[H]  2.8[LL]   |  24  |  3.94[H]    Ca    8.6      30 Oct 2024 05:43  Phos  3.3     10-30  Mg     2.00     10-30    TPro  5.1[L]  /  Alb  2.8[L]  /  TBili  2.2[H]  /  DBili  x   /  AST  200[H]  /  ALT  300[H]  /  AlkPhos  160[H]  10-30        T(C): 36.3 (10-30-24 @ 11:11), Max: 36.6 (10-29-24 @ 21:50)  HR: 98 (10-30-24 @ 11:11) (74 - 98)  BP: 117/57 (10-30-24 @ 11:11) (95/63 - 117/57)  RR: 18 (10-30-24 @ 11:11) (17 - 18)  SpO2: 98% (10-30-24 @ 11:11) (97% - 100%)  Wt(kg): --    I&O's Summary    29 Oct 2024 07:01  -  30 Oct 2024 07:00  --------------------------------------------------------  IN: 0 mL / OUT: 1500 mL / NET: -1500 mL        General: Well nourished in no acute distress. Alert and Oriented * 3.   Head: Normocephalic and atraumatic.   Neck: + JVD. No bruits. Supple. Does not appear to be enlarged.   Cardiovascular: + S1,S2 ; RRR Soft systolic murmur at the left lower sternal border. No rubs noted.    Lungs: CTA b/l. No rhonchi, rales or wheezes.   Abdomen: + BS, soft. Non tender. Non distended. No rebound. No guarding.   Extremities: No clubbing/cyanosis/1+ LE edema   Neurologic: Moves all four extremities. Full range of motion.   Skin: Warm and moist. The patient's skin has normal elasticity and good skin turgor.   Psychiatric: Appropriate mood and affect.  Musculoskeletal: Normal range of motion, normal strength        DATA    TELEMETRY: 	AF      ECG:  Afib with RVR	    < from: CT Chest No Cont (10.29.24 @ 09:53) >  IMPRESSION:  *  Bilateral pulmonary ground glass opacity consistent with edema versus   pneumonia.  *  Trace bilateral pleural effusions, ascites, and anasarca.  *  No evidence of acute infectious process in the abdomen and pelvis.  *  Bilateral renal cysts and right renal atrophy. No hydronephrosis.    < end of copied text >    < from: CT Head No Cont (10.29.24 @ 09:51) >  IMPRESSION:    No acute pathology.    < end of copied text >      < from: TTE W or WO Ultrasound Enhancing Agent (10.29.24 @ 13:17) >  CONCLUSIONS:      1. Left ventricular endocardium is not well visualized; however, the left ventricular systolic function appears grossly normal.   2. Enlarged right ventricular cavity size and reduced right ventricular systolic function.   3. There is calcification of the aortic valve leaflets. There is severe aortic stenosis. The peak transaortic velocity is 2.33 m/s, peak transaortic gradient is 21.8 mmHg and mean transaortic gradient is 13.3 mmHg with an LVOT/aortic valve VTI ratio of 0.39. The aortic valve area is estimated at 1.00 cm² by the continuity equation.   4. Structurally normal mitral valve with normal leaflet excursion. There is calcification of the mitral valve annulus. There is moderate to severe mitral regurgitation.   5. Structurally normal tricuspid valve with normal leaflet excursion. There is moderate to severe tricuspid regurgitation.   6. No pericardial effusion seen.   7. The inferior vena cava is dilated (dilated >2.1cm) with abnormal inspiratory collapse (abnormal <50%) consistent with elevated right atrial pressure (~15, range 10-20mmHg).   8. Technically difficult image quality.    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    #Rapid Afib  --in the setting of multiple medical issues (volume overload, elevated WBC from baseline, EDA, transaminitis)  --Toprol on hold, can resume Lopressor 25 bid if BP allows  --Eliquis on hold given supratherapeutic INR, s/p Vit K, Heme following    #Volume Overload  --pt with known valvular disease  --TTE with severe AS, mod-sev MR, mod-sev TR  --Recommend continue Lasix 80 IV BID  --strict I/Os  --clarify overall GOC and prognosis from heme perspective  --given severe valvular disease, can consult interventional cardiology if within GOC    #EDA  --Renal Following, no plans for HD at this time    # Elevated LFTs  --CT noted, F/U primary team    #AMS/ Encephalopathy  --resolving        Zara ROBERTS  [phone]                  Date of service  10/30/24    no chest pain, SOB improved, overall more awake and alert, no abdominal pain.  ROS otherwise negative    MEDS  allopurinol 100 milliGRAM(s) Oral daily  chlorhexidine 2% Cloths 1 Application(s) Topical daily  furosemide   Injectable 80 milliGRAM(s) IV Push two times a day  influenza  Vaccine (HIGH DOSE) 0.5 milliLiter(s) IntraMuscular once  metoprolol tartrate Injectable 2.5 milliGRAM(s) IV Push every 6 hours  phytonadione  IVPB 10 milliGRAM(s) IV Intermittent daily  piperacillin/tazobactam IVPB.. 3.375 Gram(s) IV Intermittent every 12 hours                            8.6    41.38 )-----------( 308      ( 30 Oct 2024 05:43 )             27.1       10-30    142  |  99  |  108[H]  ----------------------------<  121[H]  2.8[LL]   |  24  |  3.94[H]    Ca    8.6      30 Oct 2024 05:43  Phos  3.3     10-30  Mg     2.00     10-30    TPro  5.1[L]  /  Alb  2.8[L]  /  TBili  2.2[H]  /  DBili  x   /  AST  200[H]  /  ALT  300[H]  /  AlkPhos  160[H]  10-30        T(C): 36.3 (10-30-24 @ 11:11), Max: 36.6 (10-29-24 @ 21:50)  HR: 98 (10-30-24 @ 11:11) (74 - 98)  BP: 117/57 (10-30-24 @ 11:11) (95/63 - 117/57)  RR: 18 (10-30-24 @ 11:11) (17 - 18)  SpO2: 98% (10-30-24 @ 11:11) (97% - 100%)  Wt(kg): --    I&O's Summary    29 Oct 2024 07:01  -  30 Oct 2024 07:00  --------------------------------------------------------  IN: 0 mL / OUT: 1500 mL / NET: -1500 mL        General: Well nourished in no acute distress. Alert and Oriented * 3.   Head: Normocephalic and atraumatic.   Neck: + JVD. No bruits. Supple. Does not appear to be enlarged.   Cardiovascular: + S1,S2 ; RRR Soft systolic murmur at the left lower sternal border. No rubs noted.    Lungs: CTA b/l. No rhonchi, rales or wheezes.   Abdomen: + BS, soft. Non tender. Non distended. No rebound. No guarding.   Extremities: No clubbing/cyanosis/1+ LE edema   Neurologic: Moves all four extremities. Full range of motion.   Skin: Warm and moist. The patient's skin has normal elasticity and good skin turgor.   Psychiatric: Appropriate mood and affect.  Musculoskeletal: Normal range of motion, normal strength        DATA    TELEMETRY: 	AF      ECG:  Afib with RVR	    < from: CT Chest No Cont (10.29.24 @ 09:53) >  IMPRESSION:  *  Bilateral pulmonary ground glass opacity consistent with edema versus   pneumonia.  *  Trace bilateral pleural effusions, ascites, and anasarca.  *  No evidence of acute infectious process in the abdomen and pelvis.  *  Bilateral renal cysts and right renal atrophy. No hydronephrosis.    < end of copied text >    < from: CT Head No Cont (10.29.24 @ 09:51) >  IMPRESSION:    No acute pathology.    < end of copied text >      < from: TTE W or WO Ultrasound Enhancing Agent (10.29.24 @ 13:17) >  CONCLUSIONS:      1. Left ventricular endocardium is not well visualized; however, the left ventricular systolic function appears grossly normal.   2. Enlarged right ventricular cavity size and reduced right ventricular systolic function.   3. There is calcification of the aortic valve leaflets. There is severe aortic stenosis. The peak transaortic velocity is 2.33 m/s, peak transaortic gradient is 21.8 mmHg and mean transaortic gradient is 13.3 mmHg with an LVOT/aortic valve VTI ratio of 0.39. The aortic valve area is estimated at 1.00 cm² by the continuity equation.   4. Structurally normal mitral valve with normal leaflet excursion. There is calcification of the mitral valve annulus. There is moderate to severe mitral regurgitation.   5. Structurally normal tricuspid valve with normal leaflet excursion. There is moderate to severe tricuspid regurgitation.   6. No pericardial effusion seen.   7. The inferior vena cava is dilated (dilated >2.1cm) with abnormal inspiratory collapse (abnormal <50%) consistent with elevated right atrial pressure (~15, range 10-20mmHg).   8. Technically difficult image quality.    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    #Rapid Afib  --in the setting of multiple medical issues (volume overload, elevated WBC from baseline, EDA, transaminitis)  --Toprol on hold, can resume Lopressor 25 bid if BP allows  --Eliquis on hold given supratherapeutic INR, s/p Vit K, Heme following    #Volume Overload  --pt with known valvular disease  --TTE with severe AS, mod-sev MR, mod-sev TR  --Recommend continue Lasix 80 IV BID  --strict I/Os  --clarify overall GOC and prognosis from heme perspective  --given severe valvular disease, and if within GOC then will d/w structural team about TAVR    #EDA  --Renal Following, no plans for HD at this time    # Elevated LFTs  --CT noted, F/U primary team    #AMS/ Encephalopathy  --resolving        Zara ROBERTS  [phone]

## 2024-10-31 LAB
ALBUMIN SERPL ELPH-MCNC: 3.1 G/DL — LOW (ref 3.3–5)
ALP SERPL-CCNC: 159 U/L — HIGH (ref 40–120)
ALT FLD-CCNC: 223 U/L — HIGH (ref 4–33)
ANION GAP SERPL CALC-SCNC: 16 MMOL/L — HIGH (ref 7–14)
APTT BLD: 56.1 SEC — HIGH (ref 24.5–35.6)
AST SERPL-CCNC: 116 U/L — HIGH (ref 4–32)
BILIRUB SERPL-MCNC: 2.5 MG/DL — HIGH (ref 0.2–1.2)
BUN SERPL-MCNC: 102 MG/DL — HIGH (ref 7–23)
CALCIUM SERPL-MCNC: 8.8 MG/DL — SIGNIFICANT CHANGE UP (ref 8.4–10.5)
CHLORIDE SERPL-SCNC: 98 MMOL/L — SIGNIFICANT CHANGE UP (ref 98–107)
CO2 SERPL-SCNC: 27 MMOL/L — SIGNIFICANT CHANGE UP (ref 22–31)
CREAT SERPL-MCNC: 3.74 MG/DL — HIGH (ref 0.5–1.3)
EGFR: 11 ML/MIN/1.73M2 — LOW
GLUCOSE SERPL-MCNC: 119 MG/DL — HIGH (ref 70–99)
HCT VFR BLD CALC: 29 % — LOW (ref 34.5–45)
HCT VFR BLD CALC: 29.2 % — LOW (ref 34.5–45)
HGB BLD-MCNC: 8.8 G/DL — LOW (ref 11.5–15.5)
HGB BLD-MCNC: 9 G/DL — LOW (ref 11.5–15.5)
INR BLD: 1.82 RATIO — HIGH (ref 0.85–1.16)
LDH SERPL L TO P-CCNC: 482 U/L — HIGH (ref 135–225)
MAGNESIUM SERPL-MCNC: 1.9 MG/DL — SIGNIFICANT CHANGE UP (ref 1.6–2.6)
MCHC RBC-ENTMCNC: 23.3 PG — LOW (ref 27–34)
MCHC RBC-ENTMCNC: 24.1 PG — LOW (ref 27–34)
MCHC RBC-ENTMCNC: 30.1 G/DL — LOW (ref 32–36)
MCHC RBC-ENTMCNC: 31 G/DL — LOW (ref 32–36)
MCV RBC AUTO: 77.2 FL — LOW (ref 80–100)
MCV RBC AUTO: 77.5 FL — LOW (ref 80–100)
MRSA PCR RESULT.: SIGNIFICANT CHANGE UP
NRBC # BLD: 2 /100 WBCS — HIGH (ref 0–0)
NRBC # BLD: 2 /100 WBCS — HIGH (ref 0–0)
NRBC # FLD: 0.82 K/UL — HIGH (ref 0–0)
NRBC # FLD: 0.97 K/UL — HIGH (ref 0–0)
PHOSPHATE SERPL-MCNC: 3.2 MG/DL — SIGNIFICANT CHANGE UP (ref 2.5–4.5)
PLATELET # BLD AUTO: 372 K/UL — SIGNIFICANT CHANGE UP (ref 150–400)
PLATELET # BLD AUTO: 487 K/UL — HIGH (ref 150–400)
POTASSIUM SERPL-MCNC: 3.8 MMOL/L — SIGNIFICANT CHANGE UP (ref 3.5–5.3)
POTASSIUM SERPL-SCNC: 3.8 MMOL/L — SIGNIFICANT CHANGE UP (ref 3.5–5.3)
PROT SERPL-MCNC: 5.3 G/DL — LOW (ref 6–8.3)
PROTHROM AB SERPL-ACNC: 21 SEC — HIGH (ref 9.9–13.4)
RBC # BLD: 3.74 M/UL — LOW (ref 3.8–5.2)
RBC # BLD: 3.78 M/UL — LOW (ref 3.8–5.2)
RBC # FLD: 24.8 % — HIGH (ref 10.3–14.5)
RBC # FLD: 25.6 % — HIGH (ref 10.3–14.5)
S AUREUS DNA NOSE QL NAA+PROBE: SIGNIFICANT CHANGE UP
SODIUM SERPL-SCNC: 141 MMOL/L — SIGNIFICANT CHANGE UP (ref 135–145)
URATE SERPL-MCNC: 3.1 MG/DL — SIGNIFICANT CHANGE UP (ref 2.5–7)
VIT B12 SERPL-MCNC: > 2000 PG/ML — SIGNIFICANT CHANGE UP (ref 200–900)
WBC # BLD: 39.78 K/UL — HIGH (ref 3.8–10.5)
WBC # BLD: 43.81 K/UL — CRITICAL HIGH (ref 3.8–10.5)
WBC # FLD AUTO: 39.78 K/UL — HIGH (ref 3.8–10.5)
WBC # FLD AUTO: 43.81 K/UL — CRITICAL HIGH (ref 3.8–10.5)

## 2024-10-31 PROCEDURE — 99233 SBSQ HOSP IP/OBS HIGH 50: CPT

## 2024-10-31 PROCEDURE — 99232 SBSQ HOSP IP/OBS MODERATE 35: CPT

## 2024-10-31 RX ORDER — HEPARIN SODIUM 10000 [USP'U]/ML
INJECTION INTRAVENOUS; SUBCUTANEOUS
Qty: 25000 | Refills: 0 | Status: DISCONTINUED | OUTPATIENT
Start: 2024-10-31 | End: 2024-11-05

## 2024-10-31 RX ORDER — HEPARIN SODIUM 10000 [USP'U]/ML
4500 INJECTION INTRAVENOUS; SUBCUTANEOUS EVERY 6 HOURS
Refills: 0 | Status: DISCONTINUED | OUTPATIENT
Start: 2024-10-31 | End: 2024-11-05

## 2024-10-31 RX ORDER — HEPARIN SODIUM 10000 [USP'U]/ML
2000 INJECTION INTRAVENOUS; SUBCUTANEOUS EVERY 6 HOURS
Refills: 0 | Status: DISCONTINUED | OUTPATIENT
Start: 2024-10-31 | End: 2024-11-05

## 2024-10-31 RX ADMIN — CHLORHEXIDINE GLUCONATE 1 APPLICATION(S): 40 SOLUTION TOPICAL at 14:03

## 2024-10-31 RX ADMIN — PHYTONADIONE 102 MILLIGRAM(S): 5 TABLET ORAL at 13:03

## 2024-10-31 RX ADMIN — Medication 2.5 MILLIGRAM(S): at 12:46

## 2024-10-31 RX ADMIN — Medication 2.5 MILLIGRAM(S): at 07:01

## 2024-10-31 RX ADMIN — PIPERACILLIN AND TAZOBACTAM 25 GRAM(S): .5; 4 INJECTION, POWDER, LYOPHILIZED, FOR SOLUTION INTRAVENOUS at 18:31

## 2024-10-31 RX ADMIN — Medication 100 MILLIGRAM(S): at 13:48

## 2024-10-31 RX ADMIN — Medication 80 MILLIGRAM(S): at 18:32

## 2024-10-31 RX ADMIN — Medication 2.5 MILLIGRAM(S): at 00:48

## 2024-10-31 RX ADMIN — Medication 80 MILLIGRAM(S): at 07:02

## 2024-10-31 RX ADMIN — PIPERACILLIN AND TAZOBACTAM 25 GRAM(S): .5; 4 INJECTION, POWDER, LYOPHILIZED, FOR SOLUTION INTRAVENOUS at 07:00

## 2024-10-31 RX ADMIN — Medication 2.5 MILLIGRAM(S): at 23:25

## 2024-10-31 RX ADMIN — HEPARIN SODIUM 1200 UNIT(S)/HR: 10000 INJECTION INTRAVENOUS; SUBCUTANEOUS at 21:12

## 2024-10-31 RX ADMIN — HEPARIN SODIUM 2000 UNIT(S): 10000 INJECTION INTRAVENOUS; SUBCUTANEOUS at 21:22

## 2024-10-31 RX ADMIN — HEPARIN SODIUM 1100 UNIT(S)/HR: 10000 INJECTION INTRAVENOUS; SUBCUTANEOUS at 13:46

## 2024-10-31 RX ADMIN — Medication 2.5 MILLIGRAM(S): at 18:23

## 2024-10-31 NOTE — PROGRESS NOTE ADULT - SUBJECTIVE AND OBJECTIVE BOX
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  Collateral obtained from son and daughter in law who is her PCP. She has been unwell for the past week, lives above them on the second floor and at baseline is independent and ambulatory, but has been in bed and with poor PO intake over the past week. Per son had significant leg edema preventing her from walking, also w/ poor PO intake. Appeared to have shortness of breath. Symptoms started last Monday.  In the ED was in afib w/ RVR. Labs significant for WBC 49, tbili 2.0, alk phos 219, AST 1198, , Cr. 4.3 (baseline mid 2s), metabolic acidosis with lactate elevation to 5, VBG 7.23/37/15/51, proBNP 35k, Trop 78 --> 69, Ca 8.2. Abdominal US with gallbladder wall thickening.   S/p zosyn, lasix 40 mg IVP x2, metoprolol 25mg, lopressor IV 5mg x3, sodium bicarb 50 IVP, diltiazem 30mg.    (29 Oct 2024 05:17)    Somnolent after given ativan.  Able to lie flat.  AFib was rapid on arrival, now rate-controlled after IV AVN blockers.  Not able to participate in HPI/ROS due to somnolence at this time.  10/30- sleeping in bed, no overnight issues.  Date of service 10/31- resting comfortably, no new issues.    PAST MEDICAL & SURGICAL HISTORY:  Thrombocytosis  Hypertension  Hyperlipidemia  Afib  CHF (congestive heart failure)  No significant past surgical history    allopurinol 100 milliGRAM(s) Oral daily  chlorhexidine 2% Cloths 1 Application(s) Topical daily  furosemide   Injectable 80 milliGRAM(s) IV Push two times a day  influenza  Vaccine (HIGH DOSE) 0.5 milliLiter(s) IntraMuscular once  metoprolol tartrate Injectable 2.5 milliGRAM(s) IV Push every 6 hours  phytonadione  IVPB 10 milliGRAM(s) IV Intermittent daily  piperacillin/tazobactam IVPB.. 3.375 Gram(s) IV Intermittent every 12 hours                            8.8    39.78 )-----------( 372      ( 31 Oct 2024 05:20 )             29.2       10-31    141  |  98  |  102[H]  ----------------------------<  119[H]  3.8   |  27  |  3.74[H]    Ca    8.8      31 Oct 2024 05:20  Phos  3.2     10-31  Mg     1.90     10-31    TPro  5.3[L]  /  Alb  3.1[L]  /  TBili  2.5[H]  /  DBili  x   /  AST  116[H]  /  ALT  223[H]  /  AlkPhos  159[H]  10-31    T(C): 36.9 (10-31-24 @ 11:19), Max: 36.9 (10-31-24 @ 06:28)  HR: 98 (10-31-24 @ 11:19) (70 - 111)  BP: 109/64 (10-31-24 @ 11:19) (108/67 - 122/75)  RR: 17 (10-31-24 @ 11:19) (16 - 18)  SpO2: 95% (10-31-24 @ 11:19) (94% - 100%)  Wt(kg): --    I&O's Summary    30 Oct 2024 07:01  -  31 Oct 2024 07:00  --------------------------------------------------------  IN: 0 mL / OUT: 500 mL / NET: -500 mL    Appearance: Normal size elderly woman, somnolent	  HEENT:   Normal oral mucosa, PERRL, EOMI	  Lymphatic: No lymphadenopathy , + edema in BL feet  Cardiovascular: irregular S1 S2, No JVD, No murmurs , Peripheral pulses palpable 2+ bilaterally  Respiratory: Lungs clear to auscultation, normal effort 	  Gastrointestinal:  Soft, Non-tender, + BS	  Skin: No rashes, No ecchymoses, No cyanosis, warm to touch  Musculoskeletal: No spontaneous movement due to sedation.  Psychiatry:  Mood & affect unable to determine due to sedation.    TELEMETRY: AF RVR --> rate controlled	    ECG: AF / RVR      ASSESSMENT/PLAN: Ms Guerin is an 84y Female brought in from home with altered mental status.  Hyperuricemia, uremia / acute kidney injury, ? of tumor lysis syndrome. WBC# nearly 50k.  AFib is under better rate control after IV diltiazem and metoprolol.  When more alert can use oral metoprolol. Target HR  at rest.  Consider anticoagulation with HEPARIN pending resolution of EDA, and potential for invasive procedures while hospitalized.  Dose of apixaban on discharge would be 2.5mg BID, as she is >80yo and weight is <60kg.  No invasive EP procedures planned.    Jero Waite M.D.  Cardiac Electrophysiology    office [phone]  pager [phone]

## 2024-10-31 NOTE — SWALLOW BEDSIDE ASSESSMENT ADULT - SWALLOW EVAL: DIAGNOSIS
1. Mild oral dysphagia for regular solids characterized by adequate acceptance and containment, slow mastication; however, able to adequately break down solids, suspect piecemeal transfer/ deglutition with mild oral residue post swallow which cleared independently with lingual sweep and liquid wash of thin liquids. 2. Functional oral stage for thin liquids characterized by adequate acceptance and containment, adequate anterior to posterior transport and adequate oral clearance. 3. Functional pharyngeal stage suspected for regular solids/ thin liquids characterized by initiation of the pharyngeal swallow and hyolaryngeal excursion upon digital palpation with no overt s/s penetration/ aspiration noted.

## 2024-10-31 NOTE — SWALLOW BEDSIDE ASSESSMENT ADULT - COMMENTS
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 Trace bilateral pleural effusions, ascites, and anasarca. No evidence of acute infectious process in the abdomen and pelvis. Bilateral renal cysts and right renal atrophy. No hydronephrosis.    CT Head 10/29: IMPRESSION: No acute pathology.    Patient received upright OOB in bedside chair awake/ alert. RN reports patient just ate regular breakfast tray without difficulty. Patient Ox3, able to follow simple directives and make basic wants/ needs known. Patient noted with hoarse/ breathy vocal quality.

## 2024-10-31 NOTE — SWALLOW BEDSIDE ASSESSMENT ADULT - ASR SWALLOW RECOMMEND DIAG
Cinesophagram to objectively assess swallow mechanism if concerned that chest imaging  pneumonia secondary to being dysphagia/ aspiration related versus edema

## 2024-10-31 NOTE — PROGRESS NOTE ADULT - SUBJECTIVE AND OBJECTIVE BOX
Date of service  10/31/24    no chest pain, SOB improved, overall more awake and alert, no abdominal pain.  ROS otherwise negative    MEDS  allopurinol 100 milliGRAM(s) Oral daily  chlorhexidine 2% Cloths 1 Application(s) Topical daily  furosemide   Injectable 80 milliGRAM(s) IV Push two times a day  heparin   Injectable 2000 Unit(s) IV Push every 6 hours PRN  heparin   Injectable 4500 Unit(s) IV Push every 6 hours PRN  heparin  Infusion.  Unit(s)/Hr IV Continuous <Continuous>  influenza  Vaccine (HIGH DOSE) 0.5 milliLiter(s) IntraMuscular once  metoprolol tartrate Injectable 2.5 milliGRAM(s) IV Push every 6 hours  piperacillin/tazobactam IVPB.. 3.375 Gram(s) IV Intermittent every 12 hours                          8.8    39.78 )-----------( 372      ( 31 Oct 2024 05:20 )             29.2     141  |  98  |  102[H]  ----------------------------<  119[H]  3.8   |  27  |  3.74[H]    Ca    8.8      31 Oct 2024 05:20  Phos  3.2     10-31  Mg     1.90     10-31    TPro  5.3[L]  /  Alb  3.1[L]  /  TBili  2.5[H]  /  DBili  x   /  AST  116[H]  /  ALT  223[H]  /  AlkPhos  159[H]  10-31      T(C): 36.9 (10-31-24 @ 11:19), Max: 36.9 (10-31-24 @ 06:28)  HR: 98 (10-31-24 @ 11:19) (70 - 111)  BP: 109/64 (10-31-24 @ 11:19) (109/64 - 122/75)  RR: 17 (10-31-24 @ 11:19) (16 - 18)  SpO2: 95% (10-31-24 @ 11:19) (94% - 100%)  Wt(kg): --    I&O's Summary    30 Oct 2024 07:01  -  31 Oct 2024 07:00  --------------------------------------------------------  IN: 0 mL / OUT: 500 mL / NET: -500 mL      General: Well nourished in no acute distress. Alert and Oriented * 3.   Head: Normocephalic and atraumatic.   Neck: + JVD. No bruits. Supple. Does not appear to be enlarged.   Cardiovascular: + S1,S2 ; RRR Soft systolic murmur at the left lower sternal border. No rubs noted.    Lungs: CTA b/l. No rhonchi, rales or wheezes.   Abdomen: + BS, soft. Non tender. Non distended. No rebound. No guarding.   Extremities: No clubbing/cyanosis/1+ LE edema   Neurologic: Moves all four extremities. Full range of motion.   Skin: Warm and moist. The patient's skin has normal elasticity and good skin turgor.   Psychiatric: Appropriate mood and affect.  Musculoskeletal: Normal range of motion, normal strength    DATA    TELEMETRY: 	AF      ECG:  Afib with RVR	    < from: CT Chest No Cont (10.29.24 @ 09:53) >  IMPRESSION:  *  Bilateral pulmonary ground glass opacity consistent with edema versus   pneumonia.  *  Trace bilateral pleural effusions, ascites, and anasarca.  *  No evidence of acute infectious process in the abdomen and pelvis.  *  Bilateral renal cysts and right renal atrophy. No hydronephrosis.    < end of copied text >    < from: CT Head No Cont (10.29.24 @ 09:51) >  IMPRESSION:    No acute pathology.    < end of copied text >      < from: TTE W or WO Ultrasound Enhancing Agent (10.29.24 @ 13:17) >  CONCLUSIONS:      1. Left ventricular endocardium is not well visualized; however, the left ventricular systolic function appears grossly normal.   2. Enlarged right ventricular cavity size and reduced right ventricular systolic function.   3. There is calcification of the aortic valve leaflets. There is severe aortic stenosis. The peak transaortic velocity is 2.33 m/s, peak transaortic gradient is 21.8 mmHg and mean transaortic gradient is 13.3 mmHg with an LVOT/aortic valve VTI ratio of 0.39. The aortic valve area is estimated at 1.00 cm² by the continuity equation.   4. Structurally normal mitral valve with normal leaflet excursion. There is calcification of the mitral valve annulus. There is moderate to severe mitral regurgitation.   5. Structurally normal tricuspid valve with normal leaflet excursion. There is moderate to severe tricuspid regurgitation.   6. No pericardial effusion seen.   7. The inferior vena cava is dilated (dilated >2.1cm) with abnormal inspiratory collapse (abnormal <50%) consistent with elevated right atrial pressure (~15, range 10-20mmHg).   8. Technically difficult image quality.    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    #Rapid Afib  --in the setting of multiple medical issues (volume overload, elevated WBC from baseline, EDA, transaminitis)  --Toprol on hold, can resume Lopressor 25 bid if BP allows  --Eliquis on hold given supratherapeutic INR, s/p Vit K, Heme following    #Volume Overload  --pt with known valvular disease  --TTE with severe AS, mod-sev MR, mod-sev TR  --Recommend continue Lasix 80 IV BID  --strict I/Os  --clarify overall GOC and prognosis from heme perspective  --given severe valvular disease, and if within GOC then will d/w structural team about TAVR    #EDA  --Renal Following, no plans for HD at this time    # Elevated LFTs  --CT noted, F/U primary team    #AMS/ Encephalopathy  --resolving      Zara ROBERTS  [phone]

## 2024-10-31 NOTE — PROGRESS NOTE ADULT - PROBLEM SELECTOR PLAN 9
Diet: s/p swallow rec regular diet  DVT ppx; on heparin drip  Dispo: inpt  Pall care consult appreciated, pt is full code   Left message for son Leno on 10/31      Plan discussed with ACP

## 2024-10-31 NOTE — PROGRESS NOTE ADULT - ASSESSMENT
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  CT abd/pelvis- no evidence of acute infectious process. right renal cyst contains mildly thick septations  abd US- 1.9 cm gallstone. Mild gallbladder wall thickening. No pericholecystic fluid. equivocal for cholecystitis.  s/p surgery eval- low suspicion for cholecystitis, not a surgical candidate  MRSA PCR negative    CHF, elevated liver enzymes  c/f congestive hepatopathy  b/l LE edema  pleural effusions, ascites, anasarca on CT  TTE- severe aortic stenosis, moderate to severe MR & TR    ET/MPN  leukocytosis likely significantly in part 2/2 ET/MPN but infection needs to be ruled out  LDH, uric acid elevated  - rasburicase given, on allopurinol    EDA  nephrology following    Recommendations  c/w zosyn   - complete 5 day empiric course for possible PNA w/ last day 11/2  trend temps, wbc    Tom Kirk M.D.  [facility], Division of Infectious Diseases  [phone]  After 5pm on weekdays and all day on weekends - please call [phone]

## 2024-10-31 NOTE — PROGRESS NOTE ADULT - SUBJECTIVE AND OBJECTIVE BOX
[facility] Division of Kidney Diseases & Hypertension  FOLLOW UP NOTE  [phone]--------------------------------------------------------------------------------    Chief Complaint: EDA on CKD, metabolic acidosis    24 hour events/subjective: Pt. was seen and evaluated at bedside earlier today. Pt. more awake, reports improvement in dyspnea, and BL LE edema. Offers no new complaints. Denies any headaches, fevers/chills, chest pain, and abdominal pain.    PAST HISTORY  --------------------------------------------------------------------------------  No significant changes to PMH, PSH, FHx, SHx, unless otherwise noted    ALLERGIES & MEDICATIONS  --------------------------------------------------------------------------------  Allergies    No Known Drug Allergies  eggs (Unknown)    Intolerances      Standing Inpatient Medications  allopurinol 100 milliGRAM(s) Oral daily  chlorhexidine 2% Cloths 1 Application(s) Topical daily  furosemide   Injectable 80 milliGRAM(s) IV Push two times a day  influenza  Vaccine (HIGH DOSE) 0.5 milliLiter(s) IntraMuscular once  metoprolol tartrate Injectable 2.5 milliGRAM(s) IV Push every 6 hours  phytonadione  IVPB 10 milliGRAM(s) IV Intermittent daily  piperacillin/tazobactam IVPB.. 3.375 Gram(s) IV Intermittent every 12 hours    PRN Inpatient Medications      REVIEW OF SYSTEMS  --------------------------------------------------------------------------------  Gen: No fevers/chills  Skin: No rashes  Head/Eyes/Ears/Mouth: No headache  Respiratory: +Dyspnea (improved)  CV: No chest pain  GI: No abdominal pain  MSK: +BL LE edema (improving)  Neuro: No dizziness/lightheadedness    All other systems were reviewed and are negative, except as noted.     VITALS/PHYSICAL EXAM  --------------------------------------------------------------------------------  T(C): 36.9 (10-31-24 @ 11:19), Max: 36.9 (10-31-24 @ 06:28)  HR: 98 (10-31-24 @ 11:19) (70 - 111)  BP: 109/64 (10-31-24 @ 11:19) (108/67 - 122/75)  RR: 17 (10-31-24 @ 11:19) (16 - 18)  SpO2: 95% (10-31-24 @ 11:19) (94% - 100%)  Wt(kg): --    10-30-24 @ 07:01  -  10-31-24 @ 07:00  --------------------------------------------------------  IN: 0 mL / OUT: 500 mL / NET: -500 mL    Physical Exam:  Gen: Chronically ill appearing but in NAD  HEENT: Anicteric  Pulm: Diminished B/L  CV: S1S2+  Abd: Soft, +BS    Ext: +Mild LE edema B/L (L>R), improving  Neuro: Awake  Skin: Warm and dry      LABS/STUDIES  --------------------------------------------------------------------------------              8.8    39.78 >-----------<  372      [10-31-24 @ 05:20]              29.2     141  |  98  |  102  ----------------------------<  119      [10-31-24 @ 05:20]  3.8   |  27  |  3.74        Ca     8.8     [10-31-24 @ 05:20]      Mg     1.90     [10-31-24 @ 05:20]      Phos  3.2     [10-31-24 @ 05:20]    TPro  5.3  /  Alb  3.1  /  TBili  2.5  /  DBili  x   /  AST  116  /  ALT  223  /  AlkPhos  159  [10-31-24 @ 05:20]    PT/INR: PT 21.0 , INR 1.82       [10-31-24 @ 05:20]  PTT: 31.2       [10-30-24 @ 05:43]    Uric acid 3.1      [10-31-24 @ 05:20]        [10-31-24 @ 05:20]    Creatinine Trend:  SCr 3.74 [10-31 @ 05:20]  SCr 3.69 [10-30 @ 15:02]  SCr 3.94 [10-30 @ 05:43]  SCr 4.39 [10-29 @ 17:18]  SCr 4.31 [10-29 @ 06:49]    Urine Creatinine 49      [10-29-24 @ 08:28]  Urine Protein 13      [10-29-24 @ 08:28]  Urine Sodium 68      [10-29-24 @ 08:28]  Urine Urea Nitrogen 387.0      [10-29-24 @ 08:28]  Urine Potassium 16.0      [10-29-24 @ 08:28]  Urine Osmolality 347      [10-29-24 @ 08:28]    TSH 3.38      [10-29-24 @ 06:49]    HCV 0.29, Nonreact      [10-29-24 @ 06:49]

## 2024-10-31 NOTE — PROGRESS NOTE ADULT - SUBJECTIVE AND OBJECTIVE BOX
[facility], Division of Infectious Diseases  JOSE Carter Y. Patel, S. Shah, G. Reagan  [phone]  ([phone] - weekdays after 5pm and weekends)    Name: TRIP MAGALLANES  Age/Gender: 84y Female  MRN: [id number]    Interval History:  Notes reviewed.   No concerning overnight events.  Afebrile.     Allergies: No Known Drug Allergies  eggs (Unknown)      Objective:  Vitals:   T(F): 98.5 (10-31-24 @ 11:19), Max: 98.5 (10-31-24 @ 11:19)  HR: 98 (10-31-24 @ 11:19) (70 - 111)  BP: 109/64 (10-31-24 @ 11:19) (108/67 - 122/75)  RR: 17 (10-31-24 @ 11:19) (16 - 18)  SpO2: 95% (10-31-24 @ 11:19) (94% - 100%)  Physical Examination:  General: no acute distress  HEENT: anicteric  Cardio: S1, S2, normal rate  Resp: clear to auscultation anteriorly  Abd: soft, NT, ND  Ext: b/l LE edema  Skin: warm, dry    Laboratory Studies:  CBC:                       8.8    39.78 )-----------( 372      ( 31 Oct 2024 05:20 )             29.2     WBC Trend:  39.78 10-31-24 @ 05:20  41.38 10-30-24 @ 05:43  43.83 10-29-24 @ 17:30  46.85 10-29-24 @ 10:12  47.70 10-29-24 @ 06:49  49.74 10-28-24 @ 17:24    CMP: 10-31    141  |  98  |  102[H]  ----------------------------<  119[H]  3.8   |  27  |  3.74[H]    Ca    8.8      31 Oct 2024 05:20  Phos  3.2     10-31  Mg     1.90     10-31    TPro  5.3[L]  /  Alb  3.1[L]  /  TBili  2.5[H]  /  DBili  x   /  AST  116[H]  /  ALT  223[H]  /  AlkPhos  159[H]  10-31      LIVER FUNCTIONS - ( 31 Oct 2024 05:20 )  Alb: 3.1 g/dL / Pro: 5.3 g/dL / ALK PHOS: 159 U/L / ALT: 223 U/L / AST: 116 U/L / GGT: x             Urinalysis Basic - ( 31 Oct 2024 05:20 )    Color: x / Appearance: x / SG: x / pH: x  Gluc: 119 mg/dL / Ketone: x  / Bili: x / Urobili: x   Blood: x / Protein: x / Nitrite: x   Leuk Esterase: x / RBC: x / WBC x   Sq Epi: x / Non Sq Epi: x / Bacteria: x      Microbiology: reviewed     Culture - Urine (collected 10-29-24 @ 09:31)  Source: Clean Catch Clean Catch (Midstream)  Final Report (10-30-24 @ 06:36):    <10,000 CFU/mL Normal Urogenital Kimberly    Culture - Blood (collected 10-28-24 @ 19:10)  Source: .Blood BLOOD  Preliminary Report (10-31-24 @ 01:02):    No growth at 48 Hours    Culture - Blood (collected 10-28-24 @ 19:05)  Source: .Blood BLOOD  Preliminary Report (10-31-24 @ 01:02):    No growth at 48 Hours        Radiology: reviewed     Medications:  allopurinol 100 milliGRAM(s) Oral daily  chlorhexidine 2% Cloths 1 Application(s) Topical daily  furosemide   Injectable 80 milliGRAM(s) IV Push two times a day  heparin   Injectable 2000 Unit(s) IV Push every 6 hours PRN  heparin   Injectable 4500 Unit(s) IV Push every 6 hours PRN  heparin  Infusion.  Unit(s)/Hr IV Continuous <Continuous>  influenza  Vaccine (HIGH DOSE) 0.5 milliLiter(s) IntraMuscular once  metoprolol tartrate Injectable 2.5 milliGRAM(s) IV Push every 6 hours  phytonadione  IVPB 10 milliGRAM(s) IV Intermittent daily  piperacillin/tazobactam IVPB.. 3.375 Gram(s) IV Intermittent every 12 hours    Antimicrobials:  piperacillin/tazobactam IVPB.. 3.375 Gram(s) IV Intermittent every 12 hours

## 2024-10-31 NOTE — SWALLOW BEDSIDE ASSESSMENT ADULT - ADDITIONAL RECOMMENDATIONS
1. This service to follow for diet tolerance as schedule permits. 2. Medical team advised to reconsult this service if patient is with a change in medical status of change in tolerance of recommended PO.

## 2024-10-31 NOTE — PROGRESS NOTE ADULT - SUBJECTIVE AND OBJECTIVE BOX
Patient is a 84y old  Female who presents with a chief complaint of shortness of breath and leg edema (31 Oct 2024 14:07)      SUBJECTIVE / OVERNIGHT EVENTS: Pt seen and examined at 12:05pm, no overnight events, pt's mental status has much improved, is alert, awake, answers questions, denies any complaints, No reports of any bleeding.         MEDICATIONS  (STANDING):  allopurinol 100 milliGRAM(s) Oral daily  chlorhexidine 2% Cloths 1 Application(s) Topical daily  furosemide   Injectable 80 milliGRAM(s) IV Push two times a day  heparin  Infusion.  Unit(s)/Hr (11 mL/Hr) IV Continuous <Continuous>  influenza  Vaccine (HIGH DOSE) 0.5 milliLiter(s) IntraMuscular once  metoprolol tartrate Injectable 2.5 milliGRAM(s) IV Push every 6 hours  piperacillin/tazobactam IVPB.. 3.375 Gram(s) IV Intermittent every 12 hours    MEDICATIONS  (PRN):  heparin   Injectable 4500 Unit(s) IV Push every 6 hours PRN For aPTT less than 40  heparin   Injectable 2000 Unit(s) IV Push every 6 hours PRN For aPTT between 40 - 57      Vital Signs Last 24 Hrs  T(C): 36.9 (31 Oct 2024 11:19), Max: 36.9 (31 Oct 2024 06:28)  T(F): 98.5 (31 Oct 2024 11:19), Max: 98.5 (31 Oct 2024 11:19)  HR: 98 (31 Oct 2024 11:19) (92 - 111)  BP: 109/64 (31 Oct 2024 11:19) (109/64 - 122/75)  BP(mean): --  RR: 17 (31 Oct 2024 11:19) (16 - 18)  SpO2: 95% (31 Oct 2024 11:19) (94% - 95%)    Parameters below as of 31 Oct 2024 11:19  Patient On (Oxygen Delivery Method): room air      CAPILLARY BLOOD GLUCOSE      POCT Blood Glucose.: 202 mg/dL (31 Oct 2024 17:38)    I&O's Summary    30 Oct 2024 07:01  -  31 Oct 2024 07:00  --------------------------------------------------------  IN: 0 mL / OUT: 500 mL / NET: -500 mL        PHYSICAL EXAM:  GENERAL: NAD  CHEST/LUNG: ctab  HEART: Irregularly irregular  ABDOMEN: Soft, Nontender, Nondistended  EXTREMITIES:  b/l LE edema+ L>R improving  PSYCH: calm now  NEUROLOGY: AA oriented to self, place ([facility]) yr 2024, month Oct      LABS:                        8.8    39.78 )-----------( 372      ( 31 Oct 2024 05:20 )             29.2     10-31    141  |  98  |  102[H]  ----------------------------<  119[H]  3.8   |  27  |  3.74[H]    Ca    8.8      31 Oct 2024 05:20  Phos  3.2     10-31  Mg     1.90     10-31    TPro  5.3[L]  /  Alb  3.1[L]  /  TBili  2.5[H]  /  DBili  x   /  AST  116[H]  /  ALT  223[H]  /  AlkPhos  159[H]  10-31    PT/INR - ( 31 Oct 2024 05:20 )   PT: 21.0 sec;   INR: 1.82 ratio         PTT - ( 30 Oct 2024 05:43 )  PTT:31.2 sec      Urinalysis Basic - ( 31 Oct 2024 05:20 )    Color: x / Appearance: x / SG: x / pH: x  Gluc: 119 mg/dL / Ketone: x  / Bili: x / Urobili: x   Blood: x / Protein: x / Nitrite: x   Leuk Esterase: x / RBC: x / WBC x   Sq Epi: x / Non Sq Epi: x / Bacteria: x        RADIOLOGY & ADDITIONAL TESTS:    Imaging Personally Reviewed:    Consultant(s) Notes Reviewed:      Care Discussed with Consultants/Other Providers:

## 2024-10-31 NOTE — CHART NOTE - NSCHARTNOTEFT_GEN_A_CORE
Medicine PA Note    Spoke with Dr. Lomeli from Hematology. Was recommended if we need to restart ac can start a heparin gtt and then transition to the DOAC when ready for discharge. It is better to monitor the INR and signs of bleeding than starting the DOAC now. Findings discussed with Dr. Maier.    Bashir Jones PA-C  x [phone]

## 2024-11-01 LAB
ACANTHOCYTES BLD QL SMEAR: SLIGHT — SIGNIFICANT CHANGE UP
ADD ON TEST-SPECIMEN IN LAB: SIGNIFICANT CHANGE UP
ALBUMIN SERPL ELPH-MCNC: 3.2 G/DL — LOW (ref 3.3–5)
ALP SERPL-CCNC: 153 U/L — HIGH (ref 40–120)
ALT FLD-CCNC: 171 U/L — HIGH (ref 4–33)
ANION GAP SERPL CALC-SCNC: 17 MMOL/L — HIGH (ref 7–14)
ANISOCYTOSIS BLD QL: SIGNIFICANT CHANGE UP
APTT BLD: 168.9 SEC — CRITICAL HIGH (ref 24.5–35.6)
APTT BLD: 184.5 SEC — CRITICAL HIGH (ref 24.5–35.6)
APTT BLD: 91 SEC — HIGH (ref 24.5–35.6)
AST SERPL-CCNC: 61 U/L — HIGH (ref 4–32)
BASOPHILS # BLD AUTO: 0.39 K/UL — HIGH (ref 0–0.2)
BASOPHILS NFR BLD AUTO: 0.9 % — SIGNIFICANT CHANGE UP (ref 0–2)
BILIRUB SERPL-MCNC: 2 MG/DL — HIGH (ref 0.2–1.2)
BUN SERPL-MCNC: 99 MG/DL — HIGH (ref 7–23)
BURR CELLS BLD QL SMEAR: PRESENT — SIGNIFICANT CHANGE UP
CALCIUM SERPL-MCNC: 8.9 MG/DL — SIGNIFICANT CHANGE UP (ref 8.4–10.5)
CHLORIDE SERPL-SCNC: 99 MMOL/L — SIGNIFICANT CHANGE UP (ref 98–107)
CO2 SERPL-SCNC: 27 MMOL/L — SIGNIFICANT CHANGE UP (ref 22–31)
CREAT SERPL-MCNC: 3.58 MG/DL — HIGH (ref 0.5–1.3)
EGFR: 12 ML/MIN/1.73M2 — LOW
ELLIPTOCYTES BLD QL SMEAR: SIGNIFICANT CHANGE UP
EOSINOPHIL # BLD AUTO: 0 K/UL — SIGNIFICANT CHANGE UP (ref 0–0.5)
EOSINOPHIL NFR BLD AUTO: 0 % — SIGNIFICANT CHANGE UP (ref 0–6)
GIANT PLATELETS BLD QL SMEAR: PRESENT — SIGNIFICANT CHANGE UP
GLUCOSE SERPL-MCNC: 118 MG/DL — HIGH (ref 70–99)
HCT VFR BLD CALC: 29.9 % — LOW (ref 34.5–45)
HGB BLD-MCNC: 9 G/DL — LOW (ref 11.5–15.5)
IANC: 31.99 K/UL — HIGH (ref 1.8–7.4)
LYMPHOCYTES # BLD AUTO: 2.65 K/UL — SIGNIFICANT CHANGE UP (ref 1–3.3)
LYMPHOCYTES # BLD AUTO: 6.1 % — LOW (ref 13–44)
MAGNESIUM SERPL-MCNC: 2 MG/DL — SIGNIFICANT CHANGE UP (ref 1.6–2.6)
MCHC RBC-ENTMCNC: 23.7 PG — LOW (ref 27–34)
MCHC RBC-ENTMCNC: 30.1 G/DL — LOW (ref 32–36)
MCV RBC AUTO: 78.7 FL — LOW (ref 80–100)
METAMYELOCYTES # FLD: 1.7 % — HIGH (ref 0–1)
MICROCYTES BLD QL: SIGNIFICANT CHANGE UP
MONOCYTES # BLD AUTO: 1.13 K/UL — HIGH (ref 0–0.9)
MONOCYTES NFR BLD AUTO: 2.6 % — SIGNIFICANT CHANGE UP (ref 2–14)
MYELOCYTES NFR BLD: 3.5 % — HIGH (ref 0–0)
NEUTROPHILS # BLD AUTO: 36.95 K/UL — HIGH (ref 1.8–7.4)
NEUTROPHILS NFR BLD AUTO: 79.1 % — HIGH (ref 43–77)
NEUTS BAND # BLD: 6.1 % — HIGH (ref 0–6)
NRBC # BLD: 1 /100 WBCS — HIGH (ref 0–0)
OVALOCYTES BLD QL SMEAR: SIGNIFICANT CHANGE UP
PHOSPHATE SERPL-MCNC: 3.7 MG/DL — SIGNIFICANT CHANGE UP (ref 2.5–4.5)
PLAT MORPH BLD: NORMAL — SIGNIFICANT CHANGE UP
PLATELET # BLD AUTO: 509 K/UL — HIGH (ref 150–400)
PLATELET COUNT - ESTIMATE: ABNORMAL
POIKILOCYTOSIS BLD QL AUTO: SIGNIFICANT CHANGE UP
POLYCHROMASIA BLD QL SMEAR: SIGNIFICANT CHANGE UP
POTASSIUM SERPL-MCNC: 3.9 MMOL/L — SIGNIFICANT CHANGE UP (ref 3.5–5.3)
POTASSIUM SERPL-SCNC: 3.9 MMOL/L — SIGNIFICANT CHANGE UP (ref 3.5–5.3)
PROT SERPL-MCNC: 5.9 G/DL — LOW (ref 6–8.3)
RBC # BLD: 3.8 M/UL — SIGNIFICANT CHANGE UP (ref 3.8–5.2)
RBC # FLD: 25.6 % — HIGH (ref 10.3–14.5)
RBC BLD AUTO: ABNORMAL
SCHISTOCYTES BLD QL AUTO: SLIGHT — SIGNIFICANT CHANGE UP
SODIUM SERPL-SCNC: 143 MMOL/L — SIGNIFICANT CHANGE UP (ref 135–145)
WBC # BLD: 43.37 K/UL — CRITICAL HIGH (ref 3.8–10.5)
WBC # FLD AUTO: 43.37 K/UL — CRITICAL HIGH (ref 3.8–10.5)

## 2024-11-01 PROCEDURE — 99232 SBSQ HOSP IP/OBS MODERATE 35: CPT

## 2024-11-01 PROCEDURE — 99233 SBSQ HOSP IP/OBS HIGH 50: CPT

## 2024-11-01 RX ADMIN — Medication 100 MILLIGRAM(S): at 13:44

## 2024-11-01 RX ADMIN — HEPARIN SODIUM 800 UNIT(S)/HR: 10000 INJECTION INTRAVENOUS; SUBCUTANEOUS at 19:31

## 2024-11-01 RX ADMIN — HEPARIN SODIUM 800 UNIT(S)/HR: 10000 INJECTION INTRAVENOUS; SUBCUTANEOUS at 23:32

## 2024-11-01 RX ADMIN — PIPERACILLIN AND TAZOBACTAM 25 GRAM(S): .5; 4 INJECTION, POWDER, LYOPHILIZED, FOR SOLUTION INTRAVENOUS at 17:08

## 2024-11-01 RX ADMIN — HEPARIN SODIUM 800 UNIT(S)/HR: 10000 INJECTION INTRAVENOUS; SUBCUTANEOUS at 15:19

## 2024-11-01 RX ADMIN — HEPARIN SODIUM 0 UNIT(S)/HR: 10000 INJECTION INTRAVENOUS; SUBCUTANEOUS at 14:19

## 2024-11-01 RX ADMIN — HEPARIN SODIUM 0 UNIT(S)/HR: 10000 INJECTION INTRAVENOUS; SUBCUTANEOUS at 05:13

## 2024-11-01 RX ADMIN — Medication 2.5 MILLIGRAM(S): at 17:08

## 2024-11-01 RX ADMIN — Medication 80 MILLIGRAM(S): at 13:43

## 2024-11-01 RX ADMIN — Medication 2.5 MILLIGRAM(S): at 23:06

## 2024-11-01 RX ADMIN — HEPARIN SODIUM 1000 UNIT(S)/HR: 10000 INJECTION INTRAVENOUS; SUBCUTANEOUS at 07:32

## 2024-11-01 RX ADMIN — HEPARIN SODIUM 1000 UNIT(S)/HR: 10000 INJECTION INTRAVENOUS; SUBCUTANEOUS at 06:20

## 2024-11-01 RX ADMIN — Medication 2.5 MILLIGRAM(S): at 05:22

## 2024-11-01 RX ADMIN — PIPERACILLIN AND TAZOBACTAM 25 GRAM(S): .5; 4 INJECTION, POWDER, LYOPHILIZED, FOR SOLUTION INTRAVENOUS at 05:18

## 2024-11-01 RX ADMIN — CHLORHEXIDINE GLUCONATE 1 APPLICATION(S): 40 SOLUTION TOPICAL at 13:50

## 2024-11-01 RX ADMIN — Medication 2.5 MILLIGRAM(S): at 13:44

## 2024-11-01 RX ADMIN — Medication 80 MILLIGRAM(S): at 05:23

## 2024-11-01 RX ADMIN — HEPARIN SODIUM 1000 UNIT(S)/HR: 10000 INJECTION INTRAVENOUS; SUBCUTANEOUS at 13:51

## 2024-11-01 NOTE — PHYSICAL THERAPY INITIAL EVALUATION ADULT - PERTINENT HX OF CURRENT PROBLEM, REHAB EVAL
Pt is an 84 year old female presenting from outpatient cardiologist office due to concern for fluid overload. Per son and daughter-in-law, patient has been unwell for the past week with poor PO intake, lack of mobility, significant leg edema preventing her from walking, and SOB. EKG significant for afib with RVR. US abdomen showed gallstone with mild gallbladder wall thickening; findings are equivocal for cholecystitis. Left LE US duplex negative for DVT. CT head negative for acute findings. CT chest showed bilateral pulmonary groundglass opacity consistent, trace bilateral pleural effusions, ascites, and anasarca. Pt admitted for CHF exacerbation, AMS, acute renal failure, and transaminitis.

## 2024-11-01 NOTE — PROGRESS NOTE ADULT - SUBJECTIVE AND OBJECTIVE BOX
DATE OF SERVICE: 11-01-24    Patient denies chest pain or shortness of breath.   Review of symptoms otherwise negative.    MEDICATIONS:  allopurinol 100 milliGRAM(s) Oral daily  chlorhexidine 2% Cloths 1 Application(s) Topical daily  furosemide   Injectable 80 milliGRAM(s) IV Push two times a day  heparin   Injectable 2000 Unit(s) IV Push every 6 hours PRN  heparin   Injectable 4500 Unit(s) IV Push every 6 hours PRN  heparin  Infusion.  Unit(s)/Hr IV Continuous <Continuous>  influenza  Vaccine (HIGH DOSE) 0.5 milliLiter(s) IntraMuscular once  metoprolol tartrate Injectable 2.5 milliGRAM(s) IV Push every 6 hours  piperacillin/tazobactam IVPB.. 3.375 Gram(s) IV Intermittent every 12 hours      LABS:                        9.0    43.37 )-----------( 509      ( 01 Nov 2024 03:40 )             29.9       Hemoglobin: 9.0 g/dL (11-01 @ 03:40)  Hemoglobin: 9.0 g/dL (10-31 @ 20:32)  Hemoglobin: 8.8 g/dL (10-31 @ 05:20)  Hemoglobin: 8.6 g/dL (10-30 @ 05:43)  Hemoglobin: 8.2 g/dL (10-29 @ 17:30)      11-01    143  |  99  |  99[H]  ----------------------------<  118[H]  3.9   |  27  |  3.58[H]    Ca    8.9      01 Nov 2024 03:40  Phos  3.7     11-01  Mg     2.00     11-01    TPro  5.9[L]  /  Alb  3.2[L]  /  TBili  2.0[H]  /  DBili  x   /  AST  61[H]  /  ALT  171[H]  /  AlkPhos  153[H]  11-01    Creatinine Trend: 3.58<--, 3.74<--, 3.69<--, 3.94<--, 4.39<--, 4.31<--    COAGS: PTT - ( 01 Nov 2024 03:40 )  PTT:184.5 sec          PHYSICAL EXAM:  T(C): 36.5 (11-01-24 @ 05:51), Max: 36.7 (10-31-24 @ 16:22)  HR: 90 (11-01-24 @ 05:51) (86 - 100)  BP: 136/75 (11-01-24 @ 05:51) (110/60 - 136/75)  RR: 18 (11-01-24 @ 05:51) (16 - 18)  SpO2: 95% (11-01-24 @ 05:51) (95% - 100%)  Wt(kg): --    I&O's Summary    31 Oct 2024 07:01  -  01 Nov 2024 07:00  --------------------------------------------------------  IN: 0 mL / OUT: 700 mL / NET: -700 mL          General: Well nourished in no acute distress. Alert and Oriented * 3.   Head: Normocephalic and atraumatic.   Neck: + JVD. No bruits. Supple. Does not appear to be enlarged.   Cardiovascular: + S1,S2 ; RRR Soft systolic murmur at the left lower sternal border. No rubs noted.    Lungs: CTA b/l. No rhonchi, rales or wheezes.   Abdomen: + BS, soft. Non tender. Non distended. No rebound. No guarding.   Extremities: No clubbing/cyanosis/1+ LE edema   Neurologic: Moves all four extremities. Full range of motion.   Skin: Warm and moist. The patient's skin has normal elasticity and good skin turgor.   Psychiatric: Appropriate mood and affect.  Musculoskeletal: Normal range of motion, normal strength    DATA    TELEMETRY: 	AF      ECG:  Afib with RVR	    < from: CT Chest No Cont (10.29.24 @ 09:53) >  IMPRESSION:  *  Bilateral pulmonary ground glass opacity consistent with edema versus   pneumonia.  *  Trace bilateral pleural effusions, ascites, and anasarca.  *  No evidence of acute infectious process in the abdomen and pelvis.  *  Bilateral renal cysts and right renal atrophy. No hydronephrosis.    < end of copied text >    < from: CT Head No Cont (10.29.24 @ 09:51) >  IMPRESSION:    No acute pathology.    < end of copied text >      < from: TTE W or WO Ultrasound Enhancing Agent (10.29.24 @ 13:17) >  CONCLUSIONS:      1. Left ventricular endocardium is not well visualized; however, the left ventricular systolic function appears grossly normal.   2. Enlarged right ventricular cavity size and reduced right ventricular systolic function.   3. There is calcification of the aortic valve leaflets. There is severe aortic stenosis. The peak transaortic velocity is 2.33 m/s, peak transaortic gradient is 21.8 mmHg and mean transaortic gradient is 13.3 mmHg with an LVOT/aortic valve VTI ratio of 0.39. The aortic valve area is estimated at 1.00 cm² by the continuity equation.   4. Structurally normal mitral valve with normal leaflet excursion. There is calcification of the mitral valve annulus. There is moderate to severe mitral regurgitation.   5. Structurally normal tricuspid valve with normal leaflet excursion. There is moderate to severe tricuspid regurgitation.   6. No pericardial effusion seen.   7. The inferior vena cava is dilated (dilated >2.1cm) with abnormal inspiratory collapse (abnormal <50%) consistent with elevated right atrial pressure (~15, range 10-20mmHg).   8. Technically difficult image quality.    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    #Rapid Afib  -- in the setting of multiple medical issues (volume overload, elevated WBC from baseline, EDA, transaminitis)  -- resume Lopressor 25 bid   -- Eliquis on hold given supratherapeutic INR, s/p Vit K, Heme following    #Volume Overload  -- pt with known valvular disease  -- TTE with severe AS, mod-sev MR, mod-sev TR  -- continue Lasix 80 IV BID  -- strict I/Os  -- clarify overall GOC and prognosis from heme perspective  -- given severe valvular disease, and if within GOC then will d/w structural team about TAVR    #EDA  -- Renal Following, no plans for HD at this time    # Elevated LFTs  -- CT noted, F/U primary team    #AMS/ Encephalopathy  -- resolving    Jesenia Hills MD  Pager: [phone]

## 2024-11-01 NOTE — PROGRESS NOTE ADULT - SUBJECTIVE AND OBJECTIVE BOX
[facility] Division of Kidney Diseases & Hypertension  FOLLOW UP NOTE  [phone]--------------------------------------------------------------------------------    Chief Complaint: EDA on CKD, metabolic acidosis    24 hour events/subjective: Pt. was seen and evaluated at bedside earlier today. Pt. more awake, reports improvement in dyspnea, and BL LE edema. Offers no new complaints. Denies any headaches, fevers/chills, chest pain, and abdominal pain.    PAST HISTORY  --------------------------------------------------------------------------------  No significant changes to PMH, PSH, FHx, SHx, unless otherwise noted    ALLERGIES & MEDICATIONS  --------------------------------------------------------------------------------  Allergies    No Known Drug Allergies  eggs (Unknown)    Intolerances      Standing Inpatient Medications  allopurinol 100 milliGRAM(s) Oral daily  chlorhexidine 2% Cloths 1 Application(s) Topical daily  furosemide   Injectable 80 milliGRAM(s) IV Push two times a day  heparin  Infusion.  Unit(s)/Hr IV Continuous <Continuous>  influenza  Vaccine (HIGH DOSE) 0.5 milliLiter(s) IntraMuscular once  metoprolol tartrate Injectable 2.5 milliGRAM(s) IV Push every 6 hours  piperacillin/tazobactam IVPB.. 3.375 Gram(s) IV Intermittent every 12 hours    PRN Inpatient Medications  heparin   Injectable 2000 Unit(s) IV Push every 6 hours PRN  heparin   Injectable 4500 Unit(s) IV Push every 6 hours PRN      REVIEW OF SYSTEMS  --------------------------------------------------------------------------------  Gen: No fevers/chills  Skin: No rashes  Head/Eyes/Ears/Mouth: No headache  Respiratory: +Dyspnea (improved)  CV: No chest pain  GI: No abdominal pain  MSK: +BL LE edema (improving)  Neuro: No dizziness/lightheadedness    All other systems were reviewed and are negative, except as noted.     VITALS/PHYSICAL EXAM  --------------------------------------------------------------------------------  T(C): 36.5 (11-01-24 @ 05:51), Max: 36.7 (10-31-24 @ 16:22)  HR: 90 (11-01-24 @ 05:51) (86 - 100)  BP: 136/75 (11-01-24 @ 05:51) (110/60 - 136/75)  RR: 18 (11-01-24 @ 05:51) (16 - 18)  SpO2: 95% (11-01-24 @ 05:51) (95% - 100%)  Wt(kg): --    10-31-24 @ 07:01  -  11-01-24 @ 07:00  --------------------------------------------------------  IN: 0 mL / OUT: 700 mL / NET: -700 mL    Physical Exam:  Gen: Chronically ill appearing but in NAD  HEENT: Anicteric  Pulm: Diminished B/L  CV: S1S2+  Abd: Soft, +BS    Ext: +Mild LE edema B/L (L>R), improving  Neuro: Awake  Skin: Warm and dry    LABS/STUDIES  --------------------------------------------------------------------------------              9.0    43.37 >-----------<  509      [11-01-24 @ 03:40]              29.9     143  |  99  |  99  ----------------------------<  118      [11-01-24 @ 03:40]  3.9   |  27  |  3.58        Ca     8.9     [11-01-24 @ 03:40]      Mg     2.00     [11-01-24 @ 03:40]      Phos  3.7     [11-01-24 @ 03:40]    TPro  5.9  /  Alb  3.2  /  TBili  2.0  /  DBili  x   /  AST  61  /  ALT  171  /  AlkPhos  153  [11-01-24 @ 03:40]    PT/INR: PT 21.0 , INR 1.82       [10-31-24 @ 05:20]  PTT: 184.5      [11-01-24 @ 03:40]    Uric acid 3.1      [10-31-24 @ 05:20]        [10-31-24 @ 05:20]    Creatinine Trend:  SCr 3.58 [11-01 @ 03:40]  SCr 3.74 [10-31 @ 05:20]  SCr 3.69 [10-30 @ 15:02]  SCr 3.94 [10-30 @ 05:43]  SCr 4.39 [10-29 @ 17:18]    Urine Creatinine 49      [10-29-24 @ 08:28]  Urine Protein 13      [10-29-24 @ 08:28]  Urine Sodium 68      [10-29-24 @ 08:28]  Urine Urea Nitrogen 387.0      [10-29-24 @ 08:28]  Urine Potassium 16.0      [10-29-24 @ 08:28]  Urine Osmolality 347      [10-29-24 @ 08:28]    TSH 3.38      [10-29-24 @ 06:49]    HCV 0.29, Nonreact      [10-29-24 @ 06:49]

## 2024-11-01 NOTE — PROGRESS NOTE ADULT - ASSESSMENT
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  CT abd/pelvis- no evidence of acute infectious process. right renal cyst contains mildly thick septations  abd US- 1.9 cm gallstone. Mild gallbladder wall thickening. No pericholecystic fluid. equivocal for cholecystitis.  s/p surgery eval- low suspicion for cholecystitis, not a surgical candidate  MRSA PCR negative    CHF, elevated liver enzymes  c/f congestive hepatopathy  b/l LE edema  pleural effusions, ascites, anasarca on CT  TTE- severe aortic stenosis, moderate to severe MR & TR    ET/MPN  leukocytosis likely significantly in part 2/2 ET/MPN  LDH, uric acid elevated  - rasburicase given, on allopurinol    EDA  nephrology following    Recommendations  c/w zosyn   - complete 5 day empiric course for possible PNA w/ last day 11/2    Dr. Alexis Quevedo will be covering 11/2-11/3. Dr. Ramona Valle will be covering 11/4-11/6. I will resume care 11/7.     Tom Kirk M.D.  [facility], Division of Infectious Diseases  [phone]  After 5pm on weekdays and all day on weekends - please call [phone]

## 2024-11-01 NOTE — PROGRESS NOTE ADULT - SUBJECTIVE AND OBJECTIVE BOX
INTERVAL HPI/OVERNIGHT EVENTS:  No overnight events.     MEDICATIONS  (STANDING):  allopurinol 100 milliGRAM(s) Oral daily  chlorhexidine 2% Cloths 1 Application(s) Topical daily  furosemide   Injectable 80 milliGRAM(s) IV Push two times a day  heparin  Infusion.  Unit(s)/Hr (11 mL/Hr) IV Continuous <Continuous>  influenza  Vaccine (HIGH DOSE) 0.5 milliLiter(s) IntraMuscular once  metoprolol tartrate Injectable 2.5 milliGRAM(s) IV Push every 6 hours  piperacillin/tazobactam IVPB.. 3.375 Gram(s) IV Intermittent every 12 hours    MEDICATIONS  (PRN):  heparin   Injectable 2000 Unit(s) IV Push every 6 hours PRN For aPTT between 40 - 57  heparin   Injectable 4500 Unit(s) IV Push every 6 hours PRN For aPTT less than 40    Allergies    No Known Drug Allergies  eggs (Unknown)    Intolerances          VITAL SIGNS:  T(F): 97.7 (11-01-24 @ 05:51)  HR: 90 (11-01-24 @ 05:51)  BP: 136/75 (11-01-24 @ 05:51)  RR: 18 (11-01-24 @ 05:51)  SpO2: 95% (11-01-24 @ 05:51)  Wt(kg): --    PHYSICAL EXAM:    GENERAL: NAD  CHEST/LUNG: Decreased bs bilaterally  HEART: Irregularly irregulr  ABDOMEN: Soft, Nontender, Nondistended  EXTREMITIES:  b/l LE edema+  PSYCH: calm now  NEUROLOGY: Arousable, able to say her first name only, does not engage in further conversation    LABS:                        9.0    43.37 )-----------( 509      ( 01 Nov 2024 03:40 )             29.9     11-01    143  |  99  |  99[H]  ----------------------------<  118[H]  3.9   |  27  |  3.58[H]    Ca    8.9      01 Nov 2024 03:40  Phos  3.7     11-01  Mg     2.00     11-01    TPro  5.9[L]  /  Alb  3.2[L]  /  TBili  2.0[H]  /  DBili  x   /  AST  61[H]  /  ALT  171[H]  /  AlkPhos  153[H]  11-01    PT/INR - ( 31 Oct 2024 05:20 )   PT: 21.0 sec;   INR: 1.82 ratio         PTT - ( 01 Nov 2024 03:40 )  PTT:184.5 sec  Urinalysis Basic - ( 01 Nov 2024 03:40 )    Color: x / Appearance: x / SG: x / pH: x  Gluc: 118 mg/dL / Ketone: x  / Bili: x / Urobili: x   Blood: x / Protein: x / Nitrite: x   Leuk Esterase: x / RBC: x / WBC x   Sq Epi: x / Non Sq Epi: x / Bacteria: x        RADIOLOGY & ADDITIONAL TESTS:  Studies reviewed.

## 2024-11-01 NOTE — PROGRESS NOTE ADULT - ASSESSMENT
85 y/o woman w/ JAK2 positive ET/MPN followed at [facility] by Dr. Waller on cytoreductive therapy w/ anagrelide who presents with shortness of breath and edema. Hematology consulted for hx of MPN in the setting of leukocytosis of 49K. Differential mainly neutrophilic, no reported blast on differential. Elevated LFTs and pro-BNP along with EDA on CKD, elevated uric acid and elevated lactate and INR > 10 on labs.     Labs today: WBC 43.3, Hgb 9.0,  (rising from 308 on 10/30/24)  - For elevated INR (normal PT and aPPT) s/p Vit K 10 mg x 3 days.     Recommendations:  # JAK2 positive Essential Thrombocythemia (positive for 17.7% of cells on BM bx 7/6/20)  -Suspect leukocytosis most likely reactive from acute illness.   -Recommend infectious w/u (blood cultures, CXR, UA, urine culture) and abx per primary team given elevated lactate and WBC count. Would cover for intra-abdominal source given elevated LFTs.   -Can hold anagrelide while acute process being worked up.   -Agree with nephro eval for EDA.   -If bleeding is suspected, can give FFP or KCentra.   - Appreciate nephro recommendations for EDA workup. If nephrology is not performing dialysis would recommend giving rasburicase 6 mg IV x 1 and starting allopurinol 100 mg daily and IVFs given hyperuriciemia  - Please hold the home eliquis to work up GIB. If AC is required and GIB stops, can start hep ggt while inpatient and monitor for bleeding.  - Will follow with Dr. Waller at [facility] upon hospital discharge.      NOTE INCOMPLETE UNTIL ATTENDING SIGNS    ***************************************************************  Maritza Lomeli, PGY5  Fellow Hematology/Oncology  pager: [phone]   Available on Microsoft Teams  After 5pm or on weekends please contact  to page on-call fellow   ***************************************************************     DISCHARGE

## 2024-11-01 NOTE — PROVIDER CONTACT NOTE (CRITICAL VALUE NOTIFICATION) - PERSON GIVING RESULT:
Progress Notes by Geovanny Man DO at 12/12/2019 11:30 AM     Author: Geovanny Man DO Service: -- Author Type: Physician    Filed: 12/12/2019  3:30 PM Encounter Date: 12/12/2019 Status: Signed    : Geovanny Man DO (Physician)       Chief Complaint   Patient presents with   ? Facial Pain     L side face numbness x 12 hours, eye watering          History of Present Illness: Rooming staff notes reviewed. Patient states he noticed symptoms at about 7 PM last evening, with facial drooping on left side, increased tears in the left eye, and numbness on the inside of his mouth on the left side.  He has not had any recent headache, change in vision, or cognitive problems to his knowledge.  He does not have any discomfort in his left eye.  No complaint of any extremity muscle weakness or change in sensation.  On discussion of his elevated blood pressure noted at exam, he states he has not been to see a doctor in a long time, and has no known prior history of elevated blood pressure.    Review of systems: See history of present illness, all others negative.     Current Outpatient Medications   Medication Sig Dispense Refill   ? methylPREDNISolone (MEDROL DOSEPACK) 4 mg tablet Follow package directions 21 tablet 0   ? valACYclovir (VALTREX) 1000 MG tablet Take 1 tablet (1,000 mg total) by mouth 3 (three) times a day for 7 days. 20 tablet 0     No current facility-administered medications for this visit.      No past medical history on file.   No past surgical history on file.   Social History     Tobacco Use   ? Smoking status: Never Smoker   ? Smokeless tobacco: Never Used   Substance Use Topics   ? Alcohol use: Not on file   ? Drug use: Not on file        No family history on file.    Vitals:    12/12/19 1143 12/12/19 1145   BP: (!) 160/112 (!) 156/112   Pulse: 98    Resp: 12    Temp: 98  F (36.7  C)    TempSrc: Oral    SpO2: 98%    Weight: 170 lb 1.6 oz (77.2 kg)      Wt Readings from Last 3 Encounters: 
  12/12/19 170 lb (77.1 kg)   12/12/19 170 lb 1.6 oz (77.2 kg)     Temp Readings from Last 3 Encounters:   12/12/19 98.1  F (36.7  C) (Oral)   12/12/19 98  F (36.7  C) (Oral)     BP Readings from Last 3 Encounters:   12/12/19 (!) 143/104   12/12/19 (!) 156/112     Pulse Readings from Last 3 Encounters:   12/12/19 83   12/12/19 98       EXAM:   General: Vital signs reviewed. Patient is in no acute appearing distress with a normal pleasant affect. Breathing is non labored appearing. Patient is alert and oriented x 3.  Speech is clear and fluent.     HEENT exam: Patient has a mild facial droop from his left orbit, through his left nasolabial fold region.  It is more notable when smiling.  He can still open and close his eyelids normally in both eyes.  He has a little increased clear tears in his left eye.  No scleral discoloration noted bilaterally.  Pupils are equal round and reactive to light and normal size.  Patient has normal consensual active eye movement in all 6 cardinal directions, and normal eye convergence with accommodation.  No periorbital edema or discoloration noted to the left eye.  Neck: supple with no adenoapthy.    Assessment/Plan   1. Facial droop     2. Hypertension, unspecified type         Patient Instructions   I think you likely have Bell's palsy, but the combination of these symptoms, plus your hypertension warrants a more in-depth evaluation in the emergency department I believe.  I spoke to the physician on duty at [facility] ER about your recent symptoms, and my exam findings.  They will continue your management there.  See handout for treatment advice.    Patient Education     High Blood Pressure, To Be Confirmed, No Treatment  Your blood pressure today was higher than normal. Sometimes anxiety or pain can cause a temporary rise in blood pressure. It later returns to normal. Blood pressure that is high only one time doesnt mean that you have high blood pressure (hypertension). 
High blood pressure is a chronic illness. But you should have your blood pressure measured again within the next few days to find out if its still high.    Blood pressure measurements are given as 2 numbers. Systolic blood pressure is the upper number. This is the pressure when the heart contracts. Diastolic blood pressure is the lower number. This is the pressure when the heart relaxes between beats. You will see your blood pressure readings written together. For example, a person with a systolic pressure of 118 and a diastolic pressure of 78 will have 118/78 written in the medical record.  Blood pressure is categorized as normal, elevated, or stage 1 or stage 2 high blood pressure:    Normal blood pressure is systolic of less than 120 and diastolic of less than 80 (120/80)    Elevated blood pressure is systolic of 120 to 129 and diastolic less than 80    Stage 1 high blood pressure is systolic is 130 to 139 or diastolic between 80 to 89    Stage 2 high blood pressure is when systolic is 140 or higher or the diastolic is 90 or higher  Lifestyle changes such as weight loss, exercise, and quitting smoking, can help manage your blood pressure. Have your blood pressure checked regularly to be sure it is under control.  Home care  To track your blood pressure, your provider may ask you to come into the office at different times and on different days. If your healthcare provider asks you to check your readings at home, ask him or her what times of the day to test and for how many days. Before you leave the office, ask your provider to show you how to take your blood pressure and be sure to ask questions if you don't understand something.  Consider buying an automatic blood pressure monitor. Ask your provider for a recommendation as well as the proper size cuff to fit your arm. You can buy blood pressure monitors at most pharmacies.  The American Heart Association recommends the following guidelines for home blood 
pressure monitoring:    Don't smoke or drink coffee or other caffeinated drinks for 30 minutes before taking your blood pressure.    Go to the bathroom before the test.    Relax for 5 minutes before taking the measurement.    Sit with your back supported (don't sit on a couch or soft chair); keep your feet on the floor uncrossed. Place your arm on a solid flat surface (like a table) with the upper part of the arm at heart level. Place the middle of the cuff directly above the bend of the elbow. Check the monitor's instruction manual for an illustration.    Take multiple readings. When you measure, take 2 to 3 readings one minute apart and record all of the results.    Take your blood pressure at the same time every day, or as your healthcare provider recommends.    Record the date, time, and blood pressure reading.    Take the record with you to your next medical appointment. If your blood pressure monitor has a built-in memory, simply take the monitor with you to your next appointment.    Call your provider if you have several high readings. Don't be frightened by a single high blood pressure reading, but if you get several high readings, check in with your healthcare provider.    Note: When blood pressure reaches a systolic (top number) of 180 or higher OR diastolic (bottom number) of 110 or higher, seek emergency medical treatment.  Follow-up care  Keep all of your follow up appointments. If your blood pressure is more than 120 over 80 on 2 out of 3 days, you will need to follow up with your healthcare provider for more evaluation and treatment.  Dont put this off! High blood pressure can be treated. High blood pressure thats not treated raises your risk for heart attack, heart failure, and stroke.  When to seek medical advice  Call your healthcare provider right away if any of these occur:    Blood pressure reaches a systolic (top number) of 180 or higher, OR diastolic (bottom number) of 110 or higher    Chest 
VENESSA Gallegos
pain or shortness of breath    Severe headache    Throbbing or rushing sound in the ears    Nosebleed    Sudden severe pain in your belly (abdomen)    Extreme drowsiness, confusion, or fainting    Dizziness or dizziness with spinning sensation (vertigo)    Weakness of an arm or leg or one side of the face    You have problems speaking or seeing   Date Last Reviewed: 12/1/2016 2000-2017 The [employer]. [address] [place] [address], [place] [address]. All rights reserved. This information is not intended as a substitute for professional medical care. Always follow your healthcare professional's instructions.           Patient Education     Hinckley Palsy    Valldaares's Palsy is a problem involving the nerve that controls the muscles on one side of the face.  The cause is unknown, but may be related to inflammation of the nerve. Symptoms usually appear only on the affected side. They may include:    Inability to close the eyelid    Tearing of the eye    Facial drooping    Drooling    Numbness or pain    Changes in taste    Sound sensitivity  Damage to the eye can be a serious problem. The inability to blink can cause the eye to dry out. An ulcer (sore) can then form on the cornea. Also, not blinking means that the eye has no protection from dirt and dust particles.  Treatment involves protecting and moistening the eye. Medicines may also help.  Most persons recover completely within 3 to 6 months. However, the condition sometimes returns months or years later.  Home care    Get plenty of rest and eat a healthy diet to help yourself recover.    Use Artificial Tears frequently during the day and at bedtime to prevent drying. These drops are available without prescription at your drug store.    Wear protective glasses especially when outside to protect from flying debris. Use sunglasses when outdoors.    Tape the eyelid closed at bedtime with a paper tape (available at your pharmacy). This has a very mild adhesive to 
avoid injury to the lid. This will protect your eye from injury while you sleep.    Sometimes medicines are prescribed to reduce inflammation or treat specific viral infections of the nerve. If medicines are prescribed, take them exactly as directed. Usually there is a 10-day course of medication that is started as soon as possible. Taking this medicine as prescribed will help with a full recovery.    Use low heat, for example from a heating pad, on the affected area. This can help reduce pain and swelling.    If you are experiencing sever pain, contact your health care provider.  Follow-up care  Follow up with your healthcare provider as advised. If you referred to a specialist, make that appointment promptly.  When to seek medical advice  Call your healthcare provider if any of the following occur:    Severe eye redness    Eye pain    Thick drainage from the eye    Change in vision (such as double vision or losing vision)    Fever over 100.4 F (38 C) or as directed by your healthcare provider    Headache, neck pain, weakness, trouble speaking or walking, or other unexplained symptoms  Date Last Reviewed: 8/23/2015 2000-2017 [employer]. [address] [place] [id number]. All rights reserved. This information is not intended as a substitute for professional medical care. Always follow your healthcare professional's instructions.              Geovanny Man,        
toxicology
TREY Danielson

## 2024-11-01 NOTE — DIETITIAN INITIAL EVALUATION ADULT - PERTINENT LABORATORY DATA
11-01    143  |  99  |  99[H]  ----------------------------<  118[H]  3.9   |  27  |  3.58[H]    Ca    8.9      01 Nov 2024 03:40  Phos  3.7     11-01  Mg     2.00     11-01    TPro  5.9[L]  /  Alb  3.2[L]  /  TBili  2.0[H]  /  DBili  x   /  AST  61[H]  /  ALT  171[H]  /  AlkPhos  153[H]  11-01  POCT Blood Glucose.: 212 mg/dL (10-31-24 @ 21:21)

## 2024-11-01 NOTE — PROVIDER CONTACT NOTE (CRITICAL VALUE NOTIFICATION) - ASSESSMENT
vitals stable   no acute distress noted   fall safety maintained
patient asymptomatic
Patient asymptomatic, vitals stable

## 2024-11-01 NOTE — PHYSICAL THERAPY INITIAL EVALUATION ADULT - ADDITIONAL COMMENTS
Patient resides on the second floor of a private house; son and daughter-in-law reside on the first floor. There is 1 flight of steps to enter.

## 2024-11-01 NOTE — PROGRESS NOTE ADULT - PROBLEM SELECTOR PLAN 9
Diet: s/p swallow rec regular diet  DVT ppx; on heparin drip  Dispo: inpt  Pall care consult appreciated, pt is full code   Left message for son Leno on 10/31      Plan discussed with ACP Diet: s/p swallow rec regular diet  DVT ppx; on heparin drip  Dispo: inpt  Pall care consult appreciated, pt is full code   PT rec rehab, family will make a decision about rehab  Updated son Leno and daughter in law (PCP) on 11/1 and answered all questions      Plan discussed with ACP

## 2024-11-01 NOTE — PROGRESS NOTE ADULT - SUBJECTIVE AND OBJECTIVE BOX
[facility], Division of Infectious Diseases  JOSE Carter Y. Patel, S. Shah, G. Reagan  [phone]  ([phone] - weekdays after 5pm and weekends)    Name: TRIP MAGALLANES  Age/Gender: 84y Female  MRN: [id number]    Interval History:  Notes reviewed.   No concerning overnight events.  Afebrile.     Allergies: No Known Drug Allergies  eggs (Unknown)      Objective:  Vitals:   T(F): 97.7 (11-01-24 @ 05:51), Max: 98.5 (10-31-24 @ 11:19)  HR: 90 (11-01-24 @ 05:51) (86 - 100)  BP: 136/75 (11-01-24 @ 05:51) (109/64 - 136/75)  RR: 18 (11-01-24 @ 05:51) (16 - 18)  SpO2: 95% (11-01-24 @ 05:51) (95% - 100%)  Physical Examination:  General: no acute distress  HEENT: anicteric  Cardio: S1, S2, normal rate  Resp: clear to auscultation anteriorly  Abd: soft, NT, ND  Ext: b/l LE edema, L foot without erythema, mild tenderness on dorsal medial L foot  Skin: warm, dry    Laboratory Studies:  CBC:                       9.0    43.37 )-----------( 509      ( 01 Nov 2024 03:40 )             29.9     WBC Trend:  43.37 11-01-24 @ 03:40  43.81 10-31-24 @ 20:32  39.78 10-31-24 @ 05:20  41.38 10-30-24 @ 05:43  43.83 10-29-24 @ 17:30  46.85 10-29-24 @ 10:12  47.70 10-29-24 @ 06:49  49.74 10-28-24 @ 17:24    CMP: 11-01    143  |  99  |  99[H]  ----------------------------<  118[H]  3.9   |  27  |  3.58[H]    Ca    8.9      01 Nov 2024 03:40  Phos  3.7     11-01  Mg     2.00     11-01    TPro  5.9[L]  /  Alb  3.2[L]  /  TBili  2.0[H]  /  DBili  x   /  AST  61[H]  /  ALT  171[H]  /  AlkPhos  153[H]  11-01      LIVER FUNCTIONS - ( 01 Nov 2024 03:40 )  Alb: 3.2 g/dL / Pro: 5.9 g/dL / ALK PHOS: 153 U/L / ALT: 171 U/L / AST: 61 U/L / GGT: x             Urinalysis Basic - ( 01 Nov 2024 03:40 )    Color: x / Appearance: x / SG: x / pH: x  Gluc: 118 mg/dL / Ketone: x  / Bili: x / Urobili: x   Blood: x / Protein: x / Nitrite: x   Leuk Esterase: x / RBC: x / WBC x   Sq Epi: x / Non Sq Epi: x / Bacteria: x      Microbiology: reviewed     Culture - Urine (collected 10-29-24 @ 09:31)  Source: Clean Catch Clean Catch (Midstream)  Final Report (10-30-24 @ 06:36):    <10,000 CFU/mL Normal Urogenital Kimberly    Culture - Blood (collected 10-28-24 @ 19:10)  Source: .Blood BLOOD  Preliminary Report (11-01-24 @ 01:01):    No growth at 72 Hours    Culture - Blood (collected 10-28-24 @ 19:05)  Source: .Blood BLOOD  Preliminary Report (11-01-24 @ 01:01):    No growth at 72 Hours        Radiology: reviewed     Medications:  allopurinol 100 milliGRAM(s) Oral daily  chlorhexidine 2% Cloths 1 Application(s) Topical daily  furosemide   Injectable 80 milliGRAM(s) IV Push two times a day  heparin   Injectable 2000 Unit(s) IV Push every 6 hours PRN  heparin   Injectable 4500 Unit(s) IV Push every 6 hours PRN  heparin  Infusion.  Unit(s)/Hr IV Continuous <Continuous>  influenza  Vaccine (HIGH DOSE) 0.5 milliLiter(s) IntraMuscular once  metoprolol tartrate Injectable 2.5 milliGRAM(s) IV Push every 6 hours  piperacillin/tazobactam IVPB.. 3.375 Gram(s) IV Intermittent every 12 hours    Antimicrobials:  piperacillin/tazobactam IVPB.. 3.375 Gram(s) IV Intermittent every 12 hours

## 2024-11-01 NOTE — PHYSICAL THERAPY INITIAL EVALUATION ADULT - GENERAL OBSERVATIONS, REHAB EVAL
Upon entry, pt semi-supine in bed in NAD; + telemetry.  bpm. Pt left as received with all tubes/lines intact, bed alarm on, call bell in reach and in NAD.

## 2024-11-01 NOTE — PROVIDER CONTACT NOTE (CRITICAL VALUE NOTIFICATION) - BACKGROUND
patient admitted from cardiologist office due to concern for fluid overload   PMHX CHF, Afib, HLD, HTN
(Admit Diagnosis) Atrial fibrillation  (PMH) CHF (congestive heart failure)  (PMH) Afib
Patient is an 83 y/o F admitted for atrial fibrillation

## 2024-11-01 NOTE — DIETITIAN INITIAL EVALUATION ADULT - NSICDXPASTMEDICALHX_GEN_ALL_CORE_FT
88
PAST MEDICAL HISTORY:  Afib     CHF (congestive heart failure)     Hyperlipidemia     Hypertension     Thrombocytosis

## 2024-11-01 NOTE — PROVIDER CONTACT NOTE (CRITICAL VALUE NOTIFICATION) - ACTION/TREATMENT ORDERED:
ACP notified, follow nomogram - stop for 1 hour and decrease by 2 mL. new rate of 8 mL/hr
pending orders
ACP made aware, awaiting order

## 2024-11-01 NOTE — PROGRESS NOTE ADULT - SUBJECTIVE AND OBJECTIVE BOX
Patient is a 84y old  Female who presents with a chief complaint of shortness of breath and leg edema (01 Nov 2024 14:18)        SUBJECTIVE / OVERNIGHT EVENTS: Pt seen and examined at 12:15pm, no overnight events, is alert, awake, answers questions, denies any complaints, No reports of any bleeding.         MEDICATIONS  (STANDING):  allopurinol 100 milliGRAM(s) Oral daily  chlorhexidine 2% Cloths 1 Application(s) Topical daily  furosemide   Injectable 80 milliGRAM(s) IV Push two times a day  heparin  Infusion.  Unit(s)/Hr (11 mL/Hr) IV Continuous <Continuous>  influenza  Vaccine (HIGH DOSE) 0.5 milliLiter(s) IntraMuscular once  metoprolol tartrate Injectable 2.5 milliGRAM(s) IV Push every 6 hours  piperacillin/tazobactam IVPB.. 3.375 Gram(s) IV Intermittent every 12 hours    MEDICATIONS  (PRN):  heparin   Injectable 2000 Unit(s) IV Push every 6 hours PRN For aPTT between 40 - 57  heparin   Injectable 4500 Unit(s) IV Push every 6 hours PRN For aPTT less than 40      Vital Signs Last 24 Hrs  T(C): 36.3 (01 Nov 2024 13:06), Max: 36.7 (31 Oct 2024 16:22)  T(F): 97.4 (01 Nov 2024 13:06), Max: 98 (31 Oct 2024 16:22)  HR: 111 (01 Nov 2024 13:06) (86 - 111)  BP: 106/60 (01 Nov 2024 13:06) (106/60 - 136/75)  BP(mean): --  RR: 17 (01 Nov 2024 13:06) (16 - 18)  SpO2: 99% (01 Nov 2024 13:06) (95% - 100%)    Parameters below as of 01 Nov 2024 13:06  Patient On (Oxygen Delivery Method): room air      CAPILLARY BLOOD GLUCOSE      POCT Blood Glucose.: 212 mg/dL (31 Oct 2024 21:21)  POCT Blood Glucose.: 202 mg/dL (31 Oct 2024 17:38)    I&O's Summary    31 Oct 2024 07:01  -  01 Nov 2024 07:00  --------------------------------------------------------  IN: 0 mL / OUT: 700 mL / NET: -700 mL    01 Nov 2024 07:01  -  01 Nov 2024 15:12  --------------------------------------------------------  IN: 570 mL / OUT: 0 mL / NET: 570 mL        PHYSICAL EXAM:  GENERAL: NAD  CHEST/LUNG: ctab  HEART: Irregularly irregular  ABDOMEN: Soft, Nontender, Nondistended  EXTREMITIES:  b/l LE edema+ L>R improving  PSYCH: calm now  NEUROLOGY: AA answers questions appropriately        LABS:                        9.0    43.37 )-----------( 509      ( 01 Nov 2024 03:40 )             29.9     11-01    143  |  99  |  99[H]  ----------------------------<  118[H]  3.9   |  27  |  3.58[H]    Ca    8.9      01 Nov 2024 03:40  Phos  3.7     11-01  Mg     2.00     11-01    TPro  5.9[L]  /  Alb  3.2[L]  /  TBili  2.0[H]  /  DBili  x   /  AST  61[H]  /  ALT  171[H]  /  AlkPhos  153[H]  11-01    PT/INR - ( 31 Oct 2024 05:20 )   PT: 21.0 sec;   INR: 1.82 ratio         PTT - ( 01 Nov 2024 12:44 )  PTT:168.9 sec      Urinalysis Basic - ( 01 Nov 2024 03:40 )    Color: x / Appearance: x / SG: x / pH: x  Gluc: 118 mg/dL / Ketone: x  / Bili: x / Urobili: x   Blood: x / Protein: x / Nitrite: x   Leuk Esterase: x / RBC: x / WBC x   Sq Epi: x / Non Sq Epi: x / Bacteria: x        RADIOLOGY & ADDITIONAL TESTS:    Imaging Personally Reviewed:    Consultant(s) Notes Reviewed:      Care Discussed with Consultants/Other Providers:

## 2024-11-01 NOTE — DIETITIAN INITIAL EVALUATION ADULT - OTHER INFO
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     Patient reports consuming >75% of breakfast this morning. Per RN flow sheet, noted with 2 intake of % documented. Per H&P dated 10/29, patient has poor po intake for a week PTA. Patient has dentures with her, denies any difficulty chewing or swallowing, any nausea, vomiting, diarrhea, constipation during visit. Reports last bowel movement 11/1. Per swallow eval dated 10/31, recommended regular, thin liquid diet consistency. Current weight: 57.2kg/126.1lbs (10/28, per chart). No weight history on [facility] at this time. Compared current weight with the reported usual body weight, patient has weight gain of +3.1lbs/+2.5%BW. Noted patient has 3+edema to bilateral legs, could cause weight changes, continue to monitor weight trend.

## 2024-11-01 NOTE — DIETITIAN INITIAL EVALUATION ADULT - ADD RECOMMEND
1. Recommend change diet to low sodium. Fluid needs defer to team.    2. Nutrition department to provide mighty shake 4oz 1x/day (220kcal, 6gm pro) for nutrient support.   3. Monitor weight, labs, po intake and tolerance, bowel movement, skin integrity.   4. Encourage PO intake and honor food preferences as able.

## 2024-11-01 NOTE — PROGRESS NOTE ADULT - SUBJECTIVE AND OBJECTIVE BOX
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  Collateral obtained from son and daughter in law who is her PCP. She has been unwell for the past week, lives above them on the second floor and at baseline is independent and ambulatory, but has been in bed and with poor PO intake over the past week. Per son had significant leg edema preventing her from walking, also w/ poor PO intake. Appeared to have shortness of breath. Symptoms started last Monday.  In the ED was in afib w/ RVR. Labs significant for WBC 49, tbili 2.0, alk phos 219, AST 1198, , Cr. 4.3 (baseline mid 2s), metabolic acidosis with lactate elevation to 5, VBG 7.23/37/15/51, proBNP 35k, Trop 78 --> 69, Ca 8.2. Abdominal US with gallbladder wall thickening.   S/p zosyn, lasix 40 mg IVP x2, metoprolol 25mg, lopressor IV 5mg x3, sodium bicarb 50 IVP, diltiazem 30mg.    (29 Oct 2024 05:17)    Somnolent after given ativan.  Able to lie flat.  AFib was rapid on arrival, now rate-controlled after IV AVN blockers.  Not able to participate in HPI/ROS due to somnolence at this time.  Date of service 11/1- no overnight issues.    PAST MEDICAL & SURGICAL HISTORY:  Thrombocytosis  Hypertension  Hyperlipidemia  Afib  CHF (congestive heart failure)  No significant past surgical history    allopurinol 100 milliGRAM(s) Oral daily  chlorhexidine 2% Cloths 1 Application(s) Topical daily  furosemide   Injectable 80 milliGRAM(s) IV Push two times a day  heparin   Injectable 2000 Unit(s) IV Push every 6 hours PRN  heparin   Injectable 4500 Unit(s) IV Push every 6 hours PRN  heparin  Infusion.  Unit(s)/Hr IV Continuous <Continuous>  influenza  Vaccine (HIGH DOSE) 0.5 milliLiter(s) IntraMuscular once  metoprolol tartrate Injectable 2.5 milliGRAM(s) IV Push every 6 hours  piperacillin/tazobactam IVPB.. 3.375 Gram(s) IV Intermittent every 12 hours                            9.0    43.37 )-----------( 509      ( 01 Nov 2024 03:40 )             29.9       11-01    143  |  99  |  99[H]  ----------------------------<  118[H]  3.9   |  27  |  3.58[H]    Ca    8.9      01 Nov 2024 03:40  Phos  3.7     11-01  Mg     2.00     11-01    TPro  5.9[L]  /  Alb  3.2[L]  /  TBili  2.0[H]  /  DBili  x   /  AST  61[H]  /  ALT  171[H]  /  AlkPhos  153[H]  11-01      T(C): 36.3 (11-01-24 @ 13:06), Max: 36.7 (10-31-24 @ 16:22)  HR: 111 (11-01-24 @ 13:06) (86 - 111)  BP: 106/60 (11-01-24 @ 13:06) (106/60 - 136/75)  RR: 17 (11-01-24 @ 13:06) (16 - 18)  SpO2: 99% (11-01-24 @ 13:06) (95% - 100%)  Wt(kg): --    I&O's Summary    31 Oct 2024 07:01  -  01 Nov 2024 07:00  --------------------------------------------------------  IN: 0 mL / OUT: 700 mL / NET: -700 mL    01 Nov 2024 07:01  -  01 Nov 2024 14:19  --------------------------------------------------------  IN: 570 mL / OUT: 0 mL / NET: 570 mL      Appearance: Normal size elderly woman, somnolent after sedation.	  HEENT:   Normal oral mucosa, PERRL, EOMI	  Lymphatic: No lymphadenopathy , + edema in BL feet  Cardiovascular: irregular S1 S2, No JVD, No murmurs , Peripheral pulses palpable 2+ bilaterally  Respiratory: Lungs clear to auscultation, normal effort 	  Gastrointestinal:  Soft, Non-tender, + BS	  Skin: No rashes, No ecchymoses, No cyanosis, warm to touch  Musculoskeletal: No spontaneous movement due to sedation.  Psychiatry:  Mood & affect unable to determine due to sedation.    TELEMETRY: AF RVR --> rate controlled	    ECG: AF / RVR  	    ASSESSMENT/PLAN: Ms Guerin is an 84y Female brought in from home with altered mental status.  When awake, can resume oral metoprolol 50mg PO Q6hrs, with holding parameters for HR<50 at rest.  Consider anticoagulation with HEPARIN pending resolution of EDA, and potential for invasive procedures while hospitalized.  Dose of apixaban on discharge would be 2.5mg BID, as she is >78yo and weight is <60kg.  No invasive EP procedures planned.    Jero Waite M.D.  Cardiac Electrophysiology    office [phone]  pager [phone]

## 2024-11-01 NOTE — PHYSICAL THERAPY INITIAL EVALUATION ADULT - GAIT DEVIATIONS NOTED, PT EVAL
forward flexed posture with downward gaze; lack of reciprocal arm swing/decreased sanjuana/decreased velocity of limb motion/decreased step length/decreased stride length/decreased weight-shifting ability

## 2024-11-01 NOTE — DIETITIAN INITIAL EVALUATION ADULT - ORAL INTAKE PTA/DIET HISTORY
Patient reports usual body weight 123lbs, denies any recent weight changes. Patient cooks and prepares meals by herself at home, has good appetite, does not follow any special diet. Patient confirms eggs allergies during visit.

## 2024-11-02 ENCOUNTER — TRANSCRIPTION ENCOUNTER (OUTPATIENT)
Age: 84
End: 2024-11-02

## 2024-11-02 LAB
ALBUMIN SERPL ELPH-MCNC: 2.9 G/DL — LOW (ref 3.3–5)
ALP SERPL-CCNC: 126 U/L — HIGH (ref 40–120)
ALT FLD-CCNC: 109 U/L — HIGH (ref 4–33)
ANION GAP SERPL CALC-SCNC: 15 MMOL/L — HIGH (ref 7–14)
APTT BLD: 97.6 SEC — HIGH (ref 24.5–35.6)
AST SERPL-CCNC: 29 U/L — SIGNIFICANT CHANGE UP (ref 4–32)
BASOPHILS # BLD AUTO: 0.46 K/UL — HIGH (ref 0–0.2)
BASOPHILS NFR BLD AUTO: 1.4 % — SIGNIFICANT CHANGE UP (ref 0–2)
BILIRUB SERPL-MCNC: 1.4 MG/DL — HIGH (ref 0.2–1.2)
BUN SERPL-MCNC: 90 MG/DL — HIGH (ref 7–23)
CALCIUM SERPL-MCNC: 8.5 MG/DL — SIGNIFICANT CHANGE UP (ref 8.4–10.5)
CHLORIDE SERPL-SCNC: 97 MMOL/L — LOW (ref 98–107)
CO2 SERPL-SCNC: 27 MMOL/L — SIGNIFICANT CHANGE UP (ref 22–31)
CREAT SERPL-MCNC: 3.25 MG/DL — HIGH (ref 0.5–1.3)
EGFR: 13 ML/MIN/1.73M2 — LOW
EOSINOPHIL # BLD AUTO: 0.22 K/UL — SIGNIFICANT CHANGE UP (ref 0–0.5)
EOSINOPHIL NFR BLD AUTO: 0.7 % — SIGNIFICANT CHANGE UP (ref 0–6)
GLUCOSE SERPL-MCNC: 102 MG/DL — HIGH (ref 70–99)
HCT VFR BLD CALC: 28.6 % — LOW (ref 34.5–45)
HGB BLD-MCNC: 8.7 G/DL — LOW (ref 11.5–15.5)
IANC: 23.9 K/UL — HIGH (ref 1.8–7.4)
IMM GRANULOCYTES NFR BLD AUTO: 9.8 % — HIGH (ref 0–0.9)
INR BLD: 1.31 RATIO — HIGH (ref 0.85–1.16)
LYMPHOCYTES # BLD AUTO: 10.7 % — LOW (ref 13–44)
LYMPHOCYTES # BLD AUTO: 3.49 K/UL — HIGH (ref 1–3.3)
MAGNESIUM SERPL-MCNC: 1.9 MG/DL — SIGNIFICANT CHANGE UP (ref 1.6–2.6)
MCHC RBC-ENTMCNC: 24 PG — LOW (ref 27–34)
MCHC RBC-ENTMCNC: 30.4 G/DL — LOW (ref 32–36)
MCV RBC AUTO: 78.8 FL — LOW (ref 80–100)
MONOCYTES # BLD AUTO: 1.44 K/UL — HIGH (ref 0–0.9)
MONOCYTES NFR BLD AUTO: 4.4 % — SIGNIFICANT CHANGE UP (ref 2–14)
NEUTROPHILS # BLD AUTO: 23.9 K/UL — HIGH (ref 1.8–7.4)
NEUTROPHILS NFR BLD AUTO: 73 % — SIGNIFICANT CHANGE UP (ref 43–77)
NRBC # BLD: 0 /100 WBCS — SIGNIFICANT CHANGE UP (ref 0–0)
NRBC # FLD: 0.17 K/UL — HIGH (ref 0–0)
PHOSPHATE SERPL-MCNC: 3.7 MG/DL — SIGNIFICANT CHANGE UP (ref 2.5–4.5)
PLATELET # BLD AUTO: 590 K/UL — HIGH (ref 150–400)
POTASSIUM SERPL-MCNC: 4 MMOL/L — SIGNIFICANT CHANGE UP (ref 3.5–5.3)
POTASSIUM SERPL-SCNC: 4 MMOL/L — SIGNIFICANT CHANGE UP (ref 3.5–5.3)
PROT SERPL-MCNC: 5.5 G/DL — LOW (ref 6–8.3)
PROTHROM AB SERPL-ACNC: 15.1 SEC — HIGH (ref 9.9–13.4)
RBC # BLD: 3.63 M/UL — LOW (ref 3.8–5.2)
RBC # FLD: 26.3 % — HIGH (ref 10.3–14.5)
SODIUM SERPL-SCNC: 139 MMOL/L — SIGNIFICANT CHANGE UP (ref 135–145)
WBC # BLD: 32.71 K/UL — HIGH (ref 3.8–10.5)
WBC # FLD AUTO: 32.71 K/UL — HIGH (ref 3.8–10.5)

## 2024-11-02 PROCEDURE — 99232 SBSQ HOSP IP/OBS MODERATE 35: CPT

## 2024-11-02 RX ADMIN — PIPERACILLIN AND TAZOBACTAM 25 GRAM(S): .5; 4 INJECTION, POWDER, LYOPHILIZED, FOR SOLUTION INTRAVENOUS at 17:05

## 2024-11-02 RX ADMIN — Medication 80 MILLIGRAM(S): at 05:55

## 2024-11-02 RX ADMIN — Medication 2.5 MILLIGRAM(S): at 17:05

## 2024-11-02 RX ADMIN — Medication 80 MILLIGRAM(S): at 13:53

## 2024-11-02 RX ADMIN — HEPARIN SODIUM 800 UNIT(S)/HR: 10000 INJECTION INTRAVENOUS; SUBCUTANEOUS at 07:33

## 2024-11-02 RX ADMIN — Medication 100 MILLIGRAM(S): at 13:53

## 2024-11-02 RX ADMIN — HEPARIN SODIUM 800 UNIT(S)/HR: 10000 INJECTION INTRAVENOUS; SUBCUTANEOUS at 19:06

## 2024-11-02 RX ADMIN — CHLORHEXIDINE GLUCONATE 1 APPLICATION(S): 40 SOLUTION TOPICAL at 13:54

## 2024-11-02 RX ADMIN — Medication 2.5 MILLIGRAM(S): at 23:29

## 2024-11-02 RX ADMIN — PIPERACILLIN AND TAZOBACTAM 25 GRAM(S): .5; 4 INJECTION, POWDER, LYOPHILIZED, FOR SOLUTION INTRAVENOUS at 05:56

## 2024-11-02 RX ADMIN — HEPARIN SODIUM 800 UNIT(S)/HR: 10000 INJECTION INTRAVENOUS; SUBCUTANEOUS at 07:01

## 2024-11-02 RX ADMIN — Medication 2.5 MILLIGRAM(S): at 05:55

## 2024-11-02 RX ADMIN — HEPARIN SODIUM 800 UNIT(S)/HR: 10000 INJECTION INTRAVENOUS; SUBCUTANEOUS at 23:30

## 2024-11-02 NOTE — DISCHARGE NOTE PROVIDER - NSDCFUSCHEDAPPT_GEN_ALL_CORE_FT
[facility]  Scheduled Appointment: 01/27/2025    Aisha Waller [facility]  Scheduled Appointment: 01/27/2025

## 2024-11-02 NOTE — DISCHARGE NOTE PROVIDER - HOSPITAL COURSE
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    AMS (altered mental status).    -Ddx uremia from EDA vs. toxic metabolic encephalopathy from sepsis vs. hepatic encephalopathy   -Per family patient's baseline is independent and she is usually conversational and expresses her needs. Here she is only responding yes/no to some questions and not following commands, per family this is not her baseline   -CTH showed No acute pathology.  -ammonia wnl  -mental status much improved.     Acute kidney injury superimposed on CKD.    -Cr acutely increased to 4.28, on 10/14 was ~2.5  -Atrophic kidney seen on abdominal ultrasound, renal function improving  -Ddx progressive CKD vs. cardiorenal syndrome vs. infection    -Monitor I&O's  -lactate improved, creatinine improving  -per renal No urgent indications for HD currently but will continue to monitor. dc bicarb drip  - Nephro following, appreciate recs.     Coagulopathy.    Elevated lfts, INR of 10.6 on 10/29, now improved to 1.31 today, no reports of any bleeding, vit k x3 days  Heme follg discussed with heme fellow on 10/29 per fellow will give vit k now, hod off on ffp  improving.     CHF exacerbation.   -C/f CHF exacerbation given dyspnea and edema, with liver and renal failure c/f hepatorenal or cardiorenal from congestion  -CXR appears negative for pulmonary edema or consolidations but does have crackles on exam and pitting edema; not hypoxic   -S/p lasix 40mg x2  -Cont lasix IV 80 mg BID   [] TTE showed left ventricular systolic function appears grossly normal.  severe aortic stenosis. Moderate to severe mitral regurgitation. There is moderate to severe tricuspid regurgitation.  -Discussed with cards attending on 10/31, cards discussed with family about TAVR, to be f/u as outpt  -LLE doppler neg for dvt.     Afib.   -Afib w/ RVR in the ED, requiring multiple doses of lopressor, diltiazem, metoprolol  -EP follg, will give metoprolol low dose as bp tolerates,  -on heparin drip.    : Metabolic acidosis.    -i/s/o EDA   -Improved  -dc bicarb per renal recs.     Transaminitis.   -Significant elevation in AST/ALT 1198/603, prior normal 2 weeks ago  -Ddx infection vs. hepatorenal vs. toxin  -Per chart review patient was on a statin that she stopped taking because of myalgias  -Also w/ gallbladder wall edema c/f cholecystitis on US, but exam is negative  -- pattern of liver injury is not cholestatic either  -Surgery following, no acute intervention at this time but recommend HIDA if continued concern for tomy  lfts improving.    Leukocytosis.   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  -Trace bilateral pleural effusions, ascites, and anasarca. No evidence of acute infectious process in the abdomen and pelvis. Bilateral renal cysts and right renal atrophy. No hydronephrosis.  -abd ultrasound showed 1.9cm Gall stone and equivocal for cholecystitis. seen by sx, per sx Not a surgical candidate at this time.     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  Collateral obtained from son and daughter in law who is her PCP. She has been unwell for the past week, lives above them on the second floor and at baseline is independent and ambulatory, but has been in bed and with poor PO intake over the past week. Per son had significant leg edema preventing her from walking, also w/ poor PO intake. Appeared to have shortness of breath. Symptoms started last Monday.  In the ED was in afib w/ RVR. Labs significant for WBC 49, tbili 2.0, alk phos 219, AST 1198, , Cr. 4.3 (baseline mid 2s), metabolic acidosis with lactate elevation to 5, VBG 7.23/37/15/51, proBNP 35k, Trop 78 --> 69, Ca 8.2. Abdominal US with gallbladder wall thickening.   S/p zosyn, lasix 40 mg IVP x2, metoprolol 25mg, lopressor IV 5mg x3, sodium bicarb 50 IVP, diltiazem 30mg.    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 Creatinine improved with diuresis. Empirically treated for pneumonia with zosyn. Heme consulted for leukocytosis i/s/o JAK2 positive ET/MPN, recommended switching to hydroxyurea as pt developed CHF while on anagrelide.     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  Collateral obtained from son and daughter in law who is her PCP. She has been unwell for the past week, lives above them on the second floor and at baseline is independent and ambulatory, but has been in bed and with poor PO intake over the past week. Per son had significant leg edema preventing her from walking, also w/ poor PO intake. Appeared to have shortness of breath. Symptoms started last Monday.  In the ED was in afib w/ RVR. Labs significant for WBC 49, tbili 2.0, alk phos 219, AST 1198, , Cr. 4.3 (baseline mid 2s), metabolic acidosis with lactate elevation to 5, VBG 7.23/37/15/51, proBNP 35k, Trop 78 --> 69, Ca 8.2. Abdominal US with gallbladder wall thickening.   S/p zosyn, lasix 40 mg IVP x2, metoprolol 25mg, lopressor IV 5mg x3, sodium bicarb 50 IVP, diltiazem 30mg.    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 Creatinine improved with diuresis. Empirically treated for pneumonia with zosyn. Heme consulted for leukocytosis i/s/o JAK2 positive ET/MPN, recommended switching anagrelide to hydroxyurea as pt developed CHF while on anagrelide.     Important Medication Changes and Reason:  switching anagrelide to hydroxyurea 500mg every other day as pt developed CHF while on anagrelide.     Active or Pending Issues Requiring Follow-up: monitor platelets and Hb BIW at [facility], f/u heme in 2 weeks    Advanced Directives:   [x] Full code  [ ] DNR  [ ] Hospice    Discharge Diagnoses:  metabolic encephalopathy  ATN 2/2 poor renal perfusion 2/2 cardiorenal syndrome  coagulopathy  acute on chronic HFpEF  severe aortic stenosis  chronic atrial fibrillation with RVR  secondary hypercoagulable state  congestive hepatopathy   JAK2 positive ET/MPN  thrombocytosis and leukocytosis  bilateral pneumonia

## 2024-11-02 NOTE — PROGRESS NOTE ADULT - SUBJECTIVE AND OBJECTIVE BOX
DATE OF SERVICE: 11-02-24    Patient denies chest pain or states her shortness of breath is better.   Review of symptoms otherwise negative.    MEDICATIONS:  allopurinol 100 milliGRAM(s) Oral daily  chlorhexidine 2% Cloths 1 Application(s) Topical daily  furosemide   Injectable 80 milliGRAM(s) IV Push two times a day  heparin   Injectable 4500 Unit(s) IV Push every 6 hours PRN  heparin   Injectable 2000 Unit(s) IV Push every 6 hours PRN  heparin  Infusion.  Unit(s)/Hr IV Continuous <Continuous>  influenza  Vaccine (HIGH DOSE) 0.5 milliLiter(s) IntraMuscular once  metoprolol tartrate Injectable 2.5 milliGRAM(s) IV Push every 6 hours  piperacillin/tazobactam IVPB.. 3.375 Gram(s) IV Intermittent every 12 hours      LABS:                        8.7    32.71 )-----------( 590      ( 02 Nov 2024 05:53 )             28.6       Hemoglobin: 8.7 g/dL (11-02 @ 05:53)  Hemoglobin: 9.0 g/dL (11-01 @ 03:40)  Hemoglobin: 9.0 g/dL (10-31 @ 20:32)  Hemoglobin: 8.8 g/dL (10-31 @ 05:20)  Hemoglobin: 8.6 g/dL (10-30 @ 05:43)      11-02    139  |  97[L]  |  90[H]  ----------------------------<  102[H]  4.0   |  27  |  3.25[H]    Ca    8.5      02 Nov 2024 05:53  Phos  3.7     11-02  Mg     1.90     11-02    TPro  5.5[L]  /  Alb  2.9[L]  /  TBili  1.4[H]  /  DBili  x   /  AST  29  /  ALT  109[H]  /  AlkPhos  126[H]  11-02    Creatinine Trend: 3.25<--, 3.58<--, 3.74<--, 3.69<--, 3.94<--, 4.39<--    COAGS: PT/INR - ( 02 Nov 2024 05:53 )   PT: 15.1 sec;   INR: 1.31 ratio         PTT - ( 02 Nov 2024 05:53 )  PTT:97.6 sec          PHYSICAL EXAM:  T(C): 36.5 (11-02-24 @ 10:30), Max: 36.8 (11-02-24 @ 05:55)  HR: 92 (11-02-24 @ 10:30) (92 - 111)  BP: 101/52 (11-02-24 @ 10:30) (101/52 - 118/78)  RR: 18 (11-02-24 @ 10:30) (15 - 18)  SpO2: 100% (11-02-24 @ 10:30) (98% - 100%)  Wt(kg): --    I&O's Summary    01 Nov 2024 07:01  -  02 Nov 2024 07:00  --------------------------------------------------------  IN: 860 mL / OUT: 800 mL / NET: 60 mL    02 Nov 2024 08:01  -  02 Nov 2024 12:06  --------------------------------------------------------  IN: 250 mL / OUT: 0 mL / NET: 250 mL            General: Well nourished in no acute distress. Alert and Oriented * 3.   Head: Normocephalic and atraumatic.   Neck: + JVD. No bruits. Supple. Does not appear to be enlarged.   Cardiovascular: + S1,S2 ; RRR Soft systolic murmur at the left lower sternal border. No rubs noted.    Lungs: CTA b/l. No rhonchi, rales or wheezes.   Abdomen: + BS, soft. Non tender. Non distended. No rebound. No guarding.   Extremities: No clubbing/cyanosis/1+ LE edema   Neurologic: Moves all four extremities. Full range of motion.   Skin: Warm and moist. The patient's skin has normal elasticity and good skin turgor.   Psychiatric: Appropriate mood and affect.  Musculoskeletal: Normal range of motion, normal strength    DATA    TELEMETRY: 	AF      ECG:  Afib with RVR	    < from: CT Chest No Cont (10.29.24 @ 09:53) >  IMPRESSION:  *  Bilateral pulmonary ground glass opacity consistent with edema versus   pneumonia.  *  Trace bilateral pleural effusions, ascites, and anasarca.  *  No evidence of acute infectious process in the abdomen and pelvis.  *  Bilateral renal cysts and right renal atrophy. No hydronephrosis.    < end of copied text >    < from: CT Head No Cont (10.29.24 @ 09:51) >  IMPRESSION:    No acute pathology.    < end of copied text >      < from: TTE W or WO Ultrasound Enhancing Agent (10.29.24 @ 13:17) >  CONCLUSIONS:      1. Left ventricular endocardium is not well visualized; however, the left ventricular systolic function appears grossly normal.   2. Enlarged right ventricular cavity size and reduced right ventricular systolic function.   3. There is calcification of the aortic valve leaflets. There is severe aortic stenosis. The peak transaortic velocity is 2.33 m/s, peak transaortic gradient is 21.8 mmHg and mean transaortic gradient is 13.3 mmHg with an LVOT/aortic valve VTI ratio of 0.39. The aortic valve area is estimated at 1.00 cm² by the continuity equation.   4. Structurally normal mitral valve with normal leaflet excursion. There is calcification of the mitral valve annulus. There is moderate to severe mitral regurgitation.   5. Structurally normal tricuspid valve with normal leaflet excursion. There is moderate to severe tricuspid regurgitation.   6. No pericardial effusion seen.   7. The inferior vena cava is dilated (dilated >2.1cm) with abnormal inspiratory collapse (abnormal <50%) consistent with elevated right atrial pressure (~15, range 10-20mmHg).   8. Technically difficult image quality.    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    #Rapid Afib  -- in the setting of multiple medical issues (volume overload, elevated WBC from baseline, EDA, transaminitis)  -- resume Lopressor 25 bid   -- Eliquis on hold given supratherapeutic INR, s/p Vit K, Heme following    #Volume Overload  -- pt with known valvular disease  -- TTE with severe AS, mod-sev MR, mod-sev TR  -- continue Lasix 80 IV BID  -- strict I/Os  -- clarify overall GOC and prognosis from heme perspective  -- given severe valvular disease, and if within GOC then will d/w structural team about TAVR, d/w Dr. Chow who stated had eval with CT surgery in past and they had decided on conservative management    #EDA  -- Renal Following, no plans for HD at this time    Jesenia Hills MD  Pager: [phone]

## 2024-11-02 NOTE — DISCHARGE NOTE PROVIDER - ATTENDING DISCHARGE PHYSICAL EXAMINATION:
Patient transferred to [facility] via EMS at 2150. IV intact and saline locked.   
VITAL SIGNS:  Vital Signs Last 24 Hrs  T(C): 36.8 (07 Nov 2024 12:07), Max: 36.8 (06 Nov 2024 20:26)  T(F): 98.2 (07 Nov 2024 12:07), Max: 98.3 (06 Nov 2024 20:26)  HR: 76 (07 Nov 2024 12:07) (76 - 103)  BP: 103/62 (07 Nov 2024 12:07) (99/54 - 117/61)  BP(mean): --  RR: 18 (07 Nov 2024 12:07) (18 - 18)  SpO2: 97% (07 Nov 2024 12:07) (97% - 99%)    Parameters below as of 07 Nov 2024 12:07  Patient On (Oxygen Delivery Method): room air        PHYSICAL EXAM:    General: NAD  HEENT: MMM  Neck: supple  Cardiovascular: +S1/S2; RRR  Respiratory: CTA B/L; no W/R/R  Gastrointestinal: soft, NT/ND  Extremities: WWP; no edema, clubbing or cyanosis  Neurological: awake and alert; communicating and able to follow commands without appreciated focal neurologic deficits

## 2024-11-02 NOTE — DISCHARGE NOTE PROVIDER - NSDCFUADDAPPT_GEN_ALL_CORE_FT
APPTS ARE READY TO BE MADE: [X] YES    Best Family or Patient Contact (if needed): Leno Guerin (son) [phone]    Additional Information about above appointments (if needed):    1: Dr. Waller within 2 weeks  2:   3:     Other comments or requests:    APPTS ARE READY TO BE MADE: [X] YES    Best Family or Patient Contact (if needed): Leno Guerin (son) [phone]    Additional Information about above appointments (if needed):    1: Dr. Waller within 2 weeks  2:   3:     Other comments or requests:     Patient is being discharged to [facility]. Caregiver will arrange follow up.

## 2024-11-02 NOTE — DISCHARGE NOTE PROVIDER - CARE PROVIDERS DIRECT ADDRESSES
[email or web address]

## 2024-11-02 NOTE — DISCHARGE NOTE PROVIDER - NSDCCPCAREPLAN_GEN_ALL_CORE_FT
PRINCIPAL DISCHARGE DIAGNOSIS  Diagnosis: AMS (altered mental status)  Assessment and Plan of Treatment: you had head ct which was unremarkable, follow up with pcp      SECONDARY DISCHARGE DIAGNOSES  Diagnosis: Coagulopathy  Assessment and Plan of Treatment: you were given vitamin k, take your medication as prescribed and follow up with hematologist as outpatient    Diagnosis: Afib  Assessment and Plan of Treatment: take medication and follow up    Diagnosis: CHF exacerbation  Assessment and Plan of Treatment: you were seen by cardiologist, given iv lasix, you had echocardiogram which showed aortic stenosis,  take medication as prescribed and follow up with cardiologist for further workup and management    Diagnosis: Acute kidney injury superimposed on CKD  Assessment and Plan of Treatment: you were seen by kidney doctor, follow up as outpatient     PRINCIPAL DISCHARGE DIAGNOSIS  Diagnosis: AMS (altered mental status)  Assessment and Plan of Treatment: You were confused when you came to the emergency room. This improved with treatment of your medical condition.      SECONDARY DISCHARGE DIAGNOSES  Diagnosis: CHF exacerbation  Assessment and Plan of Treatment: you were seen by cardiologist, given iv lasix to help you urinate off the excess fluid that had accumulated. you had echocardiogram which showed aortic stenosis. take medication as prescribed and follow up with cardiologist for further workup and management    Diagnosis: Acute kidney injury superimposed on CKD  Assessment and Plan of Treatment: Your kidney function started to improve with treating your excess fluid. Please follow up with a nephrologist to monitor your kidneys.    Diagnosis: Afib  Assessment and Plan of Treatment: Please continue to take your metoprolol and eliquis as prescribed.     PRINCIPAL DISCHARGE DIAGNOSIS  Diagnosis: AMS (altered mental status)  Assessment and Plan of Treatment: You were confused when you came to the emergency room. This improved with treatment of your medical condition.      SECONDARY DISCHARGE DIAGNOSES  Diagnosis: CHF exacerbation  Assessment and Plan of Treatment: you were seen by cardiologist, given iv lasix to help you urinate off the excess fluid that had accumulated. you had echocardiogram which showed aortic stenosis. take medication as prescribed and follow up with cardiologist for further workup and management    Diagnosis: Acute kidney injury superimposed on CKD  Assessment and Plan of Treatment: Your kidney function started to improve with treating your excess fluid. Please follow up with a nephrologist to monitor your kidneys.    Diagnosis: Afib  Assessment and Plan of Treatment: Please continue to take your metoprolol and eliquis as prescribed.    Diagnosis: Thrombocytosis  Assessment and Plan of Treatment: We switched your anagrelide to hydroxyurea. Please monitor your platelets twice a week while at rehab and follow up with your hematologist in 2 weeks after discharge.

## 2024-11-02 NOTE — PROGRESS NOTE ADULT - PROBLEM SELECTOR PLAN 9
Diet: s/p swallow rec regular diet  DVT ppx; on heparin drip  Dispo: inpt  Pall care consult appreciated, pt is full code   PT rec rehab, family will make a decision about rehab  Updated son Leno and daughter in law (PCP) on 11/1 and answered all questions      Plan discussed with ACP

## 2024-11-02 NOTE — DISCHARGE NOTE PROVIDER - CARE PROVIDER_API CALL
Aisha Waller  Medical Oncology  [address] [facility], [place] [address]  Phone: [phone]  Fax: [phone]  Follow Up Time: 2 weeks   Aisha Waller  Medical Oncology  [address] [place] [address], [place] [address]  Phone: [phone]  Fax: [phone]  Follow Up Time: 2 weeks    Dr. Chow,   Phone: (   )    -  Fax: (   )    -  Follow Up Time: 2 weeks

## 2024-11-02 NOTE — PROGRESS NOTE ADULT - SUBJECTIVE AND OBJECTIVE BOX
Patient is a 84y old  Female who presents with a chief complaint of shortness of breath and leg edema (02 Nov 2024 12:06)      SUBJECTIVE / OVERNIGHT EVENTS: Pt seen and examined at 12N, no overnight events, is alert, awake, answers questions, denies any complaints, No reports of any bleeding.         MEDICATIONS  (STANDING):  allopurinol 100 milliGRAM(s) Oral daily  chlorhexidine 2% Cloths 1 Application(s) Topical daily  furosemide   Injectable 80 milliGRAM(s) IV Push two times a day  heparin  Infusion.  Unit(s)/Hr (11 mL/Hr) IV Continuous <Continuous>  influenza  Vaccine (HIGH DOSE) 0.5 milliLiter(s) IntraMuscular once  metoprolol tartrate Injectable 2.5 milliGRAM(s) IV Push every 6 hours  piperacillin/tazobactam IVPB.. 3.375 Gram(s) IV Intermittent every 12 hours    MEDICATIONS  (PRN):  heparin   Injectable 2000 Unit(s) IV Push every 6 hours PRN For aPTT between 40 - 57  heparin   Injectable 4500 Unit(s) IV Push every 6 hours PRN For aPTT less than 40      Vital Signs Last 24 Hrs  T(C): 36.5 (02 Nov 2024 10:30), Max: 36.8 (02 Nov 2024 05:55)  T(F): 97.7 (02 Nov 2024 10:30), Max: 98.2 (02 Nov 2024 05:55)  HR: 69 (02 Nov 2024 13:50) (69 - 107)  BP: 96/57 (02 Nov 2024 13:50) (96/57 - 118/78)  BP(mean): --  RR: 18 (02 Nov 2024 10:30) (15 - 18)  SpO2: 100% (02 Nov 2024 10:30) (98% - 100%)    Parameters below as of 02 Nov 2024 10:30  Patient On (Oxygen Delivery Method): room air      CAPILLARY BLOOD GLUCOSE        I&O's Summary    01 Nov 2024 07:01  -  02 Nov 2024 07:00  --------------------------------------------------------  IN: 860 mL / OUT: 800 mL / NET: 60 mL    02 Nov 2024 08:01  -  02 Nov 2024 16:09  --------------------------------------------------------  IN: 564 mL / OUT: 500 mL / NET: 64 mL        PHYSICAL EXAM:  GENERAL: NAD  CHEST/LUNG: ctab  HEART: Irregularly irregular  ABDOMEN: Soft, Nontender, Nondistended  EXTREMITIES:  b/l LE edema improving  PSYCH: calm now  NEUROLOGY: AA Ox3    LABS:                        8.7    32.71 )-----------( 590      ( 02 Nov 2024 05:53 )             28.6     11-02    139  |  97[L]  |  90[H]  ----------------------------<  102[H]  4.0   |  27  |  3.25[H]    Ca    8.5      02 Nov 2024 05:53  Phos  3.7     11-02  Mg     1.90     11-02    TPro  5.5[L]  /  Alb  2.9[L]  /  TBili  1.4[H]  /  DBili  x   /  AST  29  /  ALT  109[H]  /  AlkPhos  126[H]  11-02    PT/INR - ( 02 Nov 2024 05:53 )   PT: 15.1 sec;   INR: 1.31 ratio         PTT - ( 02 Nov 2024 05:53 )  PTT:97.6 sec      Urinalysis Basic - ( 02 Nov 2024 05:53 )    Color: x / Appearance: x / SG: x / pH: x  Gluc: 102 mg/dL / Ketone: x  / Bili: x / Urobili: x   Blood: x / Protein: x / Nitrite: x   Leuk Esterase: x / RBC: x / WBC x   Sq Epi: x / Non Sq Epi: x / Bacteria: x        RADIOLOGY & ADDITIONAL TESTS:    Imaging Personally Reviewed:    Consultant(s) Notes Reviewed:      Care Discussed with Consultants/Other Providers:

## 2024-11-02 NOTE — PROGRESS NOTE ADULT - SUBJECTIVE AND OBJECTIVE BOX
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  Collateral obtained from son and daughter in law who is her PCP. She has been unwell for the past week, lives above them on the second floor and at baseline is independent and ambulatory, but has been in bed and with poor PO intake over the past week. Per son had significant leg edema preventing her from walking, also w/ poor PO intake. Appeared to have shortness of breath. Symptoms started last Monday.  In the ED was in afib w/ RVR. Labs significant for WBC 49, tbili 2.0, alk phos 219, AST 1198, , Cr. 4.3 (baseline mid 2s), metabolic acidosis with lactate elevation to 5, VBG 7.23/37/15/51, proBNP 35k, Trop 78 --> 69, Ca 8.2. Abdominal US with gallbladder wall thickening.   S/p zosyn, lasix 40 mg IVP x2, metoprolol 25mg, lopressor IV 5mg x3, sodium bicarb 50 IVP, diltiazem 30mg.    (29 Oct 2024 05:17)    Somnolent after given ativan.  Able to lie flat.  AFib was rapid on arrival, now rate-controlled after IV AVN blockers.  Not able to participate in HPI/ROS due to somnolence at this time.   11/1- no overnight issues.  Date of service 11/2 pt offers no complaints , no  events overnight    PAST MEDICAL & SURGICAL HISTORY:  Thrombocytosis  Hypertension  Hyperlipidemia  Afib  CHF (congestive heart failure)  No significant past surgical history          allopurinol 100 milliGRAM(s) Oral daily  chlorhexidine 2% Cloths 1 Application(s) Topical daily  furosemide   Injectable 80 milliGRAM(s) IV Push two times a day  heparin   Injectable 4500 Unit(s) IV Push every 6 hours PRN  heparin   Injectable 2000 Unit(s) IV Push every 6 hours PRN  heparin  Infusion.  Unit(s)/Hr IV Continuous <Continuous>  influenza  Vaccine (HIGH DOSE) 0.5 milliLiter(s) IntraMuscular once  metoprolol tartrate Injectable 2.5 milliGRAM(s) IV Push every 6 hours  piperacillin/tazobactam IVPB.. 3.375 Gram(s) IV Intermittent every 12 hours                            8.7    32.71 )-----------( 590      ( 02 Nov 2024 05:53 )             28.6       Hemoglobin: 8.7 g/dL (11-02 @ 05:53)  Hemoglobin: 9.0 g/dL (11-01 @ 03:40)  Hemoglobin: 9.0 g/dL (10-31 @ 20:32)  Hemoglobin: 8.8 g/dL (10-31 @ 05:20)  Hemoglobin: 8.6 g/dL (10-30 @ 05:43)      11-02    139  |  97[L]  |  90[H]  ----------------------------<  102[H]  4.0   |  27  |  3.25[H]    Ca    8.5      02 Nov 2024 05:53  Phos  3.7     11-02  Mg     1.90     11-02    TPro  5.5[L]  /  Alb  2.9[L]  /  TBili  1.4[H]  /  DBili  x   /  AST  29  /  ALT  109[H]  /  AlkPhos  126[H]  11-02    Creatinine Trend: 3.25<--, 3.58<--, 3.74<--, 3.69<--, 3.94<--, 4.39<--    COAGS: PT/INR - ( 02 Nov 2024 05:53 )   PT: 15.1 sec;   INR: 1.31 ratio         PTT - ( 02 Nov 2024 05:53 )  PTT:97.6 sec          T(C): 36.8 (11-02-24 @ 05:55), Max: 36.8 (11-02-24 @ 05:55)  HR: 98 (11-02-24 @ 05:55) (94 - 111)  BP: 118/78 (11-02-24 @ 05:55) (102/71 - 118/78)  RR: 15 (11-02-24 @ 05:55) (15 - 18)  SpO2: 99% (11-02-24 @ 05:55) (98% - 99%)  Wt(kg): --    I&O's Summary    01 Nov 2024 07:01  -  02 Nov 2024 07:00  --------------------------------------------------------  IN: 860 mL / OUT: 800 mL / NET: 60 mL    02 Nov 2024 08:01  -  02 Nov 2024 09:54  --------------------------------------------------------  IN: 250 mL / OUT: 0 mL / NET: 250 mL        Appearance: Normal size elderly woman, somnolent after sedation.	  HEENT:   Normal oral mucosa, PERRL, EOMI	  Lymphatic: No lymphadenopathy , + edema in BL feet  Cardiovascular: irregular S1 S2, No JVD, No murmurs , Peripheral pulses palpable 2+ bilaterally  Respiratory: Lungs clear to auscultation, normal effort 	  Gastrointestinal:  Soft, Non-tender, + BS	  Skin: No rashes, No ecchymoses, No cyanosis, warm to touch  Musculoskeletal: No spontaneous movement due to sedation.  Psychiatry:  Mood & affect unable to determine due to sedation.    TELEMETRY: AF RVR --> rate controlled	    ECG: AF / RVR  	    ASSESSMENT/PLAN: Ms Guerin is an 84y Female brought in from home with altered mental status.  When awake, can resume oral metoprolol 50mg PO Q6hrs, with holding parameters for HR<50 at rest.  Cont HEPARIN pending resolution of EDA, and potential for invasive procedures while hospitalized.  Dose of apixaban on discharge would be 2.5mg BID, as she is >78yo and weight is <60kg.  No invasive EP procedures planned.

## 2024-11-02 NOTE — DISCHARGE NOTE PROVIDER - PROVIDER TOKENS
PROVIDER:[TOKEN:[[id number]],FOLLOWUP:[2 weeks]] PROVIDER:[TOKEN:[[id number]],FOLLOWUP:[2 weeks]],FREE:[LAST:[Dr. Chow],PHONE:[(   )    -],FAX:[(   )    -],FOLLOWUP:[2 weeks]]

## 2024-11-02 NOTE — DISCHARGE NOTE PROVIDER - NSDCMRMEDTOKEN_GEN_ALL_CORE_FT
anagrelide:   Eliquis 5 mg oral tablet: 1 tab(s) orally 2 times a day  furosemide 20 mg oral tablet: 1 tab(s) orally 3 times a day  metoprolol succinate 50 mg oral capsule, extended release: 1 cap(s) orally once a day  sacubitril-valsartan:    allopurinol 100 mg oral tablet: 1 tab(s) orally once a day  apixaban 2.5 mg oral tablet: 1 tab(s) orally 2 times a day  furosemide 80 mg oral tablet: 1 tab(s) orally once a day  hydroxyurea 500 mg oral capsule: 1 cap(s) orally every other day  metoprolol tartrate 50 mg oral tablet: 1 tab(s) orally 2 times a day

## 2024-11-03 LAB
ALBUMIN SERPL ELPH-MCNC: 3 G/DL — LOW (ref 3.3–5)
ALP SERPL-CCNC: 128 U/L — HIGH (ref 40–120)
ALT FLD-CCNC: 85 U/L — HIGH (ref 4–33)
ANION GAP SERPL CALC-SCNC: 16 MMOL/L — HIGH (ref 7–14)
APTT BLD: 87.5 SEC — HIGH (ref 24.5–35.6)
AST SERPL-CCNC: 31 U/L — SIGNIFICANT CHANGE UP (ref 4–32)
BASOPHILS # BLD AUTO: 0.48 K/UL — HIGH (ref 0–0.2)
BASOPHILS NFR BLD AUTO: 1.5 % — SIGNIFICANT CHANGE UP (ref 0–2)
BILIRUB SERPL-MCNC: 1.4 MG/DL — HIGH (ref 0.2–1.2)
BUN SERPL-MCNC: 79 MG/DL — HIGH (ref 7–23)
CALCIUM SERPL-MCNC: 8.8 MG/DL — SIGNIFICANT CHANGE UP (ref 8.4–10.5)
CHLORIDE SERPL-SCNC: 92 MMOL/L — LOW (ref 98–107)
CO2 SERPL-SCNC: 27 MMOL/L — SIGNIFICANT CHANGE UP (ref 22–31)
CREAT SERPL-MCNC: 3.12 MG/DL — HIGH (ref 0.5–1.3)
CULTURE RESULTS: SIGNIFICANT CHANGE UP
CULTURE RESULTS: SIGNIFICANT CHANGE UP
EGFR: 14 ML/MIN/1.73M2 — LOW
EOSINOPHIL # BLD AUTO: 0.21 K/UL — SIGNIFICANT CHANGE UP (ref 0–0.5)
EOSINOPHIL NFR BLD AUTO: 0.7 % — SIGNIFICANT CHANGE UP (ref 0–6)
GLUCOSE SERPL-MCNC: 88 MG/DL — SIGNIFICANT CHANGE UP (ref 70–99)
HCT VFR BLD CALC: 31.5 % — LOW (ref 34.5–45)
HGB BLD-MCNC: 9.7 G/DL — LOW (ref 11.5–15.5)
IANC: 23.55 K/UL — HIGH (ref 1.8–7.4)
IMM GRANULOCYTES NFR BLD AUTO: 7.7 % — HIGH (ref 0–0.9)
INR BLD: 1.14 RATIO — SIGNIFICANT CHANGE UP (ref 0.85–1.16)
LYMPHOCYTES # BLD AUTO: 11 % — LOW (ref 13–44)
LYMPHOCYTES # BLD AUTO: 3.5 K/UL — HIGH (ref 1–3.3)
MAGNESIUM SERPL-MCNC: 2 MG/DL — SIGNIFICANT CHANGE UP (ref 1.6–2.6)
MCHC RBC-ENTMCNC: 24.1 PG — LOW (ref 27–34)
MCHC RBC-ENTMCNC: 30.8 G/DL — LOW (ref 32–36)
MCV RBC AUTO: 78.4 FL — LOW (ref 80–100)
MONOCYTES # BLD AUTO: 1.55 K/UL — HIGH (ref 0–0.9)
MONOCYTES NFR BLD AUTO: 4.9 % — SIGNIFICANT CHANGE UP (ref 2–14)
NEUTROPHILS # BLD AUTO: 23.55 K/UL — HIGH (ref 1.8–7.4)
NEUTROPHILS NFR BLD AUTO: 74.2 % — SIGNIFICANT CHANGE UP (ref 43–77)
NRBC # BLD: 0 /100 WBCS — SIGNIFICANT CHANGE UP (ref 0–0)
NRBC # FLD: 0.08 K/UL — HIGH (ref 0–0)
PHOSPHATE SERPL-MCNC: 3.8 MG/DL — SIGNIFICANT CHANGE UP (ref 2.5–4.5)
PLATELET # BLD AUTO: 713 K/UL — HIGH (ref 150–400)
POTASSIUM SERPL-MCNC: 3.9 MMOL/L — SIGNIFICANT CHANGE UP (ref 3.5–5.3)
POTASSIUM SERPL-SCNC: 3.9 MMOL/L — SIGNIFICANT CHANGE UP (ref 3.5–5.3)
PROT SERPL-MCNC: 5.8 G/DL — LOW (ref 6–8.3)
PROTHROM AB SERPL-ACNC: 13.6 SEC — HIGH (ref 9.9–13.4)
RBC # BLD: 4.02 M/UL — SIGNIFICANT CHANGE UP (ref 3.8–5.2)
RBC # FLD: 26.3 % — HIGH (ref 10.3–14.5)
SODIUM SERPL-SCNC: 135 MMOL/L — SIGNIFICANT CHANGE UP (ref 135–145)
SPECIMEN SOURCE: SIGNIFICANT CHANGE UP
SPECIMEN SOURCE: SIGNIFICANT CHANGE UP
WBC # BLD: 31.73 K/UL — HIGH (ref 3.8–10.5)
WBC # FLD AUTO: 31.73 K/UL — HIGH (ref 3.8–10.5)

## 2024-11-03 PROCEDURE — 99232 SBSQ HOSP IP/OBS MODERATE 35: CPT | Mod: GC

## 2024-11-03 PROCEDURE — 99232 SBSQ HOSP IP/OBS MODERATE 35: CPT

## 2024-11-03 RX ORDER — FUROSEMIDE 40 MG
80 TABLET ORAL EVERY 12 HOURS
Refills: 0 | Status: DISCONTINUED | OUTPATIENT
Start: 2024-11-04 | End: 2024-11-05

## 2024-11-03 RX ORDER — ANAGRELIDE HYDROCHLORIDE 0.5 MG/1
0.5 CAPSULE ORAL
Refills: 0 | Status: DISCONTINUED | OUTPATIENT
Start: 2024-11-03 | End: 2024-11-04

## 2024-11-03 RX ORDER — METOPROLOL TARTRATE 50 MG
12.5 TABLET ORAL
Refills: 0 | Status: DISCONTINUED | OUTPATIENT
Start: 2024-11-03 | End: 2024-11-04

## 2024-11-03 RX ADMIN — Medication 80 MILLIGRAM(S): at 07:36

## 2024-11-03 RX ADMIN — HEPARIN SODIUM 800 UNIT(S)/HR: 10000 INJECTION INTRAVENOUS; SUBCUTANEOUS at 08:36

## 2024-11-03 RX ADMIN — Medication 2.5 MILLIGRAM(S): at 07:35

## 2024-11-03 RX ADMIN — Medication 80 MILLIGRAM(S): at 12:25

## 2024-11-03 RX ADMIN — CHLORHEXIDINE GLUCONATE 1 APPLICATION(S): 40 SOLUTION TOPICAL at 12:24

## 2024-11-03 RX ADMIN — Medication 100 MILLIGRAM(S): at 12:24

## 2024-11-03 RX ADMIN — Medication 12.5 MILLIGRAM(S): at 18:11

## 2024-11-03 RX ADMIN — HEPARIN SODIUM 800 UNIT(S)/HR: 10000 INJECTION INTRAVENOUS; SUBCUTANEOUS at 07:31

## 2024-11-03 RX ADMIN — PIPERACILLIN AND TAZOBACTAM 25 GRAM(S): .5; 4 INJECTION, POWDER, LYOPHILIZED, FOR SOLUTION INTRAVENOUS at 07:36

## 2024-11-03 RX ADMIN — HEPARIN SODIUM 800 UNIT(S)/HR: 10000 INJECTION INTRAVENOUS; SUBCUTANEOUS at 19:16

## 2024-11-03 NOTE — PROGRESS NOTE ADULT - SUBJECTIVE AND OBJECTIVE BOX
DATE OF SERVICE: 11-03-24    Patient denies chest pain or shortness of breath.   Review of symptoms otherwise negative.    MEDICATIONS:  allopurinol 100 milliGRAM(s) Oral daily  anagrelide 0.5 milliGRAM(s) Oral <User Schedule>  chlorhexidine 2% Cloths 1 Application(s) Topical daily  furosemide   Injectable 80 milliGRAM(s) IV Push two times a day  heparin   Injectable 2000 Unit(s) IV Push every 6 hours PRN  heparin   Injectable 4500 Unit(s) IV Push every 6 hours PRN  heparin  Infusion.  Unit(s)/Hr IV Continuous <Continuous>  influenza  Vaccine (HIGH DOSE) 0.5 milliLiter(s) IntraMuscular once  metoprolol tartrate Injectable 2.5 milliGRAM(s) IV Push every 6 hours  piperacillin/tazobactam IVPB.. 3.375 Gram(s) IV Intermittent every 12 hours      LABS:                        9.7    31.73 )-----------( 713      ( 03 Nov 2024 07:10 )             31.5       Hemoglobin: 9.7 g/dL (11-03 @ 07:10)  Hemoglobin: 8.7 g/dL (11-02 @ 05:53)  Hemoglobin: 9.0 g/dL (11-01 @ 03:40)  Hemoglobin: 9.0 g/dL (10-31 @ 20:32)  Hemoglobin: 8.8 g/dL (10-31 @ 05:20)      11-03    135  |  92[L]  |  79[H]  ----------------------------<  88  3.9   |  27  |  3.12[H]    Ca    8.8      03 Nov 2024 07:10  Phos  3.8     11-03  Mg     2.00     11-03    TPro  5.8[L]  /  Alb  3.0[L]  /  TBili  1.4[H]  /  DBili  x   /  AST  31  /  ALT  85[H]  /  AlkPhos  128[H]  11-03    Creatinine Trend: 3.12<--, 3.25<--, 3.58<--, 3.74<--, 3.69<--, 3.94<--    COAGS: PT/INR - ( 03 Nov 2024 07:10 )   PT: 13.6 sec;   INR: 1.14 ratio         PTT - ( 03 Nov 2024 07:10 )  PTT:87.5 sec          PHYSICAL EXAM:  T(C): 36.7 (11-03-24 @ 07:35), Max: 36.7 (11-03-24 @ 07:35)  HR: 99 (11-03-24 @ 07:35) (69 - 105)  BP: 118/62 (11-03-24 @ 07:35) (96/57 - 118/62)  RR: 17 (11-03-24 @ 07:35) (17 - 18)  SpO2: 99% (11-03-24 @ 07:35) (99% - 100%)  Wt(kg): --    I&O's Summary    02 Nov 2024 08:01  -  03 Nov 2024 07:00  --------------------------------------------------------  IN: 846 mL / OUT: 800 mL / NET: 46 mL      General: Well nourished in no acute distress. Alert and Oriented * 3.   Head: Normocephalic and atraumatic.   Neck: + JVD. No bruits. Supple. Does not appear to be enlarged.   Cardiovascular: + S1,S2 ; RRR Soft systolic murmur at the left lower sternal border. No rubs noted.    Lungs: CTA b/l. No rhonchi, rales or wheezes.   Abdomen: + BS, soft. Non tender. Non distended. No rebound. No guarding.   Extremities: No clubbing/cyanosis/1+ LE edema   Neurologic: Moves all four extremities. Full range of motion.   Skin: Warm and moist. The patient's skin has normal elasticity and good skin turgor.   Psychiatric: Appropriate mood and affect.  Musculoskeletal: Normal range of motion, normal strength    DATA    TELEMETRY: 	AF      ECG:  Afib with RVR	    < from: CT Chest No Cont (10.29.24 @ 09:53) >  IMPRESSION:  *  Bilateral pulmonary ground glass opacity consistent with edema versus   pneumonia.  *  Trace bilateral pleural effusions, ascites, and anasarca.  *  No evidence of acute infectious process in the abdomen and pelvis.  *  Bilateral renal cysts and right renal atrophy. No hydronephrosis.    < end of copied text >    < from: CT Head No Cont (10.29.24 @ 09:51) >  IMPRESSION:    No acute pathology.    < end of copied text >      < from: TTE W or WO Ultrasound Enhancing Agent (10.29.24 @ 13:17) >  CONCLUSIONS:      1. Left ventricular endocardium is not well visualized; however, the left ventricular systolic function appears grossly normal.   2. Enlarged right ventricular cavity size and reduced right ventricular systolic function.   3. There is calcification of the aortic valve leaflets. There is severe aortic stenosis. The peak transaortic velocity is 2.33 m/s, peak transaortic gradient is 21.8 mmHg and mean transaortic gradient is 13.3 mmHg with an LVOT/aortic valve VTI ratio of 0.39. The aortic valve area is estimated at 1.00 cm² by the continuity equation.   4. Structurally normal mitral valve with normal leaflet excursion. There is calcification of the mitral valve annulus. There is moderate to severe mitral regurgitation.   5. Structurally normal tricuspid valve with normal leaflet excursion. There is moderate to severe tricuspid regurgitation.   6. No pericardial effusion seen.   7. The inferior vena cava is dilated (dilated >2.1cm) with abnormal inspiratory collapse (abnormal <50%) consistent with elevated right atrial pressure (~15, range 10-20mmHg).   8. Technically difficult image quality.    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    #Rapid Afib  -- in the setting of multiple medical issues (volume overload, elevated WBC from baseline, EDA, transaminitis)  -- resume Lopressor 25 bid   -- Eliquis on hold given supratherapeutic INR, s/p Vit K, Heme following    #Volume Overload  -- pt with known valvular disease  -- TTE with severe AS, mod-sev MR, mod-sev TR  -- Plan to transition to oral lasix tomorrow  -- strict I/Os  -- clarify overall GOC and prognosis from heme perspective  -- given severe valvular disease, and if within GOC then will d/w structural team about TAVR, d/w Dr. Chow who stated had eval with CT surgery in past and they had decided on conservative management    #EDA  -- Renal Following, no plans for HD at this time    Jesenia Hills MD  Pager: [phone]

## 2024-11-03 NOTE — PROGRESS NOTE ADULT - SUBJECTIVE AND OBJECTIVE BOX
[facility] Division of Kidney Diseases & Hypertension  FOLLOW UP NOTE  [phone]--------------------------------------------------------------------------------  Chief Complaint: EDA on CKD, metabolic acidosis    24 hour events/subjective: Pt. was seen and evaluated at bedside earlier today. Pt. more awake, reports improvement in dyspnea. No new complaints. Denies any headaches, fevers/chills/ chest pain/abdominal pain/ HA/ Nausea/ vomiting.       PAST HISTORY  --------------------------------------------------------------------------------  No significant changes to PMH, PSH, FHx, SHx, unless otherwise noted    ALLERGIES & MEDICATIONS  --------------------------------------------------------------------------------  Allergies    No Known Drug Allergies  eggs (Unknown)    Intolerances      Standing Inpatient Medications  allopurinol 100 milliGRAM(s) Oral daily  anagrelide 0.5 milliGRAM(s) Oral <User Schedule>  chlorhexidine 2% Cloths 1 Application(s) Topical daily  furosemide   Injectable 80 milliGRAM(s) IV Push two times a day  heparin  Infusion.  Unit(s)/Hr IV Continuous <Continuous>  influenza  Vaccine (HIGH DOSE) 0.5 milliLiter(s) IntraMuscular once  metoprolol tartrate Injectable 2.5 milliGRAM(s) IV Push every 6 hours  piperacillin/tazobactam IVPB.. 3.375 Gram(s) IV Intermittent every 12 hours    PRN Inpatient Medications  heparin   Injectable 2000 Unit(s) IV Push every 6 hours PRN  heparin   Injectable 4500 Unit(s) IV Push every 6 hours PRN      REVIEW OF SYSTEMS  --------------------------------------------------------------------------------  Gen: No fevers/chills  Skin: No rashes  Head/Eyes/Ears/Mouth: No headache  Respiratory: no dyspnea  CV: No chest pain  GI: No abdominal pain  MSK: +BL LE edema trace  Neuro: No dizziness/lightheadedness    All other systems were reviewed and are negative, except as noted.    VITALS/PHYSICAL EXAM  --------------------------------------------------------------------------------  T(C): 36.8 (11-03-24 @ 12:21), Max: 36.8 (11-03-24 @ 12:21)  HR: 106 (11-03-24 @ 12:21) (98 - 106)  BP: 100/68 (11-03-24 @ 12:21) (100/68 - 118/62)  RR: 18 (11-03-24 @ 12:21) (17 - 18)  SpO2: 100% (11-03-24 @ 12:21) (99% - 100%)  Wt(kg): --    11-02-24 @ 08:01  -  11-03-24 @ 07:00  --------------------------------------------------------  IN: 846 mL / OUT: 800 mL / NET: 46 mL    11-03-24 @ 07:01  -  11-03-24 @ 14:02  --------------------------------------------------------  IN: 0 mL / OUT: 600 mL / NET: -600 mL      Physical Exam:  Gen: NAD  HEENT: Anicteric  Pulm: B/L lower zone decreased breath sounds  CV: S1S2+  Abd: Soft, +BS    Ext: +Mild LE edema B/L  Neuro: Awake  Skin: Warm and dry    LABS/STUDIES  --------------------------------------------------------------------------------              9.7    31.73 >-----------<  713      [11-03-24 @ 07:10]              31.5     135  |  92  |  79  ----------------------------<  88      [11-03-24 @ 07:10]  3.9   |  27  |  3.12        Ca     8.8     [11-03-24 @ 07:10]      Mg     2.00     [11-03-24 @ 07:10]      Phos  3.8     [11-03-24 @ 07:10]    TPro  5.8  /  Alb  3.0  /  TBili  1.4  /  DBili  x   /  AST  31  /  ALT  85  /  AlkPhos  128  [11-03-24 @ 07:10]    PT/INR: PT 13.6 , INR 1.14       [11-03-24 @ 07:10]  PTT: 87.5       [11-03-24 @ 07:10]      Creatinine Trend:  SCr 3.12 [11-03 @ 07:10]  SCr 3.25 [11-02 @ 05:53]  SCr 3.58 [11-01 @ 03:40]  SCr 3.74 [10-31 @ 05:20]  SCr 3.69 [10-30 @ 15:02]    Urine Creatinine 49      [10-29-24 @ 08:28]  Urine Protein 13      [10-29-24 @ 08:28]  Urine Sodium 68      [10-29-24 @ 08:28]  Urine Urea Nitrogen 387.0      [10-29-24 @ 08:28]  Urine Potassium 16.0      [10-29-24 @ 08:28]  Urine Osmolality 347      [10-29-24 @ 08:28]    TSH 3.38      [10-29-24 @ 06:49]    HCV 0.29, Nonreact      [10-29-24 @ 06:49]

## 2024-11-03 NOTE — PROGRESS NOTE ADULT - SUBJECTIVE AND OBJECTIVE BOX
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  Collateral obtained from son and daughter in law who is her PCP. She has been unwell for the past week, lives above them on the second floor and at baseline is independent and ambulatory, but has been in bed and with poor PO intake over the past week. Per son had significant leg edema preventing her from walking, also w/ poor PO intake. Appeared to have shortness of breath. Symptoms started last Monday.  In the ED was in afib w/ RVR. Labs significant for WBC 49, tbili 2.0, alk phos 219, AST 1198, , Cr. 4.3 (baseline mid 2s), metabolic acidosis with lactate elevation to 5, VBG 7.23/37/15/51, proBNP 35k, Trop 78 --> 69, Ca 8.2. Abdominal US with gallbladder wall thickening.   S/p zosyn, lasix 40 mg IVP x2, metoprolol 25mg, lopressor IV 5mg x3, sodium bicarb 50 IVP, diltiazem 30mg.    (29 Oct 2024 05:17)    Somnolent after given ativan.  Able to lie flat.  AFib was rapid on arrival, now rate-controlled after IV AVN blockers.  Not able to participate in HPI/ROS due to somnolence at this time.   11/1- no overnight issues.   11/2 pt offers no complaints , no  events overnight  Date of service 11/3 pt is resting in bed, ROS -         PAST MEDICAL & SURGICAL HISTORY:  Thrombocytosis  Hypertension  Hyperlipidemia  Afib  CHF (congestive heart failure)  No significant past surgical history           allopurinol 100 milliGRAM(s) Oral daily  chlorhexidine 2% Cloths 1 Application(s) Topical daily  furosemide   Injectable 80 milliGRAM(s) IV Push two times a day  heparin   Injectable 2000 Unit(s) IV Push every 6 hours PRN  heparin   Injectable 4500 Unit(s) IV Push every 6 hours PRN  heparin  Infusion.  Unit(s)/Hr IV Continuous <Continuous>  influenza  Vaccine (HIGH DOSE) 0.5 milliLiter(s) IntraMuscular once  metoprolol tartrate Injectable 2.5 milliGRAM(s) IV Push every 6 hours  piperacillin/tazobactam IVPB.. 3.375 Gram(s) IV Intermittent every 12 hours                            9.7    31.73 )-----------( 713      ( 03 Nov 2024 07:10 )             31.5       Hemoglobin: 9.7 g/dL (11-03 @ 07:10)  Hemoglobin: 8.7 g/dL (11-02 @ 05:53)  Hemoglobin: 9.0 g/dL (11-01 @ 03:40)  Hemoglobin: 9.0 g/dL (10-31 @ 20:32)  Hemoglobin: 8.8 g/dL (10-31 @ 05:20)      11-03    135  |  92[L]  |  79[H]  ----------------------------<  88  3.9   |  27  |  3.12[H]    Ca    8.8      03 Nov 2024 07:10  Phos  3.8     11-03  Mg     2.00     11-03    TPro  5.8[L]  /  Alb  3.0[L]  /  TBili  1.4[H]  /  DBili  x   /  AST  31  /  ALT  85[H]  /  AlkPhos  128[H]  11-03    Creatinine Trend: 3.12<--, 3.25<--, 3.58<--, 3.74<--, 3.69<--, 3.94<--    COAGS: PT/INR - ( 03 Nov 2024 07:10 )   PT: 13.6 sec;   INR: 1.14 ratio         PTT - ( 03 Nov 2024 07:10 )  PTT:87.5 sec          T(C): 36.7 (11-03-24 @ 07:35), Max: 36.7 (11-03-24 @ 07:35)  HR: 99 (11-03-24 @ 07:35) (69 - 105)  BP: 118/62 (11-03-24 @ 07:35) (96/57 - 118/62)  RR: 17 (11-03-24 @ 07:35) (17 - 18)  SpO2: 99% (11-03-24 @ 07:35) (99% - 100%)  Wt(kg): --    I&O's Summary    02 Nov 2024 08:01  -  03 Nov 2024 07:00  --------------------------------------------------------  IN: 846 mL / OUT: 800 mL / NET: 46 mL            Appearance: Normal size elderly woman, somnolent after sedation.	  HEENT:   Normal oral mucosa, PERRL, EOMI	  Lymphatic: No lymphadenopathy , + edema in BL feet  Cardiovascular: irregular S1 S2, No JVD, No murmurs , Peripheral pulses palpable 2+ bilaterally  Respiratory: Lungs clear to auscultation, normal effort 	  Gastrointestinal:  Soft, Non-tender, + BS	  Skin: No rashes, No ecchymoses, No cyanosis, warm to touch  Musculoskeletal: No spontaneous movement due to sedation.  Psychiatry:  Mood & affect unable to determine due to sedation.    TELEMETRY: AF RVR --> rate controlled	    ECG: AF / RVR  	    ASSESSMENT/PLAN: Ms Guerin is an 84y Female brought in from home with altered mental status.  When awake, can resume oral metoprolol 50mg PO Q6hrs, with holding parameters for HR<50 at rest.  Cont HEPARIN pending resolution of EDA, and potential for invasive procedures while hospitalized.  Dose of apixaban on discharge would be 2.5mg BID, as she is >80yo and weight is <60kg.  No invasive EP procedures planned.

## 2024-11-03 NOTE — PROGRESS NOTE ADULT - SUBJECTIVE AND OBJECTIVE BOX
Patient is a 84y old  Female who presents with a chief complaint of shortness of breath and leg edema (03 Nov 2024 14:02)      SUBJECTIVE / OVERNIGHT EVENTS: Pt seen and examined at 12:10pm, no overnight events, is alert, awake, answers questions, edema much improved, no sob, chest pain, bleeding or any other complaints, No other new issues reported.        MEDICATIONS  (STANDING):  allopurinol 100 milliGRAM(s) Oral daily  anagrelide 0.5 milliGRAM(s) Oral <User Schedule>  chlorhexidine 2% Cloths 1 Application(s) Topical daily  furosemide   Injectable 80 milliGRAM(s) IV Push two times a day  heparin  Infusion.  Unit(s)/Hr (11 mL/Hr) IV Continuous <Continuous>  influenza  Vaccine (HIGH DOSE) 0.5 milliLiter(s) IntraMuscular once  metoprolol tartrate Injectable 2.5 milliGRAM(s) IV Push every 6 hours  piperacillin/tazobactam IVPB.. 3.375 Gram(s) IV Intermittent every 12 hours    MEDICATIONS  (PRN):  heparin   Injectable 2000 Unit(s) IV Push every 6 hours PRN For aPTT between 40 - 57  heparin   Injectable 4500 Unit(s) IV Push every 6 hours PRN For aPTT less than 40      Vital Signs Last 24 Hrs  T(C): 36.8 (03 Nov 2024 12:21), Max: 36.8 (03 Nov 2024 12:21)  T(F): 98.2 (03 Nov 2024 12:21), Max: 98.2 (03 Nov 2024 12:21)  HR: 106 (03 Nov 2024 12:21) (98 - 106)  BP: 100/68 (03 Nov 2024 12:21) (100/68 - 118/62)  BP(mean): --  RR: 18 (03 Nov 2024 12:21) (17 - 18)  SpO2: 100% (03 Nov 2024 12:21) (99% - 100%)    Parameters below as of 03 Nov 2024 12:21  Patient On (Oxygen Delivery Method): room air      CAPILLARY BLOOD GLUCOSE        I&O's Summary    02 Nov 2024 08:01  -  03 Nov 2024 07:00  --------------------------------------------------------  IN: 846 mL / OUT: 800 mL / NET: 46 mL    03 Nov 2024 07:01  -  03 Nov 2024 14:41  --------------------------------------------------------  IN: 0 mL / OUT: 600 mL / NET: -600 mL        PHYSICAL EXAM:  GENERAL: NAD  CHEST/LUNG: ctab  HEART: Irregularly irregular  ABDOMEN: Soft, Nontender, Nondistended  EXTREMITIES:  b/l LE edema improving  PSYCH: calm   NEUROLOGY: AA Ox3    LABS:                        9.7    31.73 )-----------( 713      ( 03 Nov 2024 07:10 )             31.5     11-03    135  |  92[L]  |  79[H]  ----------------------------<  88  3.9   |  27  |  3.12[H]    Ca    8.8      03 Nov 2024 07:10  Phos  3.8     11-03  Mg     2.00     11-03    TPro  5.8[L]  /  Alb  3.0[L]  /  TBili  1.4[H]  /  DBili  x   /  AST  31  /  ALT  85[H]  /  AlkPhos  128[H]  11-03    PT/INR - ( 03 Nov 2024 07:10 )   PT: 13.6 sec;   INR: 1.14 ratio         PTT - ( 03 Nov 2024 07:10 )  PTT:87.5 sec      Urinalysis Basic - ( 03 Nov 2024 07:10 )    Color: x / Appearance: x / SG: x / pH: x  Gluc: 88 mg/dL / Ketone: x  / Bili: x / Urobili: x   Blood: x / Protein: x / Nitrite: x   Leuk Esterase: x / RBC: x / WBC x   Sq Epi: x / Non Sq Epi: x / Bacteria: x        RADIOLOGY & ADDITIONAL TESTS:    Imaging Personally Reviewed:    Consultant(s) Notes Reviewed:      Care Discussed with Consultants/Other Providers:

## 2024-11-04 LAB
ALBUMIN SERPL ELPH-MCNC: 3 G/DL — LOW (ref 3.3–5)
ALP SERPL-CCNC: 135 U/L — HIGH (ref 40–120)
ALT FLD-CCNC: 70 U/L — HIGH (ref 4–33)
ANION GAP SERPL CALC-SCNC: 16 MMOL/L — HIGH (ref 7–14)
APTT BLD: 76.2 SEC — HIGH (ref 24.5–35.6)
AST SERPL-CCNC: 39 U/L — HIGH (ref 4–32)
BASOPHILS # BLD AUTO: 0.39 K/UL — HIGH (ref 0–0.2)
BASOPHILS NFR BLD AUTO: 1.4 % — SIGNIFICANT CHANGE UP (ref 0–2)
BILIRUB SERPL-MCNC: 1.3 MG/DL — HIGH (ref 0.2–1.2)
BUN SERPL-MCNC: 74 MG/DL — HIGH (ref 7–23)
CALCIUM SERPL-MCNC: 8.8 MG/DL — SIGNIFICANT CHANGE UP (ref 8.4–10.5)
CHLORIDE SERPL-SCNC: 92 MMOL/L — LOW (ref 98–107)
CO2 SERPL-SCNC: 25 MMOL/L — SIGNIFICANT CHANGE UP (ref 22–31)
CREAT SERPL-MCNC: 3.34 MG/DL — HIGH (ref 0.5–1.3)
EGFR: 13 ML/MIN/1.73M2 — LOW
EOSINOPHIL # BLD AUTO: 0.24 K/UL — SIGNIFICANT CHANGE UP (ref 0–0.5)
EOSINOPHIL NFR BLD AUTO: 0.9 % — SIGNIFICANT CHANGE UP (ref 0–6)
GLUCOSE SERPL-MCNC: 73 MG/DL — SIGNIFICANT CHANGE UP (ref 70–99)
HCT VFR BLD CALC: 32.3 % — LOW (ref 34.5–45)
HGB BLD-MCNC: 9.6 G/DL — LOW (ref 11.5–15.5)
IANC: 21.62 K/UL — HIGH (ref 1.8–7.4)
IMM GRANULOCYTES NFR BLD AUTO: 6.9 % — HIGH (ref 0–0.9)
INR BLD: 1.17 RATIO — HIGH (ref 0.85–1.16)
LYMPHOCYTES # BLD AUTO: 2.62 K/UL — SIGNIFICANT CHANGE UP (ref 1–3.3)
LYMPHOCYTES # BLD AUTO: 9.3 % — LOW (ref 13–44)
MAGNESIUM SERPL-MCNC: 2.1 MG/DL — SIGNIFICANT CHANGE UP (ref 1.6–2.6)
MCHC RBC-ENTMCNC: 24.1 PG — LOW (ref 27–34)
MCHC RBC-ENTMCNC: 29.7 G/DL — LOW (ref 32–36)
MCV RBC AUTO: 81 FL — SIGNIFICANT CHANGE UP (ref 80–100)
MONOCYTES # BLD AUTO: 1.41 K/UL — HIGH (ref 0–0.9)
MONOCYTES NFR BLD AUTO: 5 % — SIGNIFICANT CHANGE UP (ref 2–14)
NEUTROPHILS # BLD AUTO: 21.62 K/UL — HIGH (ref 1.8–7.4)
NEUTROPHILS NFR BLD AUTO: 76.5 % — SIGNIFICANT CHANGE UP (ref 43–77)
NRBC # BLD: 0 /100 WBCS — SIGNIFICANT CHANGE UP (ref 0–0)
NRBC # FLD: 0.04 K/UL — HIGH (ref 0–0)
PHOSPHATE SERPL-MCNC: 4.2 MG/DL — SIGNIFICANT CHANGE UP (ref 2.5–4.5)
PLATELET # BLD AUTO: 714 K/UL — HIGH (ref 150–400)
POTASSIUM SERPL-MCNC: 4.2 MMOL/L — SIGNIFICANT CHANGE UP (ref 3.5–5.3)
POTASSIUM SERPL-SCNC: 4.2 MMOL/L — SIGNIFICANT CHANGE UP (ref 3.5–5.3)
PROT SERPL-MCNC: 6 G/DL — SIGNIFICANT CHANGE UP (ref 6–8.3)
PROTHROM AB SERPL-ACNC: 13.6 SEC — HIGH (ref 9.9–13.4)
RBC # BLD: 3.99 M/UL — SIGNIFICANT CHANGE UP (ref 3.8–5.2)
RBC # FLD: 26.3 % — HIGH (ref 10.3–14.5)
SODIUM SERPL-SCNC: 133 MMOL/L — LOW (ref 135–145)
WBC # BLD: 28.22 K/UL — HIGH (ref 3.8–10.5)
WBC # FLD AUTO: 28.22 K/UL — HIGH (ref 3.8–10.5)

## 2024-11-04 PROCEDURE — 99232 SBSQ HOSP IP/OBS MODERATE 35: CPT

## 2024-11-04 PROCEDURE — 99233 SBSQ HOSP IP/OBS HIGH 50: CPT | Mod: GC

## 2024-11-04 PROCEDURE — 99233 SBSQ HOSP IP/OBS HIGH 50: CPT

## 2024-11-04 RX ORDER — METOPROLOL TARTRATE 50 MG
25 TABLET ORAL
Refills: 0 | Status: DISCONTINUED | OUTPATIENT
Start: 2024-11-04 | End: 2024-11-06

## 2024-11-04 RX ORDER — HYDROXYUREA 500 MG
500 CAPSULE ORAL DAILY
Refills: 0 | Status: DISCONTINUED | OUTPATIENT
Start: 2024-11-05 | End: 2024-11-07

## 2024-11-04 RX ORDER — METOPROLOL TARTRATE 50 MG
12.5 TABLET ORAL ONCE
Refills: 0 | Status: COMPLETED | OUTPATIENT
Start: 2024-11-04 | End: 2024-11-04

## 2024-11-04 RX ORDER — RASBURICASE 7.5 MG
6 KIT INTRAVENOUS ONCE
Refills: 0 | Status: DISCONTINUED | OUTPATIENT
Start: 2024-11-04 | End: 2024-11-04

## 2024-11-04 RX ADMIN — Medication 80 MILLIGRAM(S): at 05:24

## 2024-11-04 RX ADMIN — Medication 12.5 MILLIGRAM(S): at 05:24

## 2024-11-04 RX ADMIN — Medication 100 MILLIGRAM(S): at 09:27

## 2024-11-04 RX ADMIN — ANAGRELIDE HYDROCHLORIDE 0.5 MILLIGRAM(S): 0.5 CAPSULE ORAL at 09:27

## 2024-11-04 RX ADMIN — HEPARIN SODIUM 800 UNIT(S)/HR: 10000 INJECTION INTRAVENOUS; SUBCUTANEOUS at 19:21

## 2024-11-04 RX ADMIN — ANAGRELIDE HYDROCHLORIDE 0.5 MILLIGRAM(S): 0.5 CAPSULE ORAL at 13:29

## 2024-11-04 RX ADMIN — CHLORHEXIDINE GLUCONATE 1 APPLICATION(S): 40 SOLUTION TOPICAL at 09:28

## 2024-11-04 RX ADMIN — HEPARIN SODIUM 800 UNIT(S)/HR: 10000 INJECTION INTRAVENOUS; SUBCUTANEOUS at 09:06

## 2024-11-04 RX ADMIN — HEPARIN SODIUM 800 UNIT(S)/HR: 10000 INJECTION INTRAVENOUS; SUBCUTANEOUS at 07:10

## 2024-11-04 NOTE — PROGRESS NOTE ADULT - ATTENDING COMMENTS
Patient with essential thrombocytosis  Has developed evidence of heart failure while on anagrelide.  Difficult to assign all of her symptoms to this agent.  However, given the option of hydroxyurea as an alternative, will proceed to restart that as she was on it in the past (until 4/2023).  Reviewed outside chart, spoke with outside attending Dr Waller, and discussed with patient and son.  Will restart hydroxyurea 500 mg daily until discharge, and then consider to reduce to every other day dosing to improve tolerance.    Sal Perez MD  Hematology Attending
1. EDA on CKD - creatinine improved.  More recent creatinine was 2.43 10/2024.  Diuresis per Cardiology.  May be developing contraction alkalosis. Repeat labs 11/4.  Further recommendations per clinical course.

## 2024-11-04 NOTE — PROGRESS NOTE ADULT - SUBJECTIVE AND OBJECTIVE BOX
[facility], Division of Infectious Diseases  JOSE Carter Y. Patel, S. Shah, G. [facility]  [phone]    Name: TRIP MAGALLANES  Age: 84y  Gender: Female  MRN: [id number]    Interval History:  Patient seen and examined at bedside  No acute overnight events. Afebrile  Notes reviewed    Antibiotics:      Medications:  allopurinol 100 milliGRAM(s) Oral daily  chlorhexidine 2% Cloths 1 Application(s) Topical daily  furosemide    Tablet 80 milliGRAM(s) Oral every 12 hours  heparin   Injectable 2000 Unit(s) IV Push every 6 hours PRN  heparin   Injectable 4500 Unit(s) IV Push every 6 hours PRN  heparin  Infusion.  Unit(s)/Hr IV Continuous <Continuous>  influenza  Vaccine (HIGH DOSE) 0.5 milliLiter(s) IntraMuscular once  metoprolol tartrate 25 milliGRAM(s) Oral two times a day      Review of Systems:  A 10-point review of systems was obtained.   Review of systems otherwise negative except as previously noted.    Allergies: No Known Drug Allergies  eggs (Unknown)    For details regarding the patient's past medical history, social history, family history, and other miscellaneous elements, please refer the initial infectious diseases consultation and/or the admitting history and physical examination for this admission.    Objective:  Vitals:   T(C): 36.4 (11-04-24 @ 13:16), Max: 36.4 (11-03-24 @ 20:06)  HR: 105 (11-04-24 @ 13:20) (72 - 107)  BP: 98/56 (11-04-24 @ 13:20) (98/56 - 128/71)  RR: 18 (11-04-24 @ 13:20) (18 - 18)  SpO2: 98% (11-04-24 @ 13:20) (95% - 98%)    Physical Examination:  General: no acute distress  HEENT: NC/AT, EOMI,  Cardio: RRR  Resp: decreased breath sounds  Abd: soft, NT, ND  Ext: no edema or cyanosis  Skin: warm, dry, no visible rash      Laboratory Studies:  CBC:                       9.6    28.22 )-----------( 714      ( 04 Nov 2024 06:00 )             32.3     CMP: 11-04    133[L]  |  92[L]  |  74[H]  ----------------------------<  73  4.2   |  25  |  3.34[H]    Ca    8.8      04 Nov 2024 06:00  Phos  4.2     11-04  Mg     2.10     11-04    TPro  6.0  /  Alb  3.0[L]  /  TBili  1.3[H]  /  DBili  x   /  AST  39[H]  /  ALT  70[H]  /  AlkPhos  135[H]  11-04    LIVER FUNCTIONS - ( 04 Nov 2024 06:00 )  Alb: 3.0 g/dL / Pro: 6.0 g/dL / ALK PHOS: 135 U/L / ALT: 70 U/L / AST: 39 U/L / GGT: x           Urinalysis Basic - ( 04 Nov 2024 06:00 )    Color: x / Appearance: x / SG: x / pH: x  Gluc: 73 mg/dL / Ketone: x  / Bili: x / Urobili: x   Blood: x / Protein: x / Nitrite: x   Leuk Esterase: x / RBC: x / WBC x   Sq Epi: x / Non Sq Epi: x / Bacteria: x        Microbiology: reviewed    Culture - Urine (collected 10-29-24 @ 09:31)  Source: Clean Catch Clean Catch (Midstream)  Final Report (10-30-24 @ 06:36):    <10,000 CFU/mL Normal Urogenital Kimberly    Culture - Blood (collected 10-28-24 @ 19:10)  Source: .Blood BLOOD  Final Report (11-03-24 @ 01:19):    No growth at 5 days    Culture - Blood (collected 10-28-24 @ 19:05)  Source: .Blood BLOOD  Final Report (11-03-24 @ 01:19):    No growth at 5 days          Radiology: reviewed

## 2024-11-04 NOTE — PROGRESS NOTE ADULT - SUBJECTIVE AND OBJECTIVE BOX
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  Collateral obtained from son and daughter in law who is her PCP. She has been unwell for the past week, lives above them on the second floor and at baseline is independent and ambulatory, but has been in bed and with poor PO intake over the past week. Per son had significant leg edema preventing her from walking, also w/ poor PO intake. Appeared to have shortness of breath. Symptoms started last Monday.  In the ED was in afib w/ RVR. Labs significant for WBC 49, tbili 2.0, alk phos 219, AST 1198, , Cr. 4.3 (baseline mid 2s), metabolic acidosis with lactate elevation to 5, VBG 7.23/37/15/51, proBNP 35k, Trop 78 --> 69, Ca 8.2. Abdominal US with gallbladder wall thickening.   S/p zosyn, lasix 40 mg IVP x2, metoprolol 25mg, lopressor IV 5mg x3, sodium bicarb 50 IVP, diltiazem 30mg.    (29 Oct 2024 05:17)    Somnolent after given ativan.  Able to lie flat.  AFib was rapid on arrival, now rate-controlled after IV AVN blockers.  Not able to participate in HPI/ROS due to somnolence at this time.  11/1- no overnight issues.  11/2 pt offers no complaints , no  events overnight  11/3 pt is resting in bed, ROS -   Date of service 11/4- resting in bed, able to lie flat, no new complaints.      PAST MEDICAL & SURGICAL HISTORY:  Thrombocytosis  Hypertension  Hyperlipidemia  Afib  CHF (congestive heart failure)  No significant past surgical history      allopurinol 100 milliGRAM(s) Oral daily  anagrelide 0.5 milliGRAM(s) Oral <User Schedule>  chlorhexidine 2% Cloths 1 Application(s) Topical daily  furosemide    Tablet 80 milliGRAM(s) Oral every 12 hours  heparin   Injectable 4500 Unit(s) IV Push every 6 hours PRN  heparin   Injectable 2000 Unit(s) IV Push every 6 hours PRN  heparin  Infusion.  Unit(s)/Hr IV Continuous <Continuous>  influenza  Vaccine (HIGH DOSE) 0.5 milliLiter(s) IntraMuscular once  metoprolol tartrate 12.5 milliGRAM(s) Oral two times a day                        9.7    31.73 )-----------( 713      ( 03 Nov 2024 07:10 )             31.5       11-03    135  |  92[L]  |  79[H]  ----------------------------<  88  3.9   |  27  |  3.12[H]    Ca    8.8      03 Nov 2024 07:10  Phos  3.8     11-03  Mg     2.00     11-03    TPro  5.8[L]  /  Alb  3.0[L]  /  TBili  1.4[H]  /  DBili  x   /  AST  31  /  ALT  85[H]  /  AlkPhos  128[H]  11-03    T(C): 36.4 (11-04-24 @ 05:20), Max: 36.8 (11-03-24 @ 12:21)  HR: 98 (11-04-24 @ 05:20) (72 - 107)  BP: 128/71 (11-04-24 @ 05:20) (100/68 - 128/71)  RR: 18 (11-04-24 @ 05:20) (18 - 18)  SpO2: 98% (11-04-24 @ 05:20) (96% - 100%)  Wt(kg): --    I&O's Summary    03 Nov 2024 07:01  -  04 Nov 2024 07:00  --------------------------------------------------------  IN: 400 mL / OUT: 600 mL / NET: -200 mL    Appearance: Normal size elderly woman, somnolent after sedation.	  HEENT:   Normal oral mucosa, PERRL, EOMI	  Lymphatic: No lymphadenopathy , + edema in BL feet  Cardiovascular: irregular S1 S2, No JVD, No murmurs , Peripheral pulses palpable 2+ bilaterally  Respiratory: Lungs clear to auscultation, normal effort 	  Gastrointestinal:  Soft, Non-tender, + BS	  Skin: No rashes, No ecchymoses, No cyanosis, warm to touch  Musculoskeletal: No spontaneous movement due to sedation.  Psychiatry:  Mood & affect unable to determine due to sedation.    TELEMETRY: AF RVR --> rate controlled	    ECG: AF / RVR    ASSESSMENT/PLAN: Ms Guerin is an 84y Female brought in from home with altered mental status.  When awake, can resume oral metoprolol 50mg PO Q6hrs, with holding parameters for HR<50 at rest.  Cont HEPARIN pending resolution of EDA, and potential for invasive procedures while hospitalized.  Dose of apixaban on discharge would be 2.5mg BID, as she is >80yo and weight is <60kg.  No invasive EP procedures planned.     Jero Waite M.D.  Cardiac Electrophysiology  [phone]

## 2024-11-04 NOTE — PROGRESS NOTE ADULT - SUBJECTIVE AND OBJECTIVE BOX
DATE OF SERVICE: 11-04-24    Patient denies chest pain or shortness of breath.   Review of symptoms otherwise negative.    MEDICATIONS:  allopurinol 100 milliGRAM(s) Oral daily  anagrelide 0.5 milliGRAM(s) Oral <User Schedule>  chlorhexidine 2% Cloths 1 Application(s) Topical daily  furosemide    Tablet 80 milliGRAM(s) Oral every 12 hours  heparin   Injectable 2000 Unit(s) IV Push every 6 hours PRN  heparin   Injectable 4500 Unit(s) IV Push every 6 hours PRN  heparin  Infusion.  Unit(s)/Hr IV Continuous <Continuous>  influenza  Vaccine (HIGH DOSE) 0.5 milliLiter(s) IntraMuscular once  metoprolol tartrate 12.5 milliGRAM(s) Oral two times a day                            9.6    28.22 )-----------( 714      ( 04 Nov 2024 06:00 )             32.3     133[L]  |  92[L]  |  74[H]  ----------------------------<  73  4.2   |  25  |  3.34[H]    Ca    8.8      04 Nov 2024 06:00  Phos  4.2     11-04  Mg     2.10     11-04    TPro  6.0  /  Alb  3.0[L]  /  TBili  1.3[H]  /  DBili  x   /  AST  39[H]  /  ALT  70[H]  /  AlkPhos  135[H]  11-04      T(C): 36.4 (11-04-24 @ 05:20), Max: 36.8 (11-03-24 @ 12:21)  HR: 98 (11-04-24 @ 05:20) (72 - 107)  BP: 128/71 (11-04-24 @ 05:20) (100/68 - 128/71)  RR: 18 (11-04-24 @ 05:20) (18 - 18)  SpO2: 98% (11-04-24 @ 05:20) (96% - 100%)  Wt(kg): --    I&O's Summary    03 Nov 2024 07:01  -  04 Nov 2024 07:00  --------------------------------------------------------  IN: 400 mL / OUT: 600 mL / NET: -200 mL    General: Well nourished in no acute distress. Alert and Oriented * 3.   Head: Normocephalic and atraumatic.   Neck: + JVD. No bruits. Supple. Does not appear to be enlarged.   Cardiovascular: + S1,S2 ; RRR Soft systolic murmur at the left lower sternal border. No rubs noted.    Lungs: CTA b/l. No rhonchi, rales or wheezes.   Abdomen: + BS, soft. Non tender. Non distended. No rebound. No guarding.   Extremities: No clubbing/cyanosis/1+ LE edema   Neurologic: Moves all four extremities. Full range of motion.   Skin: Warm and moist. The patient's skin has normal elasticity and good skin turgor.   Psychiatric: Appropriate mood and affect.  Musculoskeletal: Normal range of motion, normal strength    DATA    TELEMETRY: 	AF      ECG:  Afib with RVR	    < from: CT Chest No Cont (10.29.24 @ 09:53) >  IMPRESSION:  *  Bilateral pulmonary ground glass opacity consistent with edema versus   pneumonia.  *  Trace bilateral pleural effusions, ascites, and anasarca.  *  No evidence of acute infectious process in the abdomen and pelvis.  *  Bilateral renal cysts and right renal atrophy. No hydronephrosis.    < end of copied text >    < from: CT Head No Cont (10.29.24 @ 09:51) >  IMPRESSION:    No acute pathology.    < end of copied text >      < from: TTE W or WO Ultrasound Enhancing Agent (10.29.24 @ 13:17) >  CONCLUSIONS:      1. Left ventricular endocardium is not well visualized; however, the left ventricular systolic function appears grossly normal.   2. Enlarged right ventricular cavity size and reduced right ventricular systolic function.   3. There is calcification of the aortic valve leaflets. There is severe aortic stenosis. The peak transaortic velocity is 2.33 m/s, peak transaortic gradient is 21.8 mmHg and mean transaortic gradient is 13.3 mmHg with an LVOT/aortic valve VTI ratio of 0.39. The aortic valve area is estimated at 1.00 cm² by the continuity equation.   4. Structurally normal mitral valve with normal leaflet excursion. There is calcification of the mitral valve annulus. There is moderate to severe mitral regurgitation.   5. Structurally normal tricuspid valve with normal leaflet excursion. There is moderate to severe tricuspid regurgitation.   6. No pericardial effusion seen.   7. The inferior vena cava is dilated (dilated >2.1cm) with abnormal inspiratory collapse (abnormal <50%) consistent with elevated right atrial pressure (~15, range 10-20mmHg).   8. Technically difficult image quality.    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    #Rapid Afib  -- in the setting of multiple medical issues (volume overload, elevated WBC from baseline, EDA, transaminitis)  --cont Lopressor, uptitrate to 25 bid   -- Eliquis on hold given supratherapeutic INR, s/p Vit K, Heme following    #Volume Overload  -- pt with known valvular disease  -- TTE with severe AS, mod-sev MR, mod-sev TR  -- transitioned to oral lasix  -- strict I/Os  -- clarify overall GOC and prognosis from heme perspective  -- given severe valvular disease, and if within GOC then will d/w structural team about TAVR, d/w Dr. Chow who stated had eval with CT surgery in past and they had decided on conservative management    #EDA  -- Renal Following, no plans for HD at this time, EDA improving    Zara ROBERTS  [phone]

## 2024-11-04 NOTE — PROGRESS NOTE ADULT - ASSESSMENT
85 y/o woman w/ JAK2 positive ET/MPN followed at [facility] by Dr. Waller on cytoreductive therapy w/ anagrelide who presents with shortness of breath and edema. Hematology consulted for hx of MPN in the setting of leukocytosis of 49K. Differential mainly neutrophilic, no reported blast on differential. Elevated LFTs and pro-BNP along with EDA on CKD, elevated uric acid and elevated lactate and INR > 10 on labs.     Recommendations:  # JAK2 positive Essential Thrombocythemia (positive for 17.7% of cells on BM bx 7/6/20)  -Suspect leukocytosis most likely reactive from acute illness.   -Infectious workup and management per ID and primary team  -Started anagrelide 0.5 mg BID today given PLTs are rising to 700s.   - s/p Vit K 10 mg x 3 days now INR 1.2. For any suspected bleeding, would give FFP or KCentra.   - Appreciate nephro recommendations for EDA workup. If nephrology is not performing dialysis would recommend giving rasburicase 6 mg IV x 1 and starting allopurinol 100 mg daily and IVFs given hyperuriciemia  - Please hold the home eliquis to work up GIB. If AC is required and GIB stops, can start hep ggt while inpatient and monitor for bleeding.  - Will follow with Dr. Waller at [facility] upon hospital discharge.      NOTE INCOMPLETE UNTIL ATTENDING SIGNS    ***************************************************************  Maritza Lomeli, PGY5  Fellow Hematology/Oncology  pager: [phone]   Available on Microsoft Teams  After 5pm or on weekends please contact  to page on-call fellow   ***************************************************************     83 y/o woman w/ JAK2 positive ET/MPN followed at [facility] by Dr. Waller on cytoreductive therapy w/ anagrelide who presents with shortness of breath and edema. Hematology consulted for hx of MPN in the setting of leukocytosis of 49K. Differential mainly neutrophilic, no reported blast on differential. Elevated LFTs and pro-BNP along with EDA on CKD, elevated uric acid and elevated lactate and INR > 10 on labs.     Recommendations:  # JAK2 positive Essential Thrombocythemia (positive for 17.7% of cells on BM bx 7/6/20)  -Suspect leukocytosis most likely reactive from acute illness.   -Infectious workup and management per ID and primary team  -Started anagrelide 0.5 mg BID today given PLTs are rising to 700s.   - s/p Vit K 10 mg x 3 days now INR 1.2. For any suspected bleeding, would give FFP or KCentra.   - Appreciate nephro recommendations for EAD workup.   - Continue allopurinol 100 mg daily  - Please hold the home eliquis to work up GIB. If AC is required and GIB stops, can start hep ggt while inpatient and monitor for bleeding.  - Will follow with Dr. Waller at [facility] upon hospital discharge.      NOTE INCOMPLETE UNTIL ATTENDING SIGNS    ***************************************************************  Maritza Lomeli, PGY5  Fellow Hematology/Oncology  pager: [phone]   Available on Microsoft Teams  After 5pm or on weekends please contact  to page on-call fellow   ***************************************************************     83 y/o woman w/ JAK2 positive ET/MPN followed at [facility] by Dr. Waller on cytoreductive therapy w/ anagrelide who presents with shortness of breath and edema. Hematology consulted for hx of MPN in the setting of leukocytosis of 49K. Differential mainly neutrophilic, no reported blast on differential. Elevated LFTs and pro-BNP along with EDA on CKD, elevated uric acid and elevated lactate and INR > 10 on labs. This is likely in setting of heart failure causing cardiorenal.         Recommendations:  # JAK2 positive Essential Thrombocythemia (positive for 17.7% of cells on BM bx 7/6/20)  - Given anagrelide can have adverse effects on the heart, please stop anagrelide, and start hydroxyurea 500 mg daily tomorrow. This plan was discussed with Dr. Waller. Please continue hydroxyurea daily in the hospital and discharge on every other day. Orders changed by hematology.  -Suspect leukocytosis most likely reactive from acute illness.   -Infectious workup and management per ID and primary team  - s/p Vit K 10 mg x 3 days now INR 1.2. For any suspected bleeding, would give FFP or KCentra.   - Appreciate nephro recommendations for EDA workup.   - Continue allopurinol 100 mg daily  - Please hold the home eliquis to work up GIB. If AC is required and GIB stops, can start hep ggt while inpatient and monitor for bleeding.  - Will follow with Dr. Waller at [facility] upon hospital discharge.       NOTE INCOMPLETE UNTIL ATTENDING SIGNS    ***************************************************************  Maritza Lomeli, PGY5  Fellow Hematology/Oncology  pager: [phone]   Available on Microsoft Teams  After 5pm or on weekends please contact  to page on-call fellow   ***************************************************************     83 y/o woman w/ JAK2 positive ET/MPN followed at [facility] by Dr. Waller on cytoreductive therapy w/ anagrelide who presents with shortness of breath and edema. Hematology consulted for hx of MPN in the setting of leukocytosis of 49K. Differential mainly neutrophilic, no reported blast on differential. Elevated LFTs and pro-BNP along with EDA on CKD, elevated uric acid and elevated lactate and INR > 10 on labs. This is likely in setting of heart failure causing cardiorenal.         Recommendations:  # JAK2 positive Essential Thrombocythemia (positive for 17.7% of cells on BM bx 7/6/20)  - Given anagrelide can have adverse effects on the heart, please stop anagrelide, and start hydroxyurea 500 mg daily tomorrow. This plan was discussed with Dr. Waller. Please continue hydroxyurea daily in the hospital and discharge on every other day. Orders changed by hematology.  -Suspect leukocytosis most likely reactive from acute illness.   -Infectious workup and management per ID and primary team  - s/p Vit K 10 mg x 3 days now INR 1.2. For any suspected bleeding, would give FFP or KCentra.   - Appreciate nephro recommendations for EDA workup.   - Continue allopurinol 100 mg daily  - Please hold the home eliquis to work up GIB. If AC is required and GIB stops, can start hep ggt while inpatient and monitor for bleeding.  - Monitor CBC w/ diff daily  - Will follow with Dr. Waller at [facility] upon hospital discharge.       NOTE INCOMPLETE UNTIL ATTENDING SIGNS    ***************************************************************  Maritza Lomeli, PGY5  Fellow Hematology/Oncology  pager: [phone]   Available on Microsoft Teams  After 5pm or on weekends please contact  to page on-call fellow   ***************************************************************

## 2024-11-04 NOTE — PROVIDER CONTACT NOTE (OTHER) - SITUATION
Pt BP 90/58. Pt asymptomatic, resting in bed. Denies SOB/chest pain, discomfort and pain at this time.

## 2024-11-04 NOTE — PROGRESS NOTE ADULT - SUBJECTIVE AND OBJECTIVE BOX
INTERVAL HPI/OVERNIGHT EVENTS:  No overnight events.     MEDICATIONS  (STANDING):  allopurinol 100 milliGRAM(s) Oral daily  anagrelide 0.5 milliGRAM(s) Oral <User Schedule>  chlorhexidine 2% Cloths 1 Application(s) Topical daily  furosemide    Tablet 80 milliGRAM(s) Oral every 12 hours  heparin  Infusion.  Unit(s)/Hr (11 mL/Hr) IV Continuous <Continuous>  influenza  Vaccine (HIGH DOSE) 0.5 milliLiter(s) IntraMuscular once  metoprolol tartrate 12.5 milliGRAM(s) Oral two times a day    MEDICATIONS  (PRN):  heparin   Injectable 4500 Unit(s) IV Push every 6 hours PRN For aPTT less than 40  heparin   Injectable 2000 Unit(s) IV Push every 6 hours PRN For aPTT between 40 - 57    Allergies    No Known Drug Allergies  eggs (Unknown)    Intolerances          VITAL SIGNS:  T(F): 97.6 (11-04-24 @ 05:20)  HR: 98 (11-04-24 @ 05:20)  BP: 128/71 (11-04-24 @ 05:20)  RR: 18 (11-04-24 @ 05:20)  SpO2: 98% (11-04-24 @ 05:20)  Wt(kg): --    PHYSICAL EXAM:    GENERAL: NAD  CHEST/LUNG: ctab  HEART: Irregularly irregular  ABDOMEN: Soft, Nontender, Nondistended  EXTREMITIES:  b/l LE edema+ L>R improving  PSYCH: calm now  NEUROLOGY: Ax3    LABS:                        9.6    28.22 )-----------( 714      ( 04 Nov 2024 06:00 )             32.3     11-04    133[L]  |  92[L]  |  74[H]  ----------------------------<  73  4.2   |  25  |  3.34[H]    Ca    8.8      04 Nov 2024 06:00  Phos  4.2     11-04  Mg     2.10     11-04    TPro  6.0  /  Alb  3.0[L]  /  TBili  1.3[H]  /  DBili  x   /  AST  39[H]  /  ALT  70[H]  /  AlkPhos  135[H]  11-04    PT/INR - ( 04 Nov 2024 06:00 )   PT: 13.6 sec;   INR: 1.17 ratio         PTT - ( 04 Nov 2024 06:00 )  PTT:76.2 sec  Urinalysis Basic - ( 04 Nov 2024 06:00 )    Color: x / Appearance: x / SG: x / pH: x  Gluc: 73 mg/dL / Ketone: x  / Bili: x / Urobili: x   Blood: x / Protein: x / Nitrite: x   Leuk Esterase: x / RBC: x / WBC x   Sq Epi: x / Non Sq Epi: x / Bacteria: x        RADIOLOGY & ADDITIONAL TESTS:  Studies reviewed.

## 2024-11-04 NOTE — PROGRESS NOTE ADULT - SUBJECTIVE AND OBJECTIVE BOX
[facility] Division of Kidney Diseases & Hypertension  FOLLOW UP NOTE  [phone]--------------------------------------------------------------------------------    Chief Complaint: EDA on CKD, metabolic acidosis    24 hour events/subjective: Pt. was seen and evaluated at bedside earlier today. Pt. reports feeling well, offers no complaints. Denies any headaches, fevers/chills, chest pain, and abdominal pain.      PAST HISTORY  --------------------------------------------------------------------------------  No significant changes to PMH, PSH, FHx, SHx, unless otherwise noted    ALLERGIES & MEDICATIONS  --------------------------------------------------------------------------------  Allergies    No Known Drug Allergies  eggs (Unknown)    Intolerances      Standing Inpatient Medications  allopurinol 100 milliGRAM(s) Oral daily  anagrelide 0.5 milliGRAM(s) Oral <User Schedule>  chlorhexidine 2% Cloths 1 Application(s) Topical daily  furosemide    Tablet 80 milliGRAM(s) Oral every 12 hours  heparin  Infusion.  Unit(s)/Hr IV Continuous <Continuous>  influenza  Vaccine (HIGH DOSE) 0.5 milliLiter(s) IntraMuscular once  metoprolol tartrate 12.5 milliGRAM(s) Oral two times a day    PRN Inpatient Medications  heparin   Injectable 4500 Unit(s) IV Push every 6 hours PRN  heparin   Injectable 2000 Unit(s) IV Push every 6 hours PRN      REVIEW OF SYSTEMS  --------------------------------------------------------------------------------  Gen: No fevers/chills  Skin: No rashes  Head/Eyes/Ears/Mouth: No headache  Respiratory: +Dyspnea (improved)  CV: No chest pain  GI: No abdominal pain  MSK: +BL LE edema (improving)  Neuro: No dizziness/lightheadedness    All other systems were reviewed and are negative, except as noted.     VITALS/PHYSICAL EXAM  --------------------------------------------------------------------------------  T(C): 36.4 (11-04-24 @ 05:20), Max: 36.8 (11-03-24 @ 12:21)  HR: 98 (11-04-24 @ 05:20) (72 - 107)  BP: 128/71 (11-04-24 @ 05:20) (100/68 - 128/71)  RR: 18 (11-04-24 @ 05:20) (18 - 18)  SpO2: 98% (11-04-24 @ 05:20) (96% - 100%)  Wt(kg): --    11-03-24 @ 07:01  -  11-04-24 @ 07:00  --------------------------------------------------------  IN: 400 mL / OUT: 600 mL / NET: -200 mL    Physical Exam:  Gen: Chronically ill appearing but in NAD  HEENT: Anicteric  Pulm: CTABL  CV: S1S2+  Abd: Soft, +BS    Ext: +Mild LE edema B/L (L>R), improving  Neuro: Awake  Skin: Warm and dry    LABS/STUDIES  --------------------------------------------------------------------------------              9.6    28.22 >-----------<  714      [11-04-24 @ 06:00]              32.3     133  |  92  |  74  ----------------------------<  73      [11-04-24 @ 06:00]  4.2   |  25  |  3.34        Ca     8.8     [11-04-24 @ 06:00]      Mg     2.10     [11-04-24 @ 06:00]      Phos  4.2     [11-04-24 @ 06:00]    TPro  6.0  /  Alb  3.0  /  TBili  1.3  /  DBili  x   /  AST  39  /  ALT  70  /  AlkPhos  135  [11-04-24 @ 06:00]    PT/INR: PT 13.6 , INR 1.17       [11-04-24 @ 06:00]  PTT: 76.2       [11-04-24 @ 06:00]    Creatinine Trend:  SCr 3.34 [11-04 @ 06:00]  SCr 3.12 [11-03 @ 07:10]  SCr 3.25 [11-02 @ 05:53]  SCr 3.58 [11-01 @ 03:40]  SCr 3.74 [10-31 @ 05:20]    Urine Creatinine 49      [10-29-24 @ 08:28]  Urine Protein 13      [10-29-24 @ 08:28]  Urine Sodium 68      [10-29-24 @ 08:28]  Urine Urea Nitrogen 387.0      [10-29-24 @ 08:28]  Urine Potassium 16.0      [10-29-24 @ 08:28]  Urine Osmolality 347      [10-29-24 @ 08:28]    TSH 3.38      [10-29-24 @ 06:49]    HCV 0.29, Nonreact      [10-29-24 @ 06:49]

## 2024-11-04 NOTE — PROGRESS NOTE ADULT - ASSESSMENT
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  CT abd/pelvis- no evidence of acute infectious process. right renal cyst contains mildly thick septations  abd US- 1.9 cm gallstone. Mild gallbladder wall thickening. No pericholecystic fluid. equivocal for cholecystitis.  s/p surgery eval- low suspicion for cholecystitis, not a surgical candidate  MRSA PCR negative  S/p zosyn x5 day course completed 11/2    CHF, elevated liver enzymes  c/f congestive hepatopathy  b/l LE edema  pleural effusions, ascites, anasarca on CT  TTE- severe aortic stenosis, moderate to severe MR & TR    ET/MPN  leukocytosis likely significantly in part 2/2 ET/MPN  LDH, uric acid elevated  - rasburicase given, on allopurinol    EDA  nephrology following    Recommendations  Monitor off ABx  Trend temps/WBC  heme/onc following  cardiology following  additional care per primary team    Please call with any questions.  Ramona Valle M.D.  [facility] Division of Infectious Diseases [phone]  For after 5 P.M. and weekends, please call [phone]  Available on [employer] TEAMS

## 2024-11-04 NOTE — PROGRESS NOTE ADULT - PROBLEM SELECTOR PLAN 9
Diet: regular diet  DVT ppx; on heparin drip  Dispo: inpt  Pall care consult appreciated, pt is full code   PT rec rehab, family will make a decision about rehab    Plan discussed with ACP

## 2024-11-04 NOTE — PROGRESS NOTE ADULT - ASSESSMENT
84-year-old female history of A-fib on Eliquis, CHF on Lasix and Entresto, hypotension, w/ JAK2 positive ET/MPN on cytoreductive therapy w/ anagrelide, p/w SOB, fatigue, and increased leg swelling found to have acute renal failure, transaminitis and leukocytosis 2/2 acute on chronic HFrEF c/b congestive hepatopathy.

## 2024-11-04 NOTE — PROVIDER CONTACT NOTE (OTHER) - ASSESSMENT
Pt BP 90/58. Pt asymptomatic, resting in bed. Denies SOB/chest pain, discomfort and pain at this time.
patient asymptomatic, NSR, vitals stable

## 2024-11-04 NOTE — PROGRESS NOTE ADULT - SUBJECTIVE AND OBJECTIVE BOX
Bhargav Hernández MD  Division of Hospitalist Medicine  Pager [phone]  Available on Teams    CC: Patient is a 84y old  Female who presents with a chief complaint of shortness of breath and leg edema (04 Nov 2024 14:52)        SUBJECTIVE / INTERVAL HPI: Patient seen and examined at bedside. Pt grossly denies any acute complaints.     ROS: negative unless otherwise stated above.    VITAL SIGNS:  Vital Signs Last 24 Hrs  T(C): 36.3 (04 Nov 2024 17:30), Max: 36.4 (03 Nov 2024 20:06)  T(F): 97.3 (04 Nov 2024 17:30), Max: 97.6 (04 Nov 2024 05:20)  HR: 109 (04 Nov 2024 17:30) (98 - 109)  BP: 90/58 (04 Nov 2024 17:30) (90/58 - 128/71)  BP(mean): --  RR: 18 (04 Nov 2024 17:30) (18 - 18)  SpO2: 99% (04 Nov 2024 17:30) (95% - 99%)    Parameters below as of 04 Nov 2024 17:30  Patient On (Oxygen Delivery Method): room air          11-03-24 @ 07:01  -  11-04-24 @ 07:00  --------------------------------------------------------  IN: 400 mL / OUT: 600 mL / NET: -200 mL        PHYSICAL EXAM:    General: NAD  HEENT: MMM  Neck: supple  Cardiovascular: +S1/S2; RRR  Respiratory: CTA B/L; no W/R/R  Gastrointestinal: soft, NT/ND  Extremities: WWP; no edema, clubbing or cyanosis  Neurological: awake and alert; communicating and able to follow commands without appreciated focal neurologic deficits    MEDICATIONS:  MEDICATIONS  (STANDING):  allopurinol 100 milliGRAM(s) Oral daily  chlorhexidine 2% Cloths 1 Application(s) Topical daily  furosemide    Tablet 80 milliGRAM(s) Oral every 12 hours  heparin  Infusion.  Unit(s)/Hr (11 mL/Hr) IV Continuous <Continuous>  influenza  Vaccine (HIGH DOSE) 0.5 milliLiter(s) IntraMuscular once  metoprolol tartrate 25 milliGRAM(s) Oral two times a day    MEDICATIONS  (PRN):  heparin   Injectable 2000 Unit(s) IV Push every 6 hours PRN For aPTT between 40 - 57  heparin   Injectable 4500 Unit(s) IV Push every 6 hours PRN For aPTT less than 40      ALLERGIES:  Allergies    No Known Drug Allergies  eggs (Unknown)    Intolerances        LABS:                        9.6    28.22 )-----------( 714      ( 04 Nov 2024 06:00 )             32.3     11-04    133[L]  |  92[L]  |  74[H]  ----------------------------<  73  4.2   |  25  |  3.34[H]    Ca    8.8      04 Nov 2024 06:00  Phos  4.2     11-04  Mg     2.10     11-04    TPro  6.0  /  Alb  3.0[L]  /  TBili  1.3[H]  /  DBili  x   /  AST  39[H]  /  ALT  70[H]  /  AlkPhos  135[H]  11-04    PT/INR - ( 04 Nov 2024 06:00 )   PT: 13.6 sec;   INR: 1.17 ratio         PTT - ( 04 Nov 2024 06:00 )  PTT:76.2 sec  Urinalysis Basic - ( 04 Nov 2024 06:00 )    Color: x / Appearance: x / SG: x / pH: x  Gluc: 73 mg/dL / Ketone: x  / Bili: x / Urobili: x   Blood: x / Protein: x / Nitrite: x   Leuk Esterase: x / RBC: x / WBC x   Sq Epi: x / Non Sq Epi: x / Bacteria: x      CAPILLARY BLOOD GLUCOSE          RADIOLOGY & ADDITIONAL TESTS: Reviewed.

## 2024-11-05 LAB
ANION GAP SERPL CALC-SCNC: 18 MMOL/L — HIGH (ref 7–14)
APTT BLD: 87.1 SEC — HIGH (ref 24.5–35.6)
BUN SERPL-MCNC: 65 MG/DL — HIGH (ref 7–23)
CALCIUM SERPL-MCNC: 8.8 MG/DL — SIGNIFICANT CHANGE UP (ref 8.4–10.5)
CHLORIDE SERPL-SCNC: 93 MMOL/L — LOW (ref 98–107)
CO2 SERPL-SCNC: 25 MMOL/L — SIGNIFICANT CHANGE UP (ref 22–31)
CREAT SERPL-MCNC: 2.87 MG/DL — HIGH (ref 0.5–1.3)
EGFR: 16 ML/MIN/1.73M2 — LOW
GLUCOSE SERPL-MCNC: 59 MG/DL — LOW (ref 70–99)
HCT VFR BLD CALC: 31.1 % — LOW (ref 34.5–45)
HGB BLD-MCNC: 9.1 G/DL — LOW (ref 11.5–15.5)
MAGNESIUM SERPL-MCNC: 2.1 MG/DL — SIGNIFICANT CHANGE UP (ref 1.6–2.6)
MCHC RBC-ENTMCNC: 23.7 PG — LOW (ref 27–34)
MCHC RBC-ENTMCNC: 29.3 G/DL — LOW (ref 32–36)
MCV RBC AUTO: 81 FL — SIGNIFICANT CHANGE UP (ref 80–100)
NRBC # BLD: 0 /100 WBCS — SIGNIFICANT CHANGE UP (ref 0–0)
NRBC # FLD: 0.07 K/UL — HIGH (ref 0–0)
PHOSPHATE SERPL-MCNC: 4.3 MG/DL — SIGNIFICANT CHANGE UP (ref 2.5–4.5)
PLATELET # BLD AUTO: 736 K/UL — HIGH (ref 150–400)
POTASSIUM SERPL-MCNC: 3.9 MMOL/L — SIGNIFICANT CHANGE UP (ref 3.5–5.3)
POTASSIUM SERPL-SCNC: 3.9 MMOL/L — SIGNIFICANT CHANGE UP (ref 3.5–5.3)
RBC # BLD: 3.84 M/UL — SIGNIFICANT CHANGE UP (ref 3.8–5.2)
RBC # FLD: 26.2 % — HIGH (ref 10.3–14.5)
SODIUM SERPL-SCNC: 136 MMOL/L — SIGNIFICANT CHANGE UP (ref 135–145)
WBC # BLD: 27.02 K/UL — HIGH (ref 3.8–10.5)
WBC # FLD AUTO: 27.02 K/UL — HIGH (ref 3.8–10.5)

## 2024-11-05 PROCEDURE — 99232 SBSQ HOSP IP/OBS MODERATE 35: CPT

## 2024-11-05 RX ORDER — FUROSEMIDE 40 MG
80 TABLET ORAL DAILY
Refills: 0 | Status: DISCONTINUED | OUTPATIENT
Start: 2024-11-06 | End: 2024-11-07

## 2024-11-05 RX ORDER — APIXABAN 5 MG/1
2.5 TABLET, FILM COATED ORAL
Refills: 0 | Status: DISCONTINUED | OUTPATIENT
Start: 2024-11-05 | End: 2024-11-07

## 2024-11-05 RX ADMIN — Medication 100 MILLIGRAM(S): at 11:22

## 2024-11-05 RX ADMIN — Medication 25 MILLIGRAM(S): at 17:37

## 2024-11-05 RX ADMIN — APIXABAN 2.5 MILLIGRAM(S): 5 TABLET, FILM COATED ORAL at 13:50

## 2024-11-05 RX ADMIN — CHLORHEXIDINE GLUCONATE 1 APPLICATION(S): 40 SOLUTION TOPICAL at 11:22

## 2024-11-05 RX ADMIN — HEPARIN SODIUM 800 UNIT(S)/HR: 10000 INJECTION INTRAVENOUS; SUBCUTANEOUS at 07:25

## 2024-11-05 RX ADMIN — Medication 80 MILLIGRAM(S): at 06:20

## 2024-11-05 RX ADMIN — APIXABAN 2.5 MILLIGRAM(S): 5 TABLET, FILM COATED ORAL at 22:48

## 2024-11-05 RX ADMIN — Medication 500 MILLIGRAM(S): at 12:29

## 2024-11-05 RX ADMIN — Medication 25 MILLIGRAM(S): at 06:20

## 2024-11-05 RX ADMIN — HEPARIN SODIUM 800 UNIT(S)/HR: 10000 INJECTION INTRAVENOUS; SUBCUTANEOUS at 08:42

## 2024-11-05 NOTE — PROGRESS NOTE ADULT - SUBJECTIVE AND OBJECTIVE BOX
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  Collateral obtained from son and daughter in law who is her PCP. She has been unwell for the past week, lives above them on the second floor and at baseline is independent and ambulatory, but has been in bed and with poor PO intake over the past week. Per son had significant leg edema preventing her from walking, also w/ poor PO intake. Appeared to have shortness of breath. Symptoms started last Monday.  In the ED was in afib w/ RVR. Labs significant for WBC 49, tbili 2.0, alk phos 219, AST 1198, , Cr. 4.3 (baseline mid 2s), metabolic acidosis with lactate elevation to 5, VBG 7.23/37/15/51, proBNP 35k, Trop 78 --> 69, Ca 8.2. Abdominal US with gallbladder wall thickening.   S/p zosyn, lasix 40 mg IVP x2, metoprolol 25mg, lopressor IV 5mg x3, sodium bicarb 50 IVP, diltiazem 30mg.    (29 Oct 2024 05:17)    Somnolent after given ativan.  Able to lie flat.  AFib was rapid on arrival, now rate-controlled after IV AVN blockers.  Not able to participate in HPI/ROS due to somnolence at this time.  Date of service 11/5- resting in bed, no overnight issues.      PAST MEDICAL & SURGICAL HISTORY:  Thrombocytosis  Hypertension  Hyperlipidemia  Afib  CHF (congestive heart failure)  No significant past surgical history      allopurinol 100 milliGRAM(s) Oral daily  chlorhexidine 2% Cloths 1 Application(s) Topical daily  furosemide    Tablet 80 milliGRAM(s) Oral every 12 hours  heparin   Injectable 2000 Unit(s) IV Push every 6 hours PRN  heparin   Injectable 4500 Unit(s) IV Push every 6 hours PRN  heparin  Infusion.  Unit(s)/Hr IV Continuous <Continuous>  hydroxyurea 500 milliGRAM(s) Oral daily  influenza  Vaccine (HIGH DOSE) 0.5 milliLiter(s) IntraMuscular once  metoprolol tartrate 25 milliGRAM(s) Oral two times a day                            9.6    28.22 )-----------( 714      ( 04 Nov 2024 06:00 )             32.3       11-04    133[L]  |  92[L]  |  74[H]  ----------------------------<  73  4.2   |  25  |  3.34[H]    Ca    8.8      04 Nov 2024 06:00  Phos  4.2     11-04  Mg     2.10     11-04    TPro  6.0  /  Alb  3.0[L]  /  TBili  1.3[H]  /  DBili  x   /  AST  39[H]  /  ALT  70[H]  /  AlkPhos  135[H]  11-04    T(C): 36.7 (11-05-24 @ 07:48), Max: 36.7 (11-05-24 @ 07:48)  HR: 90 (11-05-24 @ 07:48) (90 - 109)  BP: 104/47 (11-05-24 @ 07:48) (90/58 - 130/74)  RR: 16 (11-05-24 @ 07:48) (16 - 18)  SpO2: 98% (11-05-24 @ 07:48) (95% - 99%)  Wt(kg): --    I&O's Summary    04 Nov 2024 07:01  -  05 Nov 2024 07:00  --------------------------------------------------------  IN: 240 mL / OUT: 0 mL / NET: 240 mL    Appearance: Normal size elderly woman, somnolent after sedation.	  HEENT:   Normal oral mucosa, PERRL, EOMI	  Lymphatic: No lymphadenopathy , + edema in BL feet  Cardiovascular: irregular S1 S2, No JVD, No murmurs , Peripheral pulses palpable 2+ bilaterally  Respiratory: Lungs clear to auscultation, normal effort 	  Gastrointestinal:  Soft, Non-tender, + BS	  Skin: No rashes, No ecchymoses, No cyanosis, warm to touch  Musculoskeletal: No spontaneous movement due to sedation.  Psychiatry:  Mood & affect unable to determine due to sedation.    TELEMETRY: AF RVR --> rate controlled	    ECG: AF / RVR    ASSESSMENT/PLAN: Ms Guerin is an 84y Female brought in from home with altered mental status.  When awake, can resume oral metoprolol 50mg PO Q6hrs, with holding parameters for HR<50 at rest.  Cont HEPARIN pending resolution of EDA, and potential for invasive procedures while hospitalized.  Dose of apixaban on discharge would be 2.5mg BID, as she is >80yo and weight is <60kg.  No invasive EP procedures planned.     Jero Waite M.D.  Cardiac Electrophysiology  [phone]

## 2024-11-05 NOTE — PROGRESS NOTE ADULT - SUBJECTIVE AND OBJECTIVE BOX
Bhargav Hernández MD  Division of Hospitalist Medicine  Pager [phone]  Available on Teams    CC: Patient is a 84y old  Female who presents with a chief complaint of shortness of breath and leg edema (05 Nov 2024 11:42)        SUBJECTIVE / INTERVAL HPI: Patient seen and examined at bedside. Pt grossly denies any acute complaints.     ROS: negative unless otherwise stated above.    VITAL SIGNS:  Vital Signs Last 24 Hrs  T(C): 37.1 (05 Nov 2024 15:53), Max: 37.1 (05 Nov 2024 15:53)  T(F): 98.8 (05 Nov 2024 15:53), Max: 98.8 (05 Nov 2024 15:53)  HR: 82 (05 Nov 2024 15:53) (45 - 109)  BP: 103/55 (05 Nov 2024 15:53) (90/58 - 130/74)  BP(mean): 68 (05 Nov 2024 15:53) (61 - 70)  RR: 20 (05 Nov 2024 15:53) (16 - 20)  SpO2: 98% (05 Nov 2024 15:53) (95% - 100%)    Parameters below as of 05 Nov 2024 15:53  Patient On (Oxygen Delivery Method): room air          11-04-24 @ 07:01  -  11-05-24 @ 07:00  --------------------------------------------------------  IN: 240 mL / OUT: 0 mL / NET: 240 mL        PHYSICAL EXAM:    General: NAD  HEENT: MMM  Neck: supple  Cardiovascular: +S1/S2; RRR  Respiratory: CTA B/L; no W/R/R  Gastrointestinal: soft, NT/ND  Extremities: WWP; no edema, clubbing or cyanosis  Neurological: awake and alert; communicating and able to follow commands without appreciated focal neurologic deficits    MEDICATIONS:  MEDICATIONS  (STANDING):  allopurinol 100 milliGRAM(s) Oral daily  apixaban 2.5 milliGRAM(s) Oral two times a day  chlorhexidine 2% Cloths 1 Application(s) Topical daily  hydroxyurea 500 milliGRAM(s) Oral daily  influenza  Vaccine (HIGH DOSE) 0.5 milliLiter(s) IntraMuscular once  metoprolol tartrate 25 milliGRAM(s) Oral two times a day    MEDICATIONS  (PRN):      ALLERGIES:  Allergies    No Known Drug Allergies  eggs (Unknown)    Intolerances        LABS:                        9.1    27.02 )-----------( 736      ( 05 Nov 2024 05:47 )             31.1     11-05    136  |  93[L]  |  65[H]  ----------------------------<  59[L]  3.9   |  25  |  2.87[H]    Ca    8.8      05 Nov 2024 05:47  Phos  4.3     11-05  Mg     2.10     11-05    TPro  6.0  /  Alb  3.0[L]  /  TBili  1.3[H]  /  DBili  x   /  AST  39[H]  /  ALT  70[H]  /  AlkPhos  135[H]  11-04    PT/INR - ( 04 Nov 2024 06:00 )   PT: 13.6 sec;   INR: 1.17 ratio         PTT - ( 05 Nov 2024 05:47 )  PTT:87.1 sec  Urinalysis Basic - ( 05 Nov 2024 05:47 )    Color: x / Appearance: x / SG: x / pH: x  Gluc: 59 mg/dL / Ketone: x  / Bili: x / Urobili: x   Blood: x / Protein: x / Nitrite: x   Leuk Esterase: x / RBC: x / WBC x   Sq Epi: x / Non Sq Epi: x / Bacteria: x      CAPILLARY BLOOD GLUCOSE          RADIOLOGY & ADDITIONAL TESTS: Reviewed.

## 2024-11-05 NOTE — PROGRESS NOTE ADULT - SUBJECTIVE AND OBJECTIVE BOX
[facility] Division of Kidney Diseases & Hypertension  FOLLOW UP NOTE  [phone]--------------------------------------------------------------------------------    Chief Complaint: EDA on CKD, metabolic acidosis    24 hour events/subjective: Pt. was seen and evaluated at bedside earlier today. Pt. reports feeling well, offers no complaints. Denies any headaches, fevers/chills, chest pain, and abdominal pain.      PAST HISTORY  --------------------------------------------------------------------------------  No significant changes to PMH, PSH, FHx, SHx, unless otherwise noted    ALLERGIES & MEDICATIONS  --------------------------------------------------------------------------------  Allergies    No Known Drug Allergies  eggs (Unknown)    Intolerances      Standing Inpatient Medications  allopurinol 100 milliGRAM(s) Oral daily  chlorhexidine 2% Cloths 1 Application(s) Topical daily  furosemide    Tablet 80 milliGRAM(s) Oral every 12 hours  heparin  Infusion.  Unit(s)/Hr IV Continuous <Continuous>  hydroxyurea 500 milliGRAM(s) Oral daily  influenza  Vaccine (HIGH DOSE) 0.5 milliLiter(s) IntraMuscular once  metoprolol tartrate 25 milliGRAM(s) Oral two times a day    PRN Inpatient Medications  heparin   Injectable 4500 Unit(s) IV Push every 6 hours PRN  heparin   Injectable 2000 Unit(s) IV Push every 6 hours PRN      REVIEW OF SYSTEMS  --------------------------------------------------------------------------------  Gen: No fevers/chills  Skin: No rashes  Head/Eyes/Ears/Mouth: No headache  Respiratory: +Dyspnea (improved)  CV: No chest pain  GI: No abdominal pain  MSK: +BL LE edema (improving)  Neuro: No dizziness/lightheadedness    All other systems were reviewed and are negative, except as noted.     VITALS/PHYSICAL EXAM  --------------------------------------------------------------------------------  T(C): 36.7 (11-05-24 @ 07:48), Max: 36.7 (11-05-24 @ 07:48)  HR: 90 (11-05-24 @ 07:48) (90 - 109)  BP: 104/47 (11-05-24 @ 07:48) (90/58 - 130/74)  RR: 16 (11-05-24 @ 07:48) (16 - 18)  SpO2: 98% (11-05-24 @ 07:48) (95% - 99%)  Wt(kg): --    11-04-24 @ 07:01  -  11-05-24 @ 07:00  --------------------------------------------------------  IN: 240 mL / OUT: 0 mL / NET: 240 mL    Physical Exam:  Gen: Resting, in NAD  HEENT: Anicteric  Pulm: CTABL  CV: S1S2+  Abd: Soft, +BS    Ext: +Mild LE edema B/L (L>R), improving  Neuro: Awake  Skin: Warm and dry    LABS/STUDIES  --------------------------------------------------------------------------------              9.1    27.02 >-----------<  736      [11-05-24 @ 05:47]              31.1     136  |  93  |  65  ----------------------------<  59      [11-05-24 @ 05:47]  3.9   |  25  |  2.87        Ca     8.8     [11-05-24 @ 05:47]      Mg     2.10     [11-05-24 @ 05:47]      Phos  4.3     [11-05-24 @ 05:47]    TPro  6.0  /  Alb  3.0  /  TBili  1.3  /  DBili  x   /  AST  39  /  ALT  70  /  AlkPhos  135  [11-04-24 @ 06:00]    PT/INR: PT 13.6 , INR 1.17       [11-04-24 @ 06:00]  PTT: 87.1       [11-05-24 @ 05:47]    Creatinine Trend:  SCr 2.87 [11-05 @ 05:47]  SCr 3.34 [11-04 @ 06:00]  SCr 3.12 [11-03 @ 07:10]  SCr 3.25 [11-02 @ 05:53]  SCr 3.58 [11-01 @ 03:40]

## 2024-11-05 NOTE — PROGRESS NOTE ADULT - SUBJECTIVE AND OBJECTIVE BOX
[facility], Division of Infectious Diseases  JOSE Carter Y. Patel, S. Shah, G. [facility]  [phone]    Name: TRIP MAGALLANES  Age: 84y  Gender: Female  MRN: [id number]    Interval History:  Patient seen and examined at bedside  No acute overnight events. Afebrile  No complaints  Notes reviewed    Antibiotics:      Medications:  allopurinol 100 milliGRAM(s) Oral daily  chlorhexidine 2% Cloths 1 Application(s) Topical daily  furosemide    Tablet 80 milliGRAM(s) Oral every 12 hours  heparin   Injectable 4500 Unit(s) IV Push every 6 hours PRN  heparin   Injectable 2000 Unit(s) IV Push every 6 hours PRN  heparin  Infusion.  Unit(s)/Hr IV Continuous <Continuous>  hydroxyurea 500 milliGRAM(s) Oral daily  influenza  Vaccine (HIGH DOSE) 0.5 milliLiter(s) IntraMuscular once  metoprolol tartrate 25 milliGRAM(s) Oral two times a day      Review of Systems:  A 10-point review of systems was obtained.   Review of systems otherwise negative except as previously noted.    Allergies: No Known Drug Allergies  eggs (Unknown)    For details regarding the patient's past medical history, social history, family history, and other miscellaneous elements, please refer the initial infectious diseases consultation and/or the admitting history and physical examination for this admission.    Objective:  Vitals:   T(C): 36.8 (11-05-24 @ 11:34), Max: 36.8 (11-05-24 @ 11:34)  HR: 45 (11-05-24 @ 11:34) (45 - 109)  BP: 91/56 (11-05-24 @ 11:34) (90/58 - 130/74)  RR: 18 (11-05-24 @ 11:34) (16 - 18)  SpO2: 100% (11-05-24 @ 11:34) (95% - 100%)    Physical Examination:  General: no acute distress  HEENT: NC/AT,   Cardio: RRR  Resp: decreased breath sounds  Abd: soft,   Ext: no edema or cyanosis  Skin: warm, dry, no visible rash      Laboratory Studies:  CBC:                       9.1    27.02 )-----------( 736      ( 05 Nov 2024 05:47 )             31.1     CMP: 11-05    136  |  93[L]  |  65[H]  ----------------------------<  59[L]  3.9   |  25  |  2.87[H]    Ca    8.8      05 Nov 2024 05:47  Phos  4.3     11-05  Mg     2.10     11-05    TPro  6.0  /  Alb  3.0[L]  /  TBili  1.3[H]  /  DBili  x   /  AST  39[H]  /  ALT  70[H]  /  AlkPhos  135[H]  11-04    LIVER FUNCTIONS - ( 04 Nov 2024 06:00 )  Alb: 3.0 g/dL / Pro: 6.0 g/dL / ALK PHOS: 135 U/L / ALT: 70 U/L / AST: 39 U/L / GGT: x           Urinalysis Basic - ( 05 Nov 2024 05:47 )    Color: x / Appearance: x / SG: x / pH: x  Gluc: 59 mg/dL / Ketone: x  / Bili: x / Urobili: x   Blood: x / Protein: x / Nitrite: x   Leuk Esterase: x / RBC: x / WBC x   Sq Epi: x / Non Sq Epi: x / Bacteria: x        Microbiology: reviewed    Culture - Urine (collected 10-29-24 @ 09:31)  Source: Clean Catch Clean Catch (Midstream)  Final Report (10-30-24 @ 06:36):    <10,000 CFU/mL Normal Urogenital Kimberly    Culture - Blood (collected 10-28-24 @ 19:10)  Source: .Blood BLOOD  Final Report (11-03-24 @ 01:19):    No growth at 5 days    Culture - Blood (collected 10-28-24 @ 19:05)  Source: .Blood BLOOD  Final Report (11-03-24 @ 01:19):    No growth at 5 days          Radiology: reviewed

## 2024-11-05 NOTE — PROGRESS NOTE ADULT - SUBJECTIVE AND OBJECTIVE BOX
DATE OF SERVICE: 11-05-24    Patient denies chest pain or shortness of breath.   Review of symptoms otherwise negative.    MEDICATIONS:  allopurinol 100 milliGRAM(s) Oral daily  chlorhexidine 2% Cloths 1 Application(s) Topical daily  furosemide    Tablet 80 milliGRAM(s) Oral every 12 hours  heparin   Injectable 4500 Unit(s) IV Push every 6 hours PRN  heparin   Injectable 2000 Unit(s) IV Push every 6 hours PRN  heparin  Infusion.  Unit(s)/Hr IV Continuous <Continuous>  hydroxyurea 500 milliGRAM(s) Oral daily  influenza  Vaccine (HIGH DOSE) 0.5 milliLiter(s) IntraMuscular once  metoprolol tartrate 25 milliGRAM(s) Oral two times a day                            9.1    27.02 )-----------( 736      ( 05 Nov 2024 05:47 )             31.1       136  |  93[L]  |  65[H]  ----------------------------<  59[L]  3.9   |  25  |  2.87[H]    Ca    8.8      05 Nov 2024 05:47  Phos  4.3     11-05  Mg     2.10     11-05    TPro  6.0  /  Alb  3.0[L]  /  TBili  1.3[H]  /  DBili  x   /  AST  39[H]  /  ALT  70[H]  /  AlkPhos  135[H]  11-04      T(C): 36.7 (11-05-24 @ 07:48), Max: 36.7 (11-05-24 @ 07:48)  HR: 90 (11-05-24 @ 07:48) (90 - 109)  BP: 104/47 (11-05-24 @ 07:48) (90/58 - 130/74)  RR: 16 (11-05-24 @ 07:48) (16 - 18)  SpO2: 98% (11-05-24 @ 07:48) (95% - 99%)  Wt(kg): --    I&O's Summary    04 Nov 2024 07:01  -  05 Nov 2024 07:00  --------------------------------------------------------  IN: 240 mL / OUT: 0 mL / NET: 240 mL      General: Well nourished in no acute distress. Alert and Oriented * 3.   Head: Normocephalic and atraumatic.   Neck: + JVD. No bruits. Supple. Does not appear to be enlarged.   Cardiovascular: + S1,S2 ; RRR Soft systolic murmur at the left lower sternal border. No rubs noted.    Lungs: CTA b/l. No rhonchi, rales or wheezes.   Abdomen: + BS, soft. Non tender. Non distended. No rebound. No guarding.   Extremities: No clubbing/cyanosis/1+ LE edema   Neurologic: Moves all four extremities. Full range of motion.   Skin: Warm and moist. The patient's skin has normal elasticity and good skin turgor.   Psychiatric: Appropriate mood and affect.  Musculoskeletal: Normal range of motion, normal strength    DATA    TELEMETRY: 	AF      ECG:  Afib with RVR	    < from: CT Chest No Cont (10.29.24 @ 09:53) >  IMPRESSION:  *  Bilateral pulmonary ground glass opacity consistent with edema versus   pneumonia.  *  Trace bilateral pleural effusions, ascites, and anasarca.  *  No evidence of acute infectious process in the abdomen and pelvis.  *  Bilateral renal cysts and right renal atrophy. No hydronephrosis.    < end of copied text >    < from: CT Head No Cont (10.29.24 @ 09:51) >  IMPRESSION:    No acute pathology.    < end of copied text >      < from: TTE W or WO Ultrasound Enhancing Agent (10.29.24 @ 13:17) >  CONCLUSIONS:      1. Left ventricular endocardium is not well visualized; however, the left ventricular systolic function appears grossly normal.   2. Enlarged right ventricular cavity size and reduced right ventricular systolic function.   3. There is calcification of the aortic valve leaflets. There is severe aortic stenosis. The peak transaortic velocity is 2.33 m/s, peak transaortic gradient is 21.8 mmHg and mean transaortic gradient is 13.3 mmHg with an LVOT/aortic valve VTI ratio of 0.39. The aortic valve area is estimated at 1.00 cm² by the continuity equation.   4. Structurally normal mitral valve with normal leaflet excursion. There is calcification of the mitral valve annulus. There is moderate to severe mitral regurgitation.   5. Structurally normal tricuspid valve with normal leaflet excursion. There is moderate to severe tricuspid regurgitation.   6. No pericardial effusion seen.   7. The inferior vena cava is dilated (dilated >2.1cm) with abnormal inspiratory collapse (abnormal <50%) consistent with elevated right atrial pressure (~15, range 10-20mmHg).   8. Technically difficult image quality.    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    #Rapid Afib  -- in the setting of multiple medical issues (volume overload, elevated WBC from baseline, EDA, transaminitis)  --cont Lopressor, uptitrate to 25 bid   -- Eliquis on hold given supratherapeutic INR, s/p Vit K, Heme following    #Volume Overload  -- pt with known valvular disease  -- TTE with severe AS, mod-sev MR, mod-sev TR  -- transitioned to oral lasix, decrease to 80MG daily  -- d/w Dr Guerin, prefer conservative medical management of valvular disease.  close OP F/U with Cardiologist Dr Chow    #EDA  -- Renal Following, no plans for HD at this time, EDA improving    Zara ROBERTS  [phone]

## 2024-11-05 NOTE — PROGRESS NOTE ADULT - ASSESSMENT
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  CT abd/pelvis- no evidence of acute infectious process. right renal cyst contains mildly thick septations  abd US- 1.9 cm gallstone. Mild gallbladder wall thickening. No pericholecystic fluid. equivocal for cholecystitis.  s/p surgery eval- low suspicion for cholecystitis, not a surgical candidate  MRSA PCR negative  S/p zosyn x5 day course completed 11/2    CHF, elevated liver enzymes  c/f congestive hepatopathy  b/l LE edema  pleural effusions, ascites, anasarca on CT  TTE- severe aortic stenosis, moderate to severe MR & TR    ET/MPN  leukocytosis likely significantly in part 2/2 ET/MPN  LDH, uric acid elevated  - rasburicase given, on allopurinol    EDA  nephrology following    Recommendations  Monitor off ABx  Trend temps/WBC -- leukocytosis elevated, but downtrending  heme/onc following  cardiology following  additional care per primary team    Infectious Diseases will follow. Please call with any questions.  Ramona Valle M.D.  [facility] Division of Infectious Diseases [phone]  For after 5 P.M. and weekends, please call [phone]  Available on [employer] TEAMS

## 2024-11-06 ENCOUNTER — TRANSCRIPTION ENCOUNTER (OUTPATIENT)
Age: 84
End: 2024-11-06

## 2024-11-06 LAB
ANION GAP SERPL CALC-SCNC: 15 MMOL/L — HIGH (ref 7–14)
APTT BLD: 34.6 SEC — SIGNIFICANT CHANGE UP (ref 24.5–35.6)
BUN SERPL-MCNC: 62 MG/DL — HIGH (ref 7–23)
CALCIUM SERPL-MCNC: 8.9 MG/DL — SIGNIFICANT CHANGE UP (ref 8.4–10.5)
CHLORIDE SERPL-SCNC: 93 MMOL/L — LOW (ref 98–107)
CO2 SERPL-SCNC: 26 MMOL/L — SIGNIFICANT CHANGE UP (ref 22–31)
CREAT SERPL-MCNC: 2.58 MG/DL — HIGH (ref 0.5–1.3)
EGFR: 18 ML/MIN/1.73M2 — LOW
GLUCOSE BLDC GLUCOMTR-MCNC: 168 MG/DL — HIGH (ref 70–99)
GLUCOSE SERPL-MCNC: 45 MG/DL — CRITICAL LOW (ref 70–99)
HCT VFR BLD CALC: 30.9 % — LOW (ref 34.5–45)
HGB BLD-MCNC: 9.2 G/DL — LOW (ref 11.5–15.5)
MAGNESIUM SERPL-MCNC: 2.2 MG/DL — SIGNIFICANT CHANGE UP (ref 1.6–2.6)
MCHC RBC-ENTMCNC: 24 PG — LOW (ref 27–34)
MCHC RBC-ENTMCNC: 29.8 G/DL — LOW (ref 32–36)
MCV RBC AUTO: 80.7 FL — SIGNIFICANT CHANGE UP (ref 80–100)
NRBC # BLD: 0 /100 WBCS — SIGNIFICANT CHANGE UP (ref 0–0)
NRBC # FLD: 0.03 K/UL — HIGH (ref 0–0)
PHOSPHATE SERPL-MCNC: 5.1 MG/DL — HIGH (ref 2.5–4.5)
PLATELET # BLD AUTO: 768 K/UL — HIGH (ref 150–400)
POTASSIUM SERPL-MCNC: 5 MMOL/L — SIGNIFICANT CHANGE UP (ref 3.5–5.3)
POTASSIUM SERPL-SCNC: 5 MMOL/L — SIGNIFICANT CHANGE UP (ref 3.5–5.3)
RBC # BLD: 3.83 M/UL — SIGNIFICANT CHANGE UP (ref 3.8–5.2)
RBC # FLD: 26.2 % — HIGH (ref 10.3–14.5)
SODIUM SERPL-SCNC: 134 MMOL/L — LOW (ref 135–145)
WBC # BLD: 25.63 K/UL — HIGH (ref 3.8–10.5)
WBC # FLD AUTO: 25.63 K/UL — HIGH (ref 3.8–10.5)

## 2024-11-06 PROCEDURE — 99232 SBSQ HOSP IP/OBS MODERATE 35: CPT

## 2024-11-06 RX ORDER — METOPROLOL TARTRATE 50 MG
50 TABLET ORAL
Refills: 0 | Status: DISCONTINUED | OUTPATIENT
Start: 2024-11-06 | End: 2024-11-07

## 2024-11-06 RX ADMIN — CHLORHEXIDINE GLUCONATE 1 APPLICATION(S): 40 SOLUTION TOPICAL at 12:12

## 2024-11-06 RX ADMIN — Medication 50 MILLIGRAM(S): at 17:38

## 2024-11-06 RX ADMIN — Medication 80 MILLIGRAM(S): at 05:02

## 2024-11-06 RX ADMIN — Medication 25 MILLIGRAM(S): at 05:02

## 2024-11-06 RX ADMIN — Medication 500 MILLIGRAM(S): at 12:12

## 2024-11-06 RX ADMIN — Medication 100 MILLIGRAM(S): at 12:12

## 2024-11-06 RX ADMIN — APIXABAN 2.5 MILLIGRAM(S): 5 TABLET, FILM COATED ORAL at 17:37

## 2024-11-06 NOTE — PROGRESS NOTE ADULT - SUBJECTIVE AND OBJECTIVE BOX
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  Collateral obtained from son and daughter in law who is her PCP. She has been unwell for the past week, lives above them on the second floor and at baseline is independent and ambulatory, but has been in bed and with poor PO intake over the past week. Per son had significant leg edema preventing her from walking, also w/ poor PO intake. Appeared to have shortness of breath. Symptoms started last Monday.  In the ED was in afib w/ RVR. Labs significant for WBC 49, tbili 2.0, alk phos 219, AST 1198, , Cr. 4.3 (baseline mid 2s), metabolic acidosis with lactate elevation to 5, VBG 7.23/37/15/51, proBNP 35k, Trop 78 --> 69, Ca 8.2. Abdominal US with gallbladder wall thickening.   S/p zosyn, lasix 40 mg IVP x2, metoprolol 25mg, lopressor IV 5mg x3, sodium bicarb 50 IVP, diltiazem 30mg.     AFib was rapid on arrival, now rate-controlled after IV AVN blockers.    Date of service 11/6- resting in bed, no overnight issues. NO SOB or pain, ROS limited      PAST MEDICAL & SURGICAL HISTORY:  Thrombocytosis  Hypertension  Hyperlipidemia  Afib  CHF (congestive heart failure)  No significant past surgical history        allopurinol 100 milliGRAM(s) Oral daily  apixaban 2.5 milliGRAM(s) Oral two times a day  chlorhexidine 2% Cloths 1 Application(s) Topical daily  furosemide    Tablet 80 milliGRAM(s) Oral daily  hydroxyurea 500 milliGRAM(s) Oral daily  influenza  Vaccine (HIGH DOSE) 0.5 milliLiter(s) IntraMuscular once  metoprolol tartrate 25 milliGRAM(s) Oral two times a day                            9.2    25.63 )-----------( 768      ( 06 Nov 2024 04:55 )             30.9       11-06    134[L]  |  93[L]  |  62[H]  ----------------------------<  45[LL]  5.0   |  26  |  2.58[H]    Ca    8.9      06 Nov 2024 04:55  Phos  5.1     11-06  Mg     2.20     11-06      T(C): 36.7 (11-06-24 @ 05:00), Max: 37.1 (11-05-24 @ 15:53)  HR: 96 (11-06-24 @ 05:00) (82 - 117)  BP: 118/64 (11-06-24 @ 05:00) (99/54 - 118/64)  RR: 18 (11-06-24 @ 05:00) (18 - 20)  SpO2: 98% (11-06-24 @ 05:00) (98% - 100%)  Wt(kg): --    Appearance: Normal size elderly woman, somnolent after sedation.	  HEENT:   Normal oral mucosa, PERRL, EOMI	  Lymphatic: No lymphadenopathy , + edema in BL feet  Cardiovascular: irregular S1 S2, No JVD, No murmurs , Peripheral pulses palpable 2+ bilaterally  Respiratory: Lungs clear to auscultation, normal effort 	  Gastrointestinal:  Soft, Non-tender, + BS	  Skin: No rashes, No ecchymoses, No cyanosis, warm to touch  Musculoskeletal: moves all four extremities  Psychiatry:  Mood & affect appropriate    TELEMETRY: AF RVR --> rate controlled	    ECG: AF / RVR    ASSESSMENT/PLAN: Ms Guerin is an 84y Female brought in from home with altered mental status.    Can increase Metoprolol to 50 BID  Dose of apixaban on discharge would be 2.5mg BID, as she is >80yo and weight is <60kg.  No invasive EP procedures planned.

## 2024-11-06 NOTE — PROGRESS NOTE ADULT - ASSESSMENT
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

## 2024-11-06 NOTE — PROGRESS NOTE ADULT - PROBLEM SELECTOR PLAN 4
-C/f CHF exacerbation given dyspnea and edema, with liver and renal failure c/f cardiorenal syndrome  -CXR appears negative for pulmonary edema or consolidations but did have crackles on exam and pitting edema; not hypoxic   -TTE showed left ventricular systolic function appears grossly normal.  severe aortic stenosis. Moderate to severe mitral regurgitation. There is moderate to severe tricuspid regurgitation.  Plan:  - cards recs appreciated  - switched to PO lasix IV 80 mg BID  - Discussed with cards attending on 10/31, cards discussed with family about TAVR, to be f/u as outpt
-C/f CHF exacerbation given dyspnea and edema, with liver and renal failure c/f hepatorenal or cardiorenal from congestion  -CXR appears negative for pulmonary edema or consolidations but does have crackles on exam and pitting edema; not hypoxic   -S/p lasix 40mg x2  -Cont lasix IV 80 mg BID   [] TTE showed left ventricular systolic function appears grossly normal.  severe aortic stenosis. Moderate to severe mitral regurgitation. There is moderate to severe tricuspid regurgitation.  -Discussed with cards attending on 10/31, cards discussed with family about TAVR, to be f/u as outpt  -LLE doppler neg for dvt
-C/f CHF exacerbation given dyspnea and edema, with liver and renal failure c/f hepatorenal or cardiorenal from congestion  -CXR appears negative for pulmonary edema or consolidations but does have crackles on exam and pitting edema; not hypoxic   -S/p lasix 40mg x2  -Cont lasix IV 80 mg BID   [] TTE showed left ventricular systolic function appears grossly normal.  severe aortic stenosis. Moderate to severe mitral regurgitation. There is moderate to severe tricuspid regurgitation.  -LLE doppler neg for dvt
-C/f CHF exacerbation given dyspnea and edema, with liver and renal failure c/f cardiorenal syndrome  -CXR appears negative for pulmonary edema or consolidations but did have crackles on exam and pitting edema; not hypoxic   -TTE showed left ventricular systolic function appears grossly normal.  severe aortic stenosis. Moderate to severe mitral regurgitation. There is moderate to severe tricuspid regurgitation.  Plan:  - cards recs appreciated  - switched to PO lasix IV 80 mg BID  - Discussed with cards attending on 10/31, cards discussed with family about TAVR, to be f/u as outpt
-C/f CHF exacerbation given dyspnea and edema, with liver and renal failure c/f hepatorenal or cardiorenal from congestion  -CXR appears negative for pulmonary edema or consolidations but does have crackles on exam and pitting edema; not hypoxic   -S/p lasix 40mg x2  -Cont lasix IV 80 mg BID   [] TTE showed left ventricular systolic function appears grossly normal.  severe aortic stenosis. Moderate to severe mitral regurgitation. There is moderate to severe tricuspid regurgitation.  -Discussed with cards attending on 10/31, cards discussed with family about TAVR, to be f/u as outpt  -LLE doppler neg for dvt
-C/f CHF exacerbation given dyspnea and edema, with liver and renal failure c/f hepatorenal or cardiorenal from congestion  -CXR appears negative for pulmonary edema or consolidations but does have crackles on exam and pitting edema; not hypoxic   -S/p lasix 40mg x2  -Increased lasix IV to 80 mg BID   [] TTE pending  -LLE doppler neg for dvt
-C/f CHF exacerbation given dyspnea and edema, with liver and renal failure c/f cardiorenal syndrome  -CXR appears negative for pulmonary edema or consolidations but did have crackles on exam and pitting edema; not hypoxic   -TTE showed left ventricular systolic function appears grossly normal.  severe aortic stenosis. Moderate to severe mitral regurgitation. There is moderate to severe tricuspid regurgitation.  Plan:  - cards recs appreciated  - switched to PO lasix IV 80 mg BID  - Discussed with cards attending on 10/31, cards discussed with family about TAVR, to be f/u as outpt
-C/f CHF exacerbation given dyspnea and edema, with liver and renal failure c/f hepatorenal or cardiorenal from congestion  -CXR appears negative for pulmonary edema or consolidations but does have crackles on exam and pitting edema; not hypoxic   -S/p lasix 40mg x2  -Cont lasix IV 80 mg BID   [] TTE showed left ventricular systolic function appears grossly normal.  severe aortic stenosis. Moderate to severe mitral regurgitation. There is moderate to severe tricuspid regurgitation.  -Discussed with cards attending, cards to discuss with family about TAVR  -LLE doppler neg for dvt
-C/f CHF exacerbation given dyspnea and edema, with liver and renal failure c/f hepatorenal or cardiorenal from congestion  -CXR appears negative for pulmonary edema or consolidations but does have crackles on exam and pitting edema; not hypoxic   -S/p lasix 40mg x2  -Cont lasix IV 80 mg BID per cards to switch to po lasix tomorrow  -TTE showed left ventricular systolic function appears grossly normal.  severe aortic stenosis. Moderate to severe mitral regurgitation. There is moderate to severe tricuspid regurgitation.  -Discussed with cards attending on 10/31, cards discussed with family about TAVR, to be f/u as outpt  -LLE doppler neg for dvt

## 2024-11-06 NOTE — PROGRESS NOTE ADULT - PROBLEM SELECTOR PLAN 6
-i/s/o EDA   -Improving after bicarb 50 IVP  -start on bicarb per renal recs
-i/s/o EDA   -Improved  -dc bicarb per renal recs
-i/s/o EDA   -Improved  -dc'ed bicarb per renal recs
-i/s/o EDA   -Improved  -dc bicarb per renal recs
-i/s/o EDA   -Improved  -dc bicarb per renal recs
-i/s/o EDA   -Improved  -dc'ed bicarb per renal recs
-i/s/o EDA   -Improved  -dc'ed bicarb per renal recs

## 2024-11-06 NOTE — DISCHARGE NOTE NURSING/CASE MANAGEMENT/SOCIAL WORK - NSDPFAC_GEN_ALL_CORE
[facility] [place] Phone: [phone]  	[address] [facility]. [place] [id number]

## 2024-11-06 NOTE — DISCHARGE NOTE NURSING/CASE MANAGEMENT/SOCIAL WORK - NSDCFUADDAPPT_GEN_ALL_CORE_FT
APPTS ARE READY TO BE MADE: [X] YES    Best Family or Patient Contact (if needed): Leno Guerin (son) [phone]    Additional Information about above appointments (if needed):    1: Dr. Waller within 2 weeks  2:   3:     Other comments or requests:

## 2024-11-06 NOTE — PROGRESS NOTE ADULT - PROBLEM SELECTOR PLAN 3
Elevated lfts, INR of 10.6 on 10/29, now improved to 1.31 today, no reports of any bleeding, vit k x3 days  Heme candice discussed with heme fellow on 10/29 per fellow will give vit k now, hod off on ffp  improving
Elevated lfts, INR of 10.6 on 10/29, now improved to 1.82 today, no reports of any bleeding, vit k x3 days  Lala harvey discussed with heme fellow on 10/29 per fellow will give vit k now, hod off on ffp  improving  f/u today's inr
Elevated lfts, INR of 10.6, no reports of any bleeding, vit k x3 days  Heme follg discussed with heme fellow on 10/29 per fellow will give vit k now, hold off on ffp or kcentra, as pt with no overt bleeding, f/u full heme recs
Elevated lfts, INR of 10.6 on 10/29, now improved after vit k x3 days  - monitor
Elevated lfts, INR of 10.6 on 10/29, now improved to 1.82 today, no reports of any bleeding, vit k x3 days  Heme candice discussed with heme fellow on 10/29 per fellow will give vit k now, hod off on ffp  improving
Elevated lfts, INR of 10.6 on 10/29, now improved to 1.1 today, no reports of any bleeding, vit k x3 days  Lala harvey discussed with heme fellow on 10/29 per fellow will give vit k now, hod off on ffp  improving
Elevated lfts, INR of 10.6 on 10/29, now improving to 3, no reports of any bleeding, vit k x3 days  Heme follg discussed with heme fellow on 10/29 per fellow will give vit k now, hod off on ffp  improving

## 2024-11-06 NOTE — PROGRESS NOTE ADULT - PROBLEM SELECTOR PLAN 7
-Significant elevation in AST/ALT 1198/603, prior normal 2 weeks PTA  -likely 2/2 congestive hepatopathy as improved with diuresis  -Per chart review patient was on a statin that she stopped taking because of myalgias  -Also w/ gallbladder wall edema c/f cholecystitis on US, but exam is negative  -- pattern of liver injury is not cholestatic either  -Surgery recs appreciated. no acute intervention at this time but recommend HIDA if continued concern for tomy. low concern at this time.  lfts improved
-Significant elevation in AST/ALT 1198/603, prior normal 2 weeks PTA  -likely 2/2 congestive hepatopathy as improved with diuresis  -Per chart review patient was on a statin that she stopped taking because of myalgias  -Also w/ gallbladder wall edema c/f cholecystitis on US, but exam is negative  -- pattern of liver injury is not cholestatic either  -Surgery recs appreciated. no acute intervention at this time but recommend HIDA if continued concern for tomy. low concern at this time.  lfts improved
-Significant elevation in AST/ALT 1198/603, prior normal 2 weeks ago  -Ddx infection vs. hepatorenal vs. toxin  -Per chart review patient was on a statin that she stopped taking because of myalgias  -Also w/ gallbladder wall edema c/f cholecystitis on US, but exam is negative  -- pattern of liver injury is not cholestatic either  -Surgery following, no acute intervention at this time but recommend HIDA if continued concern for tomy  lfts improving
-Significant elevation in AST/ALT 1198/603, prior normal 2 weeks PTA  -likely 2/2 congestive hepatopathy as improved with diuresis  -Per chart review patient was on a statin that she stopped taking because of myalgias  -Also w/ gallbladder wall edema c/f cholecystitis on US, but exam is negative  -- pattern of liver injury is not cholestatic either  -Surgery recs appreciated. no acute intervention at this time but recommend HIDA if continued concern for tomy. low concern at this time.  lfts improved
-Significant elevation in AST/ALT 1198/603, prior normal 2 weeks ago  -Ddx infection vs. hepatorenal vs. toxin  -Per chart review patient was on a statin that she stopped taking because of myalgias  -Also w/ gallbladder wall edema c/f cholecystitis on US, but exam is negative  -- pattern of liver injury is not cholestatic either  -Surgery following, no acute intervention at this time but recommend HIDA if continued concern for tomy  lfts improving
-Significant elevation in AST/ALT 1198/603, prior normal 2 weeks ago  -Ddx infection vs. hepatorenal vs. toxin  -Per chart review patient was on a statin that she stopped taking because of myalgias  -Also w/ gallbladder wall edema c/f cholecystitis on US, but exam is negative  -- pattern of liver injury is not cholestatic either  -Surgery following, no acute intervention at this time but recommend HIDA if continued concern for tomy

## 2024-11-06 NOTE — PROGRESS NOTE ADULT - PROBLEM SELECTOR PROBLEM 1
Acute kidney injury superimposed on CKD
AMS (altered mental status)
Acute kidney injury superimposed on CKD
AMS (altered mental status)
Acute kidney injury superimposed on CKD
AMS (altered mental status)

## 2024-11-06 NOTE — DISCHARGE NOTE NURSING/CASE MANAGEMENT/SOCIAL WORK - NSDCCRNAME_GEN_ALL_CORE_FT
[facility] [place] Phone: [phone]  	[address] [facility] ([place] [place] [id number]

## 2024-11-06 NOTE — PROGRESS NOTE ADULT - SUBJECTIVE AND OBJECTIVE BOX
[facility] Division of Kidney Diseases & Hypertension  FOLLOW UP NOTE  [phone]--------------------------------------------------------------------------------    Chief Complaint: EDA on CKD, metabolic acidosis    24 hour events/subjective: Pt. was seen and evaluated at bedside earlier today. Pt. reports feeling well, offers no complaints. Denies any headaches, fevers/chills, chest pain, and abdominal pain.      PAST HISTORY  --------------------------------------------------------------------------------  No significant changes to PMH, PSH, FHx, SHx, unless otherwise noted    ALLERGIES & MEDICATIONS  --------------------------------------------------------------------------------  Allergies    No Known Drug Allergies  eggs (Unknown)    Intolerances      Standing Inpatient Medications  allopurinol 100 milliGRAM(s) Oral daily  apixaban 2.5 milliGRAM(s) Oral two times a day  chlorhexidine 2% Cloths 1 Application(s) Topical daily  furosemide    Tablet 80 milliGRAM(s) Oral daily  hydroxyurea 500 milliGRAM(s) Oral daily  influenza  Vaccine (HIGH DOSE) 0.5 milliLiter(s) IntraMuscular once  metoprolol tartrate 25 milliGRAM(s) Oral two times a day    PRN Inpatient Medications      REVIEW OF SYSTEMS  --------------------------------------------------------------------------------  Gen: No fevers/chills  Respiratory: No dyspnea  CV: No chest pain  GI: No abdominal pain  MSK: No edema  Neuro: No dizziness/lightheadedness    All other systems were reviewed and are negative, except as noted.     VITALS/PHYSICAL EXAM  --------------------------------------------------------------------------------  T(C): 36.7 (11-06-24 @ 05:00), Max: 37.1 (11-05-24 @ 15:53)  HR: 96 (11-06-24 @ 05:00) (82 - 117)  BP: 118/64 (11-06-24 @ 05:00) (99/54 - 118/64)  RR: 18 (11-06-24 @ 05:00) (18 - 20)  SpO2: 98% (11-06-24 @ 05:00) (98% - 100%)  Wt(kg): --    Physical Exam:  Gen: Resting, in NAD  HEENT: Anicteric  Pulm: CTABL  CV: S1S2+  Abd: Soft, +BS    Ext: +Mild LE edema B/L (L>R), improving  Neuro: Awake  Skin: Warm and dry    LABS/STUDIES  --------------------------------------------------------------------------------              9.2    25.63 >-----------<  768      [11-06-24 @ 04:55]              30.9     134  |  93  |  62  ----------------------------<  45      [11-06-24 @ 04:55]  5.0   |  26  |  2.58        Ca     8.9     [11-06-24 @ 04:55]      Mg     2.20     [11-06-24 @ 04:55]      Phos  5.1     [11-06-24 @ 04:55]      PTT: 34.6       [11-06-24 @ 04:55]    Creatinine Trend:  SCr 2.58 [11-06 @ 04:55]  SCr 2.87 [11-05 @ 05:47]  SCr 3.34 [11-04 @ 06:00]  SCr 3.12 [11-03 @ 07:10]  SCr 3.25 [11-02 @ 05:53]

## 2024-11-06 NOTE — DISCHARGE NOTE NURSING/CASE MANAGEMENT/SOCIAL WORK - PATIENT PORTAL LINK FT
You can access the FollowMyHealth Patient Portal offered by [facility] by registering at the following website: [email or web address]. By joining [employer]’s FollowMyHealth portal, you will also be able to view your health information using other applications (apps) compatible with our system.

## 2024-11-06 NOTE — PROGRESS NOTE ADULT - ASSESSMENT
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  CT abd/pelvis- no evidence of acute infectious process. right renal cyst contains mildly thick septations  abd US- 1.9 cm gallstone. Mild gallbladder wall thickening. No pericholecystic fluid. equivocal for cholecystitis.  s/p surgery eval- low suspicion for cholecystitis, not a surgical candidate  MRSA PCR negative  S/p zosyn x5 day course completed 11/2    CHF, elevated liver enzymes  c/f congestive hepatopathy  b/l LE edema  pleural effusions, ascites, anasarca on CT  TTE- severe aortic stenosis, moderate to severe MR & TR    ET/MPN  leukocytosis likely significantly in part 2/2 ET/MPN  LDH, uric acid elevated  - rasburicase given, on allopurinol    EDA  nephrology following    Recommendations  Monitor off ABx  Trend temps/WBC -- leukocytosis elevated, but downtrending  heme/onc following  cardiology following  additional care per primary team    Dr. Kirk to resume care 11/7  Infectious Diseases will follow. Please call with any questions.  Ramona Valle M.D.  [facility] Division of Infectious Diseases [phone]  For after 5 P.M. and weekends, please call [phone]  Available on [employer] TEAMS

## 2024-11-06 NOTE — DISCHARGE NOTE NURSING/CASE MANAGEMENT/SOCIAL WORK - FINANCIAL ASSISTANCE
[facility] provides services at a reduced cost to those who are determined to be eligible through [facility]’s financial assistance program. Information regarding [facility]’s financial assistance program can be found by going to [email or web address] or by calling [phone].

## 2024-11-06 NOTE — PROGRESS NOTE ADULT - PROBLEM SELECTOR PROBLEM 2
Acute kidney injury superimposed on CKD
Metabolic acidosis
Acute kidney injury superimposed on CKD
Metabolic acidosis
Acute kidney injury superimposed on CKD
Metabolic acidosis
Metabolic acidosis
Acute kidney injury superimposed on CKD

## 2024-11-06 NOTE — DISCHARGE NOTE NURSING/CASE MANAGEMENT/SOCIAL WORK - NSDCPEFALRISK_GEN_ALL_CORE
For information on Fall & Injury Prevention, visit: [email or web address] OR  [email or web address] OR  https://www.cdc.gov/steadi/patient.html

## 2024-11-06 NOTE — PROGRESS NOTE ADULT - PROBLEM SELECTOR PLAN 1
-Ddx uremia from EDA vs. toxic metabolic encephalopathy from sepsis vs. hepatic encephalopathy   -Per family patient's baseline is independent and she is usually conversational and expresses her needs. Here she is only responding yes/no to some questions and not following commands, per family this is not her baseline   -CTH showed No acute pathology.  -ammonia wnl
Pt. with EDA on CKD in the setting of fluid overload, ARB use, and decreased PO intake. On review of [place]/[facility], SCr noted to be elevated since July 2022. SCr was 2.43 on 10/14/24. SCr initially during this admission increased to 4.33 (10/28). Pt. received IV Lasix and IVFs. SCr remains elevated but decreased to 2.58 today (11/6). UOP is not documented. UA showed proteinuria, hematuria (likely in setting of urinary catheter placement), and trace ketonuria. CT abdomen showed atrophic R kidney but no evidence of hydronephrosis BL. Labs reviewed. No urgent indications for HD currently. Cardiology note reviewed, agree with PO Lasix. Monitor labs and urine output. Avoid nephrotoxins. Dose medications as per eGFR.
Pt. with EDA on CKD in the setting of fluid overload, ARB use, and decreased PO intake. On review of [place]/[facility], SCr noted to be elevated since July 2022. SCr was 2.43 on 10/14/24. SCr initially during this admission increased to 4.33 (10/28). Pt. received IV Lasix and IVFs. SCr remains elevated but decreased to 2.87 today (11/5). UOP is not documented. UA showed proteinuria, hematuria (likely in setting of urinary catheter placement), and trace ketonuria. CT abdomen showed atrophic R kidney but no evidence of hydronephrosis BL. Labs reviewed. No urgent indications for HD currently. Cardiology note reviewed, agree with PO Lasix. Monitor labs and urine output. Avoid nephrotoxins. Dose medications as per eGFR.
Pt. with EDA on CKD in the setting of fluid overload, ARB use, and decreased PO intake. On review of [place]/[facility], SCr noted to be elevated since July 2022. SCr was 2.43 on 10/14/24. SCr initially during this admission increased to 4.33 (10/28). Pt. received IV Lasix and IVFs. SCr remains elevated but improved to 3.12 today (11/3). Documented urine output is 800 cc over the past 24 hours. UA showed proteinuria, hematuria (likely in setting of urinary catheter placement), and trace ketonuria. CT abdomen showed atrophic R kidney but no evidence of hydronephrosis BL. Labs reviewed. Electrolytes are in acceptable range. Diuresis as per cardiology. Monitor labs and urine output. Avoid nephrotoxins. Dose medications as per eGFR.
-Ddx uremia from EDA vs. toxic metabolic encephalopathy from sepsis vs. hepatic encephalopathy   -Per family patient's baseline is independent and she is usually conversational and expresses her needs. Here she is only responding yes/no to some questions and not following commands, per family this is not her baseline   -CTH showed No acute pathology.  -ammonia wnl  -mental status much improved
Pt. with EDA on CKD in the setting of fluid overload, ARB use, and decreased PO intake. On review of [place]/[facility], SCr noted to be elevated since July 2022. SCr was 2.43 on 10/14/24. SCr initially during this admission increased to 4.33 (10/28). Pt. received IV Lasix and IVFs. SCr remains elevated but decreased to 3.94 today (10/30). Pt. is non-oliguric, documented urine output is 1.5 L over the past 24 hours. UA showed proteinuria, hematuria (likely in setting of urinary catheter placement), and trace ketonuria. CT abdomen showed atrophic R kidney but no evidence of hydronephrosis BL. Labs reviewed. No urgent indications for HD currently but will continue to monitor. Discontinue IVFs. Agree with IV diuresis. Monitor labs and urine output. Avoid nephrotoxins. Dose medications as per eGFR.
Pt. with EDA on CKD in the setting of fluid overload, ARB use, and decreased PO intake. On review of [place]/[facility], SCr noted to be elevated since July 2022. SCr was 2.43 on 10/14/24. SCr initially during this admission increased to 4.33 (10/28). Pt. received IV Lasix and IVFs. SCr remains elevated/stable at 3.74 today (10/31). Documented urine output is ?500 cc over the past 24 hours. UA showed proteinuria, hematuria (likely in setting of urinary catheter placement), and trace ketonuria. CT abdomen showed atrophic R kidney but no evidence of hydronephrosis BL. Labs reviewed. No urgent indications for HD currently but will continue to monitor. Cardiology note reviewed, agree with IV diuresis. Monitor labs and urine output. Avoid nephrotoxins. Dose medications as per eGFR.
Pt. with EDA on CKD in the setting of fluid overload, ARB use, and decreased PO intake. On review of [place]/[facility], SCr noted to be elevated since July 2022. SCr was 2.43 on 10/14/24. SCr initially during this admission increased to 4.33 (10/28). Pt. received IV Lasix and IVFs. SCr remains elevated/stable at 3.34 today (11/4). Documented urine output is 600 cc over the past 24 hours. UA showed proteinuria, hematuria (likely in setting of urinary catheter placement), and trace ketonuria. CT abdomen showed atrophic R kidney but no evidence of hydronephrosis BL. Labs reviewed. No urgent indications for HD currently but will continue to monitor. Cardiology note reviewed, agree with transition to PO Lasix. Monitor labs and urine output. Avoid nephrotoxins. Dose medications as per eGFR.
RESOLVED  -metabolic encephalopathy on admission 2/2 likely combination of uremia from EDA, sespis, hepatic encephalopathy  - on admission, pt was only responding yes/no to some questions and not following commands  -CTH showed No acute pathology.  -ammonia wnl  -mental status much improved. now appears to be back to baseline AAOx4, responding to questions and following commands appropriately
-Ddx uremia from EDA vs. toxic metabolic encephalopathy from sepsis vs. hepatic encephalopathy   -Per family patient's baseline is independent and she is usually conversational and expresses her needs. Here she is only responding yes/no to some questions and not following commands, per family this is not her baseline   -CTH showed No acute pathology.  -ammonia wnl  -mental status much improved
Pt. with EDA on CKD in the setting of fluid overload, ARB use, and decreased PO intake. On review of [place]/[facility], SCr noted to be elevated since July 2022. SCr was 2.43 on 10/14/24. SCr initially during this admission increased to 4.33 (10/28). Pt. received IV Lasix and IVFs. SCr remains elevated but decreased to 3.58 today (11/1). Documented urine output is 700 cc over the past 24 hours. UA showed proteinuria, hematuria (likely in setting of urinary catheter placement), and trace ketonuria. CT abdomen showed atrophic R kidney but no evidence of hydronephrosis BL. Labs reviewed. No urgent indications for HD currently but will continue to monitor. Cardiology note reviewed, agree with IV diuresis. Monitor labs and urine output. Avoid nephrotoxins. Dose medications as per eGFR.
RESOLVED  -metabolic encephalopathy on admission 2/2 likely combination of uremia from EDA, sespis, hepatic encephalopathy  - on admission, pt was only responding yes/no to some questions and not following commands  -CTH showed No acute pathology.  -ammonia wnl  -mental status much improved. now appears to be back to baseline AAOx4, responding to questions and following commands appropriately
-Ddx uremia from EDA vs. toxic metabolic encephalopathy from sepsis vs. hepatic encephalopathy   -Per family patient's baseline is independent and she is usually conversational and expresses her needs. Here she is only responding yes/no to some questions and not following commands, per family this is not her baseline   -CTH showed No acute pathology.  -ammonia wnl  -mental status much improved
-Ddx uremia from EDA vs. toxic metabolic encephalopathy from sepsis vs. hepatic encephalopathy   -Per family patient's baseline is independent and she is usually conversational and expresses her needs. Here she is only responding yes/no to some questions and not following commands, per family this is not her baseline   -CTH showed No acute pathology.    [] f/u ammonia
-Ddx uremia from EDA vs. toxic metabolic encephalopathy from sepsis vs. hepatic encephalopathy   -Per family patient's baseline is independent and she is usually conversational and expresses her needs. Here she is only responding yes/no to some questions and not following commands, per family this is not her baseline   -CTH showed No acute pathology.  -ammonia wnl  -mental status much improved
RESOLVED  -metabolic encephalopathy on admission 2/2 likely combination of uremia from EDA, sespis, hepatic encephalopathy  - on admission, pt was only responding yes/no to some questions and not following commands  -CTH showed No acute pathology.  -ammonia wnl  -mental status much improved. now appears to be back to baseline AAOx4, responding to questions and following commands appropriately

## 2024-11-06 NOTE — PROGRESS NOTE ADULT - TIME BILLING
Time-based billing (NON-critical care).     More than 50% of the visit was spent counseling and / or coordinating care by the attending physician.      The necessity of the time spent during the encounter on this date of service was due to: documentation in [place], reviewing chart and coordinating care with patient/ACPs and interdisciplinary staff (such as , social workers, etc) as well as reviewing vitals, laboratory data, radiology, medication list, consultants' recommendations and prior records. Interventions were performed as documented above.
Time-based billing (NON-critical care).     More than 50% of the visit was spent counseling and / or coordinating care by the attending physician.      The necessity of the time spent during the encounter on this date of service was due to: documentation in [place], reviewing chart and coordinating care with patient/ACPs and interdisciplinary staff (such as , social workers, etc) as well as reviewing vitals, laboratory data, radiology, medication list, consultants' recommendations and prior records. Interventions were performed as documented above.
Time-based billing (NON-critical care).     More than 50% of the visit was spent counseling and / or coordinating care by the attending physician.      The necessity of the time spent during the encounter on this date of service was due to: documentation in [place], reviewing chart and coordinating care with patient/ACPs and interdisciplinary staff (such as , social workers, etc) as well as reviewing vitals, laboratory data, radiology, medication list, consultants' recommendations and prior records. Interventions were performed as documented above.

## 2024-11-06 NOTE — PROGRESS NOTE ADULT - PROBLEM SELECTOR PLAN 5
-Afib w/ RVR in the ED, requiring multiple doses of lopressor, diltiazem, metoprolol  -EP recs appreciated  Plan:  - increased lopressor to 25mg BID   - eliquis 2.5mg BID, as she is >78yo and weight is <60kg
-Afib w/ RVR in the ED, requiring multiple doses of lopressor, diltiazem, metoprolol  -EP recs appreciated  Plan:  - increased lopressor to 25mg BID   -on heparin drip  -Dose of apixaban on discharge would be 2.5mg BID, as she is >80yo and weight is <60kg.  No invasive EP procedures planned.
-Afib w/ RVR in the ED, requiring multiple doses of lopressor, diltiazem, metoprolol  -EP recs appreciated  Plan:  - increased lopressor to 50mg BID   - eliquis 2.5mg BID, as she is >80yo and weight is <60kg
-Afib w/ RVR in the ED, requiring multiple doses of lopressor, diltiazem, metoprolol  -EP follg, will give metoprolol low dose as bp tolerates,  -on heparin drip
-Afib w/ RVR in the ED, requiring multiple doses of lopressor, diltiazem, metoprolol  -EP follg, low dose metoprolol  -on heparin drip  -per EP- Dose of apixaban on discharge would be 2.5mg BID, as she is >80yo and weight is <60kg.  No invasive EP procedures planned.
-Afib w/ RVR in the ED, requiring multiple doses of lopressor, diltiazem, metoprolol  -EP follg, will give metoprolol low dose as bp tolerates, hold ac for now given drop in hb and coagulopathy
-Afib w/ RVR in the ED, requiring multiple doses of lopressor, diltiazem, metoprolol  -EP follg, will give metoprolol low dose as bp tolerates,  will start on heparin drip
-Afib w/ RVR in the ED, requiring multiple doses of lopressor, diltiazem, metoprolol  -EP follg, will give metoprolol low dose as bp tolerates,
-Afib w/ RVR in the ED, requiring multiple doses of lopressor, diltiazem, metoprolol  -EP follg, will give metoprolol low dose as bp tolerates,  -on heparin drip

## 2024-11-06 NOTE — PROGRESS NOTE ADULT - PROBLEM SELECTOR PROBLEM 6
Metabolic acidosis

## 2024-11-06 NOTE — PROGRESS NOTE ADULT - SUBJECTIVE AND OBJECTIVE BOX
DATE OF SERVICE: 11-06-24    Patient denies chest pain or shortness of breath.   Review of symptoms otherwise negative.    MEDICATIONS:  allopurinol 100 milliGRAM(s) Oral daily  apixaban 2.5 milliGRAM(s) Oral two times a day  chlorhexidine 2% Cloths 1 Application(s) Topical daily  furosemide    Tablet 80 milliGRAM(s) Oral daily  hydroxyurea 500 milliGRAM(s) Oral daily  influenza  Vaccine (HIGH DOSE) 0.5 milliLiter(s) IntraMuscular once  metoprolol tartrate 25 milliGRAM(s) Oral two times a day    LABS:                        9.2    25.63 )-----------( 768      ( 06 Nov 2024 04:55 )             30.9     Hemoglobin: 9.2 g/dL (11-06 @ 04:55)  Hemoglobin: 9.1 g/dL (11-05 @ 05:47)  Hemoglobin: 9.6 g/dL (11-04 @ 06:00)  Hemoglobin: 9.7 g/dL (11-03 @ 07:10)  Hemoglobin: 8.7 g/dL (11-02 @ 05:53)      11-06    134[L]  |  93[L]  |  62[H]  ----------------------------<  45[LL]  5.0   |  26  |  2.58[H]    Ca    8.9      06 Nov 2024 04:55  Phos  5.1     11-06  Mg     2.20     11-06      Creatinine Trend: 2.58<--, 2.87<--, 3.34<--, 3.12<--, 3.25<--, 3.58<--  COAGS: PTT - ( 06 Nov 2024 04:55 )  PTT:34.6 sec    PHYSICAL EXAM:  T(C): 36.7 (11-06-24 @ 05:00), Max: 37.1 (11-05-24 @ 15:53)  HR: 96 (11-06-24 @ 05:00) (82 - 117)  BP: 118/64 (11-06-24 @ 05:00) (99/54 - 118/64)  RR: 18 (11-06-24 @ 05:00) (18 - 20)  SpO2: 98% (11-06-24 @ 05:00) (98% - 100%)  Wt(kg): --    I&O's Summary      General: Well nourished in no acute distress. Alert and Oriented * 3.   Head: Normocephalic and atraumatic.   Neck: + JVD. No bruits. Supple. Does not appear to be enlarged.   Cardiovascular: + S1,S2 ; RRR Soft systolic murmur at the left lower sternal border. No rubs noted.    Lungs: CTA b/l. No rhonchi, rales or wheezes.   Abdomen: + BS, soft. Non tender. Non distended. No rebound. No guarding.   Extremities: No clubbing/cyanosis/1+ LE edema   Neurologic: Moves all four extremities. Full range of motion.   Skin: Warm and moist. The patient's skin has normal elasticity and good skin turgor.   Psychiatric: Appropriate mood and affect.  Musculoskeletal: Normal range of motion, normal strength    DATA    TELEMETRY: 	AF      ECG:  Afib with RVR	    < from: CT Chest No Cont (10.29.24 @ 09:53) >  IMPRESSION:  *  Bilateral pulmonary ground glass opacity consistent with edema versus   pneumonia.  *  Trace bilateral pleural effusions, ascites, and anasarca.  *  No evidence of acute infectious process in the abdomen and pelvis.  *  Bilateral renal cysts and right renal atrophy. No hydronephrosis.    < end of copied text >    < from: CT Head No Cont (10.29.24 @ 09:51) >  IMPRESSION:    No acute pathology.    < end of copied text >      < from: TTE W or WO Ultrasound Enhancing Agent (10.29.24 @ 13:17) >  CONCLUSIONS:      1. Left ventricular endocardium is not well visualized; however, the left ventricular systolic function appears grossly normal.   2. Enlarged right ventricular cavity size and reduced right ventricular systolic function.   3. There is calcification of the aortic valve leaflets. There is severe aortic stenosis. The peak transaortic velocity is 2.33 m/s, peak transaortic gradient is 21.8 mmHg and mean transaortic gradient is 13.3 mmHg with an LVOT/aortic valve VTI ratio of 0.39. The aortic valve area is estimated at 1.00 cm² by the continuity equation.   4. Structurally normal mitral valve with normal leaflet excursion. There is calcification of the mitral valve annulus. There is moderate to severe mitral regurgitation.   5. Structurally normal tricuspid valve with normal leaflet excursion. There is moderate to severe tricuspid regurgitation.   6. No pericardial effusion seen.   7. The inferior vena cava is dilated (dilated >2.1cm) with abnormal inspiratory collapse (abnormal <50%) consistent with elevated right atrial pressure (~15, range 10-20mmHg).   8. Technically difficult image quality.    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    #Rapid Afib  -- in the setting of multiple medical issues (volume overload, elevated WBC from baseline, EDA, transaminitis)  -- cont Lopressor  25 bid   -- Eliquis restarted    #Volume Overload  -- pt with known valvular disease  -- TTE with severe AS, mod-sev MR, mod-sev TR  -- transitioned to oral lasix, decrease to 80MG daily  -- previously saw CTS per Dr. Chow and prefer conservative management, Spoke with Daughter in law Ginger Guerin and prefer conservative tx    #EDA  -- Renal Following, no plans for HD at this time, EDA improving    Jesenia Hills MD  Pager: [phone]                   DATE OF SERVICE: 11-06-24    Patient denies chest pain or shortness of breath.   Review of symptoms otherwise negative.    MEDICATIONS:  allopurinol 100 milliGRAM(s) Oral daily  apixaban 2.5 milliGRAM(s) Oral two times a day  chlorhexidine 2% Cloths 1 Application(s) Topical daily  furosemide    Tablet 80 milliGRAM(s) Oral daily  hydroxyurea 500 milliGRAM(s) Oral daily  influenza  Vaccine (HIGH DOSE) 0.5 milliLiter(s) IntraMuscular once  metoprolol tartrate 25 milliGRAM(s) Oral two times a day    LABS:                        9.2    25.63 )-----------( 768      ( 06 Nov 2024 04:55 )             30.9     Hemoglobin: 9.2 g/dL (11-06 @ 04:55)  Hemoglobin: 9.1 g/dL (11-05 @ 05:47)  Hemoglobin: 9.6 g/dL (11-04 @ 06:00)  Hemoglobin: 9.7 g/dL (11-03 @ 07:10)  Hemoglobin: 8.7 g/dL (11-02 @ 05:53)      11-06    134[L]  |  93[L]  |  62[H]  ----------------------------<  45[LL]  5.0   |  26  |  2.58[H]    Ca    8.9      06 Nov 2024 04:55  Phos  5.1     11-06  Mg     2.20     11-06      Creatinine Trend: 2.58<--, 2.87<--, 3.34<--, 3.12<--, 3.25<--, 3.58<--  COAGS: PTT - ( 06 Nov 2024 04:55 )  PTT:34.6 sec    PHYSICAL EXAM:  T(C): 36.7 (11-06-24 @ 05:00), Max: 37.1 (11-05-24 @ 15:53)  HR: 96 (11-06-24 @ 05:00) (82 - 117)  BP: 118/64 (11-06-24 @ 05:00) (99/54 - 118/64)  RR: 18 (11-06-24 @ 05:00) (18 - 20)  SpO2: 98% (11-06-24 @ 05:00) (98% - 100%)  Wt(kg): --    I&O's Summary      General: Well nourished in no acute distress. Alert and Oriented * 3.   Head: Normocephalic and atraumatic.   Neck: + JVD. No bruits. Supple. Does not appear to be enlarged.   Cardiovascular: + S1,S2 ; RRR Soft systolic murmur at the left lower sternal border. No rubs noted.    Lungs: CTA b/l. No rhonchi, rales or wheezes.   Abdomen: + BS, soft. Non tender. Non distended. No rebound. No guarding.   Extremities: No clubbing/cyanosis/1+ LE edema   Neurologic: Moves all four extremities. Full range of motion.   Skin: Warm and moist. The patient's skin has normal elasticity and good skin turgor.   Psychiatric: Appropriate mood and affect.  Musculoskeletal: Normal range of motion, normal strength    DATA    TELEMETRY: 	AF      ECG:  Afib with RVR	    < from: CT Chest No Cont (10.29.24 @ 09:53) >  IMPRESSION:  *  Bilateral pulmonary ground glass opacity consistent with edema versus   pneumonia.  *  Trace bilateral pleural effusions, ascites, and anasarca.  *  No evidence of acute infectious process in the abdomen and pelvis.  *  Bilateral renal cysts and right renal atrophy. No hydronephrosis.    < end of copied text >    < from: CT Head No Cont (10.29.24 @ 09:51) >  IMPRESSION:    No acute pathology.    < end of copied text >      < from: TTE W or WO Ultrasound Enhancing Agent (10.29.24 @ 13:17) >  CONCLUSIONS:      1. Left ventricular endocardium is not well visualized; however, the left ventricular systolic function appears grossly normal.   2. Enlarged right ventricular cavity size and reduced right ventricular systolic function.   3. There is calcification of the aortic valve leaflets. There is severe aortic stenosis. The peak transaortic velocity is 2.33 m/s, peak transaortic gradient is 21.8 mmHg and mean transaortic gradient is 13.3 mmHg with an LVOT/aortic valve VTI ratio of 0.39. The aortic valve area is estimated at 1.00 cm² by the continuity equation.   4. Structurally normal mitral valve with normal leaflet excursion. There is calcification of the mitral valve annulus. There is moderate to severe mitral regurgitation.   5. Structurally normal tricuspid valve with normal leaflet excursion. There is moderate to severe tricuspid regurgitation.   6. No pericardial effusion seen.   7. The inferior vena cava is dilated (dilated >2.1cm) with abnormal inspiratory collapse (abnormal <50%) consistent with elevated right atrial pressure (~15, range 10-20mmHg).   8. Technically difficult image quality.    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    #Rapid Afib  -- in the setting of multiple medical issues (volume overload, elevated WBC from baseline, EDA, transaminitis)  -- increase Lopressor  50 mg bid   -- Eliquis restarted    #Volume Overload  -- pt with known valvular disease  -- TTE with severe AS, mod-sev MR, mod-sev TR  -- transitioned to oral lasix, decrease to 80MG daily  -- previously saw CTS per Dr. Chow and prefer conservative management, Spoke with Daughter in law Ginger Guerin and prefer conservative tx    #EDA  -- Renal Following, no plans for HD at this time, EDA improving    Jesenia Hills MD  Pager: [phone]

## 2024-11-06 NOTE — PROGRESS NOTE ADULT - PROBLEM SELECTOR PLAN 8
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 Trace bilateral pleural effusions, ascites, and anasarca. No evidence of acute infectious process in the abdomen and pelvis. Bilateral renal cysts and right renal atrophy. No hydronephrosis.  -abd ultrasound showed 1.9cm Gall stone and equivocal for cholecystitis. seen by sx, per sx: Not a surgical candidate at this time.  Plan:  - s/p empiric zosyn x5 days (last dose 11/3)   - ID recs appreciated. monitor off further abx  - Heme recs appreciated. "concerning that pt developed evidence of heart failure while on anagrelide. Difficult to assign all of her symptoms to this agent. However, given the option of hydroxyurea as an alternative, will proceed to restart that as she was on it in the past   - restart hydroxyurea 500 mg daily until discharge, and then consider to reduce to every other day dosing to improve tolerance.
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 Trace bilateral pleural effusions, ascites, and anasarca. No evidence of acute infectious process in the abdomen and pelvis. Bilateral renal cysts and right renal atrophy. No hydronephrosis.  -abd ultrasound showed 1.9cm Gall stone and equivocal for cholecystitis. seen by sx, per sx: Not a surgical candidate at this time.  Plan:  - s/p empiric zosyn x5 days (last dose 11/3)   - ID recs appreciated. monitor off further abx  - Heme recs appreciated. "concerning that pt developed evidence of heart failure while on anagrelide. Difficult to assign all of her symptoms to this agent. However, given the option of hydroxyurea as an alternative, will proceed to restart that as she was on it in the past   - restart hydroxyurea 500 mg daily until discharge, and then consider to reduce to every other day dosing to improve tolerance.
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  -Trace bilateral pleural effusions, ascites, and anasarca. No evidence of acute infectious process in the abdomen and pelvis. Bilateral renal cysts and right renal atrophy. No hydronephrosis.  -abd ultrasound showed 1.9cm Gall stone and equivocal for cholecystitis. seen by sx, per sx Not a surgical candidate at this time.
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 Trace bilateral pleural effusions, ascites, and anasarca. No evidence of acute infectious process in the abdomen and pelvis. Bilateral renal cysts and right renal atrophy. No hydronephrosis.  -abd ultrasound showed 1.9cm Gall stone and equivocal for cholecystitis. seen by sx, per sx: Not a surgical candidate at this time.  Plan:  - s/p empiric zosyn x5 days (last dose 11/3)   - ID recs appreciated. monitor off further abx  - Heme recs appreciated. "concerning that pt developed evidence of heart failure while on anagrelide. Difficult to assign all of her symptoms to this agent. However, given the option of hydroxyurea as an alternative, will proceed to restart that as she was on it in the past   - restart hydroxyurea 500 mg daily until discharge, and then consider to reduce to every other day dosing to improve tolerance.
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  -Trace bilateral pleural effusions, ascites, and anasarca. No evidence of acute infectious process in the abdomen and pelvis. Bilateral renal cysts and right renal atrophy. No hydronephrosis.  -abd ultrasound showed 1.9cm Gall stone and equivocal for cholecystitis. seen by sx, per sx Not a surgical candidate at this time.
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  -Trace bilateral pleural effusions, ascites, and anasarca. No evidence of acute infectious process in the abdomen and pelvis. Bilateral renal cysts and right renal atrophy. No hydronephrosis.  -abd ultrasound showed 1.9cm Gall stone and equivocal for cholecystitis. seen by sx, per sx Not a surgical candidate at this time.
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  -Trace bilateral pleural effusions, ascites, and anasarca. No evidence of acute infectious process in the abdomen and pelvis. Bilateral renal cysts and right renal atrophy. No hydronephrosis.  -abd ultrasound showed 1.9cm Gall stone and equivocal for cholecystitis. seen by sx, per sx Not a surgical candidate at this time.
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  -Trace bilateral pleural effusions, ascites, and anasarca. No evidence of acute infectious process in the abdomen and pelvis. Bilateral renal cysts and right renal atrophy. No hydronephrosis.  -abd ultrasound showed 1.9cm Gall stone and equivocal for cholecystitis. seen by sx, per sx Not a surgical candidate at this time.
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  -Trace bilateral pleural effusions, ascites, and anasarca. No evidence of acute infectious process in the abdomen and pelvis. Bilateral renal cysts and right renal atrophy. No hydronephrosis.  -abd ultrasound showed 1.9cm Gall stone and equivocal for cholecystitis. seen by sx, per sx Not a surgical candidate at this time.

## 2024-11-06 NOTE — PROGRESS NOTE ADULT - SUBJECTIVE AND OBJECTIVE BOX
[facility], Division of Infectious Diseases  JOSE Carter Y. Patel, S. Shah, G. [facility]  [phone]    Name: TRIP MAGALLANES  Age: 84y  Gender: Female  MRN: [id number]    Interval History:  Patient seen and examined at bedside  No acute overnight events. Afebrile  No complaints  Notes reviewed    Antibiotics:      Medications:  allopurinol 100 milliGRAM(s) Oral daily  apixaban 2.5 milliGRAM(s) Oral two times a day  chlorhexidine 2% Cloths 1 Application(s) Topical daily  furosemide    Tablet 80 milliGRAM(s) Oral daily  hydroxyurea 500 milliGRAM(s) Oral daily  influenza  Vaccine (HIGH DOSE) 0.5 milliLiter(s) IntraMuscular once  metoprolol tartrate 50 milliGRAM(s) Oral two times a day      Review of Systems:  A 10-point review of systems was obtained.   Review of systems otherwise negative except as previously noted.    Allergies: No Known Drug Allergies  eggs (Unknown)    For details regarding the patient's past medical history, social history, family history, and other miscellaneous elements, please refer the initial infectious diseases consultation and/or the admitting history and physical examination for this admission.    Objective:  Vitals:   T(C): 36.7 (11-06-24 @ 05:00), Max: 37.1 (11-05-24 @ 15:53)  HR: 96 (11-06-24 @ 05:00) (82 - 117)  BP: 118/64 (11-06-24 @ 05:00) (99/54 - 118/64)  RR: 18 (11-06-24 @ 05:00) (18 - 20)  SpO2: 98% (11-06-24 @ 05:00) (98% - 100%)    Physical Examination:  General: no acute distress  HEENT: NC/AT, EOMI,   Cardio: S1, S2 heard, RRR, no murmurs  Resp: CTA  Abd: soft, NT, ND,  Ext: no edema or cyanosis  Skin: warm, dry, no visible rash      Laboratory Studies:  CBC:                       9.2    25.63 )-----------( 768      ( 06 Nov 2024 04:55 )             30.9     CMP: 11-06    134[L]  |  93[L]  |  62[H]  ----------------------------<  45[LL]  5.0   |  26  |  2.58[H]    Ca    8.9      06 Nov 2024 04:55  Phos  5.1     11-06  Mg     2.20     11-06        Urinalysis Basic - ( 06 Nov 2024 04:55 )    Color: x / Appearance: x / SG: x / pH: x  Gluc: 45 mg/dL / Ketone: x  / Bili: x / Urobili: x   Blood: x / Protein: x / Nitrite: x   Leuk Esterase: x / RBC: x / WBC x   Sq Epi: x / Non Sq Epi: x / Bacteria: x        Microbiology: reviewed    Culture - Urine (collected 10-29-24 @ 09:31)  Source: Clean Catch Clean Catch (Midstream)  Final Report (10-30-24 @ 06:36):    <10,000 CFU/mL Normal Urogenital Kimberly    Culture - Blood (collected 10-28-24 @ 19:10)  Source: .Blood BLOOD  Final Report (11-03-24 @ 01:19):    No growth at 5 days    Culture - Blood (collected 10-28-24 @ 19:05)  Source: .Blood BLOOD  Final Report (11-03-24 @ 01:19):    No growth at 5 days          Radiology: reviewed

## 2024-11-06 NOTE — PROGRESS NOTE ADULT - PROBLEM SELECTOR PLAN 2
Pt. with metabolic acidosis in the setting of renal failure and fluid overload. Pt. received IV sodium bicarbonate infusion and IV Lasix. SCO2 stable at 25 today. Monitor pH, and SCO2.    If you have any questions, please feel free to contact me  Jeny Bronson  Nephrology  [phone] / [employer] Teams(Preferred)  (After 4 pm or on weekends please page the on-call fellow)
-Cr acutely increased to 4.28, on 10/14 was ~2.5  -Atrophic kidney seen on abdominal ultrasound, renal function improving  -Ddx progressive CKD vs. cardiorenal syndrome vs. infection    -Monitor I&O's  -lactate improved, creatinine improving  -per renal No urgent indications for HD currently but will continue to monitor. dc bicarb drip  - Nephro following, appreciate recs
Pt. with metabolic acidosis in the setting of renal failure and fluid overload. Pt. received IV sodium bicarbonate infusion and IV Lasix. pH increased to 7.44, and SCO2 improved to 24 today. Recommend to discontinue IV sodium bicarbonate infusion. Monitor pH, and sCO2.    If you have any questions, please feel free to contact me  Jeny Bronson  Nephrology  [phone] / [employer] Teams(Preferred)  (After 4 pm or on weekends please page the on-call fellow)
Pt. with metabolic acidosis in the setting of renal failure and fluid overload. Pt. received IV sodium bicarbonate infusion and IV Lasix. SCO2 stable at 27 today. Monitor pH, and sCO2.    If you have any questions, please feel free to contact me  Jeny Bronson  Nephrology  [phone] / [employer] Teams(Preferred)  (After 4 pm or on weekends please page the on-call fellow)
Pt. with metabolic acidosis in the setting of renal failure and fluid overload. Pt. received IV sodium bicarbonate infusion and IV Lasix. SCO2 stable at 27 today. Monitor pH, and SCO2.    If you have any questions, please feel free to contact me  Jeny Bronson  Nephrology  [phone] / [employer] Teams(Preferred)  (After 4 pm or on weekends please page the on-call fellow)
Pt. with metabolic acidosis in the setting of renal failure and fluid overload. Pt. received IV sodium bicarbonate infusion and IV Lasix. SCO2 stable at 26 today. Monitor pH, and SCO2.    If you have any questions, please feel free to contact me  Jeny Bronson  Nephrology  [phone] / [employer] Teams(Preferred)  (After 4 pm or on weekends please page the on-call fellow)
-Cr acutely increased to 4.33 on admission. Cr was around 2.5 as of 10/14  -Atrophic kidney seen on abdominal ultrasound  -renal function improving with diuresis c/w cardiorenal syndrome  -Monitor I&Os  -per renal No urgent indications for HD currently but will continue to monitor. dc bicarb drip  - Nephro following, appreciate recs  - continue PO lasix 80mg BID
Pt. with metabolic acidosis in the setting of renal failure and fluid overload. Pt. received IV sodium bicarbonate infusion and IV Lasix. SCO2 stable at 25 today. Monitor pH, and SCO2.    If you have any questions, please feel free to contact me  Jeny Bronson  Nephrology  [phone] / [employer] Teams(Preferred)  (After 4 pm or on weekends please page the on-call fellow)
Pt. with metabolic acidosis in the setting of renal failure and fluid overload. Pt. received IV sodium bicarbonate infusion. SCO2 stable at 27 today. Pt is on IV lasix 80 BID. Can point to volume contraction alkalosis. Check serum uric acid.  Monitor pH, and SCO2.
-Cr acutely increased to 4.33 on admission. Cr was around 2.5 as of 10/14  -Atrophic kidney seen on abdominal ultrasound  -renal function improving with diuresis c/w cardiorenal syndrome  -Monitor I&Os  -per renal No urgent indications for HD currently but will continue to monitor. dc bicarb drip  - Nephro following, appreciate recs  - continue PO lasix 80mg BID
-Cr acutely increased to 4.28, on 10/14 was ~2.5  -Atrophic kidney seen on abdominal ultrasound, renal function improving  -Ddx progressive CKD vs. cardiorenal syndrome vs. infection    -Monitor I&O's  -lactate improved, creatinine improving  -per renal No urgent indications for HD currently but will continue to monitor. dc bicarb drip  - Nephro following, appreciate recs
-Cr acutely increased to 4.33 on admission. Cr was around 2.5 as of 10/14  -Atrophic kidney seen on abdominal ultrasound  -renal function improving with diuresis c/w cardiorenal syndrome  -Monitor I&Os  -per renal No urgent indications for HD currently but will continue to monitor. dc bicarb drip  - Nephro following, appreciate recs  - continue PO lasix 80mg BID
-Cr acutely increased to 4.28, on 10/14 was ~2.5  -Atrophic kidney seen on abdominal ultrasound, renal function improving  -Ddx progressive CKD vs. cardiorenal syndrome vs. infection    -Monitor I&O's  -lactate improved, creatinine improving  -per renal No urgent indications for HD currently but will continue to monitor. dc bicarb drip  - Nephro following, appreciate recs
-Cr acutely increased to 4.28, on 10/14 was ~2.5  -Atrophic kidney seen on abdominal ultrasound, renal function improving  -Ddx progressive CKD vs. cardiorenal syndrome vs. infection    -Monitor I&O's  -lactate improved, creatinine improving  -per renal No urgent indications for HD currently but will continue to monitor. dc bicarb drip  - Nephro following, appreciate recs
-Cr acutely increased to 4.28, on 10/14 was ~2.5  -Atrophic kidney seen on abdominal ultrasound  -Ddx progressive CKD vs. cardiorenal syndrome vs. infection    -Monitor I&O's  -rpt lactate  -per renal No urgent indications for HD currently but will continue to monitor. Agree with gentle IV fluid hydration given elevated serum uric acid and lactate levels. Would prefer IV sodium bicarbonate infusion (150 meq sodium bicarbonate in 1L D5W at 100 cc/hr). Obtain echocardiogram. Repeat BMP in 4-6 hours. Monitor labs and urine output. Avoid nephrotoxins. Dose medications as per eGFR.   - Nephro following, appreciate recs
-Cr acutely increased to 4.28, on 10/14 was ~2.5  -Atrophic kidney seen on abdominal ultrasound, renal function improving  -Ddx progressive CKD vs. cardiorenal syndrome vs. infection    -Monitor I&O's  -lactate improved  -per renal No urgent indications for HD currently but will continue to monitor. dc bicarb drip  - Nephro following, appreciate recs

## 2024-11-06 NOTE — PROGRESS NOTE ADULT - SUBJECTIVE AND OBJECTIVE BOX
Bhargav Hernández MD  Division of Hospitalist Medicine  Pager [phone]  Available on Teams    CC: Patient is a 84y old  Female who presents with a chief complaint of shortness of breath and leg edema (06 Nov 2024 14:28)        SUBJECTIVE / INTERVAL HPI: Patient seen and examined at bedside. Pt grossly denies any acute complaints.     ROS: negative unless otherwise stated above.    VITAL SIGNS:  Vital Signs Last 24 Hrs  T(C): 36.7 (06 Nov 2024 05:00), Max: 36.7 (06 Nov 2024 05:00)  T(F): 98.1 (06 Nov 2024 05:00), Max: 98.1 (06 Nov 2024 05:00)  HR: 96 (06 Nov 2024 05:00) (96 - 117)  BP: 118/64 (06 Nov 2024 05:00) (99/54 - 118/64)  BP(mean): 59 (05 Nov 2024 17:40) (59 - 59)  RR: 18 (06 Nov 2024 05:00) (18 - 18)  SpO2: 98% (06 Nov 2024 05:00) (98% - 100%)    Parameters below as of 06 Nov 2024 05:00  Patient On (Oxygen Delivery Method): room air            PHYSICAL EXAM:    General: NAD  HEENT: MMM  Neck: supple  Cardiovascular: +S1/S2; RRR  Respiratory: CTA B/L; no W/R/R  Gastrointestinal: soft, NT/ND  Extremities: WWP; no edema, clubbing or cyanosis  Neurological: awake and alert; communicating and able to follow commands without appreciated focal neurologic deficits    MEDICATIONS:  MEDICATIONS  (STANDING):  allopurinol 100 milliGRAM(s) Oral daily  apixaban 2.5 milliGRAM(s) Oral two times a day  chlorhexidine 2% Cloths 1 Application(s) Topical daily  furosemide    Tablet 80 milliGRAM(s) Oral daily  hydroxyurea 500 milliGRAM(s) Oral daily  influenza  Vaccine (HIGH DOSE) 0.5 milliLiter(s) IntraMuscular once  metoprolol tartrate 50 milliGRAM(s) Oral two times a day    MEDICATIONS  (PRN):      ALLERGIES:  Allergies    No Known Drug Allergies  eggs (Unknown)    Intolerances        LABS:                        9.2    25.63 )-----------( 768      ( 06 Nov 2024 04:55 )             30.9     11-06    134[L]  |  93[L]  |  62[H]  ----------------------------<  45[LL]  5.0   |  26  |  2.58[H]    Ca    8.9      06 Nov 2024 04:55  Phos  5.1     11-06  Mg     2.20     11-06      PTT - ( 06 Nov 2024 04:55 )  PTT:34.6 sec  Urinalysis Basic - ( 06 Nov 2024 04:55 )    Color: x / Appearance: x / SG: x / pH: x  Gluc: 45 mg/dL / Ketone: x  / Bili: x / Urobili: x   Blood: x / Protein: x / Nitrite: x   Leuk Esterase: x / RBC: x / WBC x   Sq Epi: x / Non Sq Epi: x / Bacteria: x      CAPILLARY BLOOD GLUCOSE      POCT Blood Glucose.: 168 mg/dL (06 Nov 2024 09:29)      RADIOLOGY & ADDITIONAL TESTS: Reviewed.

## 2024-11-06 NOTE — PROGRESS NOTE ADULT - PROBLEM SELECTOR PROBLEM 3
Coagulopathy

## 2024-11-07 VITALS
RESPIRATION RATE: 18 BRPM | TEMPERATURE: 98 F | HEART RATE: 101 BPM | DIASTOLIC BLOOD PRESSURE: 64 MMHG | SYSTOLIC BLOOD PRESSURE: 110 MMHG | OXYGEN SATURATION: 98 %

## 2024-11-07 LAB
ANION GAP SERPL CALC-SCNC: 11 MMOL/L — SIGNIFICANT CHANGE UP (ref 7–14)
BUN SERPL-MCNC: 55 MG/DL — HIGH (ref 7–23)
CALCIUM SERPL-MCNC: 8.3 MG/DL — LOW (ref 8.4–10.5)
CHLORIDE SERPL-SCNC: 98 MMOL/L — SIGNIFICANT CHANGE UP (ref 98–107)
CO2 SERPL-SCNC: 26 MMOL/L — SIGNIFICANT CHANGE UP (ref 22–31)
CREAT SERPL-MCNC: 2.26 MG/DL — HIGH (ref 0.5–1.3)
EGFR: 21 ML/MIN/1.73M2 — LOW
GLUCOSE SERPL-MCNC: 88 MG/DL — SIGNIFICANT CHANGE UP (ref 70–99)
HCT VFR BLD CALC: 28 % — LOW (ref 34.5–45)
HGB BLD-MCNC: 8.5 G/DL — LOW (ref 11.5–15.5)
MAGNESIUM SERPL-MCNC: 2.1 MG/DL — SIGNIFICANT CHANGE UP (ref 1.6–2.6)
MCHC RBC-ENTMCNC: 23.8 PG — LOW (ref 27–34)
MCHC RBC-ENTMCNC: 30.4 G/DL — LOW (ref 32–36)
MCV RBC AUTO: 78.4 FL — LOW (ref 80–100)
NRBC # BLD: 0 /100 WBCS — SIGNIFICANT CHANGE UP (ref 0–0)
NRBC # FLD: 0.04 K/UL — HIGH (ref 0–0)
PHOSPHATE SERPL-MCNC: 4.8 MG/DL — HIGH (ref 2.5–4.5)
PLATELET # BLD AUTO: 640 K/UL — HIGH (ref 150–400)
POTASSIUM SERPL-MCNC: 4.4 MMOL/L — SIGNIFICANT CHANGE UP (ref 3.5–5.3)
POTASSIUM SERPL-SCNC: 4.4 MMOL/L — SIGNIFICANT CHANGE UP (ref 3.5–5.3)
RBC # BLD: 3.57 M/UL — LOW (ref 3.8–5.2)
RBC # FLD: 25.3 % — HIGH (ref 10.3–14.5)
SODIUM SERPL-SCNC: 135 MMOL/L — SIGNIFICANT CHANGE UP (ref 135–145)
WBC # BLD: 20.46 K/UL — HIGH (ref 3.8–10.5)
WBC # FLD AUTO: 20.46 K/UL — HIGH (ref 3.8–10.5)

## 2024-11-07 PROCEDURE — 99239 HOSP IP/OBS DSCHRG MGMT >30: CPT

## 2024-11-07 RX ORDER — APIXABAN 5 MG/1
1 TABLET, FILM COATED ORAL
Qty: 0 | Refills: 0 | DISCHARGE
Start: 2024-11-07

## 2024-11-07 RX ORDER — ALLOPURINOL 100 MG
1 TABLET ORAL
Qty: 0 | Refills: 0 | DISCHARGE
Start: 2024-11-07

## 2024-11-07 RX ORDER — METOPROLOL TARTRATE 50 MG
1 TABLET ORAL
Refills: 0 | DISCHARGE

## 2024-11-07 RX ORDER — HYDROXYUREA 500 MG
1 CAPSULE ORAL
Qty: 0 | Refills: 0 | DISCHARGE
Start: 2024-11-07

## 2024-11-07 RX ORDER — ANAGRELIDE HYDROCHLORIDE 0.5 MG/1
0 CAPSULE ORAL
Refills: 0 | DISCHARGE

## 2024-11-07 RX ORDER — FUROSEMIDE 40 MG
1 TABLET ORAL
Qty: 0 | Refills: 0 | DISCHARGE
Start: 2024-11-07

## 2024-11-07 RX ORDER — FUROSEMIDE 40 MG
1 TABLET ORAL
Refills: 0 | DISCHARGE

## 2024-11-07 RX ORDER — APIXABAN 5 MG/1
1 TABLET, FILM COATED ORAL
Refills: 0 | DISCHARGE

## 2024-11-07 RX ORDER — SACUBITRIL AND VALSARTAN 97; 103 MG/1; MG/1
0 TABLET, FILM COATED ORAL
Refills: 0 | DISCHARGE

## 2024-11-07 RX ORDER — METOPROLOL TARTRATE 50 MG
1 TABLET ORAL
Qty: 0 | Refills: 0 | DISCHARGE
Start: 2024-11-07

## 2024-11-07 RX ADMIN — Medication 50 MILLIGRAM(S): at 17:05

## 2024-11-07 RX ADMIN — Medication 50 MILLIGRAM(S): at 05:07

## 2024-11-07 RX ADMIN — Medication 100 MILLIGRAM(S): at 17:05

## 2024-11-07 RX ADMIN — CHLORHEXIDINE GLUCONATE 1 APPLICATION(S): 40 SOLUTION TOPICAL at 17:08

## 2024-11-07 RX ADMIN — Medication 500 MILLIGRAM(S): at 17:05

## 2024-11-07 RX ADMIN — APIXABAN 2.5 MILLIGRAM(S): 5 TABLET, FILM COATED ORAL at 05:07

## 2024-11-07 RX ADMIN — APIXABAN 2.5 MILLIGRAM(S): 5 TABLET, FILM COATED ORAL at 17:05

## 2024-11-07 RX ADMIN — Medication 80 MILLIGRAM(S): at 05:07

## 2024-11-07 NOTE — PROGRESS NOTE ADULT - REASON FOR ADMISSION
shortness of breath and leg edema
Pt presented to ED with difficulty breathing, SOB; admitted with Sepsis, acute UTI, Afib with RVR
shortness of breath and leg edema

## 2024-11-07 NOTE — CHART NOTE - NSCHARTNOTEFT_GEN_A_CORE
Chart reviewed. Patient pending DC to [facility].  Platelets improved to 640K today on HU 500mg daily.  Recommend CBC with diff 1-2x/week while in rehab to monitor Hgb and platelets. Maintain Hgb >7.  Please decrease Hydroxyurea 500mg every other day upon discharge.  Patient should have follow up appt with Dr. Wallre [facility]. Please have family call to schedule.

## 2024-11-07 NOTE — PROGRESS NOTE ADULT - NS ATTEND AMEND GEN_ALL_CORE FT
Patient seen and examined. Agree with plan as detailed in PA/NP Note.     C/w diuresis  f/u [facility]    Jesenia Hills MD  Pager: [phone]
Patient seen and examined. Agree with plan as detailed in PA/NP Note.     C/w Lasix 80 mg daily, Eliquis and MICKI Hills MD  Pager: [phone]
resting in bed,no new complaints. no change to above plan of care.
Patient seen and examined. Agree with plan as detailed in PA/NP Note.     C/w oral lasix  C/w oral BB  Attempted to reach Daughter in law at [phone] with no answer    Jesenia Hills MD  Pager: [phone]
no change in above plan of care, will follow with you
Patient seen and examined. Agree with plan as detailed in PA/NP Note.     C/w diuresis as astill volume overloaded    Jesenia Hills MD  Pager: [phone]
Patient seen and examined. Agree with plan as detailed in PA/NP Note.     previously saw [facility] per Dr. Chow and prefer conservative management  Spoke with Daughter in law Ginger Guerin and prefer conservative tx  Can decrease Lasix to 80 mg PO daily    Jesenia Hills MD  Pager: [phone]
no changes made to above plan of care, will follow with you

## 2024-11-07 NOTE — PROGRESS NOTE ADULT - PROVIDER SPECIALTY LIST ADULT
Cardiology
Electrophysiology
Heme/Onc
Infectious Disease
Infectious Disease
Nephrology
Cardiology
Electrophysiology
Infectious Disease
Nephrology
Cardiology
Electrophysiology
Heme/Onc
Infectious Disease
Nephrology
Hospitalist
Hospitalist
Nephrology
Nephrology
Hospitalist

## 2024-11-07 NOTE — PROGRESS NOTE ADULT - ASSESSMENT
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  CT abd/pelvis- no evidence of acute infectious process. right renal cyst contains mildly thick septations  abd US- 1.9 cm gallstone. Mild gallbladder wall thickening. No pericholecystic fluid. equivocal for cholecystitis.  s/p surgery eval- low suspicion for cholecystitis, not a surgical candidate  MRSA PCR negative  S/p zosyn x5 day course completed 11/2    CHF, elevated liver enzymes  c/f congestive hepatopathy  b/l LE edema  pleural effusions, ascites, anasarca on CT  TTE- severe aortic stenosis, moderate to severe MR & TR    ET/MPN  leukocytosis likely significantly in part 2/2 ET/MPN  LDH, uric acid elevated  - rasburicase given, on allopurinol    EDA  nephrology following    Recommendations  Monitor off ABx  Trend temps/WBC -- leukocytosis elevated, but downtrending  heme/onc following  cardiology following  additional care per primary team    Tom Kirk M.D.  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  CT abd/pelvis- no evidence of acute infectious process. right renal cyst contains mildly thick septations  abd US- 1.9 cm gallstone. Mild gallbladder wall thickening. No pericholecystic fluid. equivocal for cholecystitis.  s/p surgery eval- low suspicion for cholecystitis, not a surgical candidate  MRSA PCR negative  S/p zosyn x5 day course completed 11/2    CHF, elevated liver enzymes  c/f congestive hepatopathy  pleural effusions, ascites, anasarca on CT  TTE- severe aortic stenosis, moderate to severe MR & TR  s/p diuresis    ET/MPN  leukocytosis likely significantly in part 2/2 ET/MPN  LDH, uric acid elevated  - rasburicase given, on allopurinol    EDA- improved  nephrology following    Recommendations  continue off antibiotics  Trend temps/WBC -- leukocytosis elevated, but downtrending  heme/onc following  cardiology following  additional care per primary team    Tom Kirk M.D.  [facility], Division of Infectious Diseases  [phone]  After 5pm on weekdays and all day on weekends - please call [phone]

## 2024-11-07 NOTE — PROGRESS NOTE ADULT - SUBJECTIVE AND OBJECTIVE BOX
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  Collateral obtained from son and daughter in law who is her PCP. She has been unwell for the past week, lives above them on the second floor and at baseline is independent and ambulatory, but has been in bed and with poor PO intake over the past week. Per son had significant leg edema preventing her from walking, also w/ poor PO intake. Appeared to have shortness of breath. Symptoms started last Monday.  In the ED was in afib w/ RVR. Labs significant for WBC 49, tbili 2.0, alk phos 219, AST 1198, , Cr. 4.3 (baseline mid 2s), metabolic acidosis with lactate elevation to 5, VBG 7.23/37/15/51, proBNP 35k, Trop 78 --> 69, Ca 8.2. Abdominal US with gallbladder wall thickening.   S/p zosyn, lasix 40 mg IVP x2, metoprolol 25mg, lopressor IV 5mg x3, sodium bicarb 50 IVP, diltiazem 30mg.     AFib was rapid on arrival, now rate-controlled after IV AVN blockers.    Date of service 11/7- resting in bed, no overnight issues. NO SOB or pain, ROS limited      PAST MEDICAL & SURGICAL HISTORY:  Thrombocytosis  Hypertension  Hyperlipidemia  Afib  CHF (congestive heart failure)  No significant past surgical history      allopurinol 100 milliGRAM(s) Oral daily  apixaban 2.5 milliGRAM(s) Oral two times a day  chlorhexidine 2% Cloths 1 Application(s) Topical daily  furosemide    Tablet 80 milliGRAM(s) Oral daily  hydroxyurea 500 milliGRAM(s) Oral daily  influenza  Vaccine (HIGH DOSE) 0.5 milliLiter(s) IntraMuscular once  metoprolol tartrate 50 milliGRAM(s) Oral two times a day                            8.5    20.46 )-----------( 640      ( 07 Nov 2024 04:30 )             28.0       11-07    135  |  98  |  55[H]  ----------------------------<  88  4.4   |  26  |  2.26[H]    Ca    8.3[L]      07 Nov 2024 04:30  Phos  4.8     11-07  Mg     2.10     11-07  T(C): 36.8 (11-07-24 @ 12:07), Max: 36.8 (11-06-24 @ 20:26)  HR: 76 (11-07-24 @ 12:07) (76 - 103)  BP: 103/62 (11-07-24 @ 12:07) (99/54 - 117/61)  RR: 18 (11-07-24 @ 12:07) (18 - 18)  SpO2: 97% (11-07-24 @ 12:07) (97% - 99%)  Wt(kg): --    I&O's Summary    06 Nov 2024 07:01  -  07 Nov 2024 07:00  --------------------------------------------------------  IN: 0 mL / OUT: 0 mL / NET: 0 mL    07 Nov 2024 07:01  -  07 Nov 2024 14:43  --------------------------------------------------------  IN: 200 mL / OUT: 550 mL / NET: -350 mL        Appearance: Normal size elderly woman, no acute distress	  HEENT:   Normal oral mucosa, PERRL, EOMI	  Lymphatic: No lymphadenopathy , + edema in BL feet  Cardiovascular: irregular S1 S2, No JVD, No murmurs , Peripheral pulses palpable 2+ bilaterally  Respiratory: Lungs clear to auscultation, normal effort 	  Gastrointestinal:  Soft, Non-tender, + BS	  Skin: No rashes, No ecchymoses, No cyanosis, warm to touch  Musculoskeletal: moves all four extremities  Psychiatry:  Mood & affect appropriate    TELEMETRY: AF RVR --> rate controlled	    ECG: AF / RVR    ASSESSMENT/PLAN: Ms Guerin is an 84y Female brought in from home with altered mental status.    continue Metoprolol to 50 BID  continue apixaban 2.5mg BID, as she is >78yo and weight is <60kg.  No invasive EP procedures planned.

## 2024-11-07 NOTE — PROGRESS NOTE ADULT - SUBJECTIVE AND OBJECTIVE BOX
[facility], Division of Infectious Diseases  JOSE Carter Y. Patel, S. Shah, G. Reagan  [phone]  ([phone] - weekdays after 5pm and weekends)    Name: TRIP MAGALLANES  Age/Gender: 84y Female  MRN: [id number]    Interval History:  Notes reviewed.   No concerning overnight events.  Afebrile.     Allergies: No Known Drug Allergies  eggs (Unknown)      Objective:  Vitals:   T(F): 98.2 (11-07-24 @ 12:07), Max: 98.3 (11-06-24 @ 20:26)  HR: 76 (11-07-24 @ 12:07) (76 - 103)  BP: 103/62 (11-07-24 @ 12:07) (99/54 - 117/61)  RR: 18 (11-07-24 @ 12:07) (18 - 18)  SpO2: 97% (11-07-24 @ 12:07) (97% - 99%)  Physical Examination:  General: no acute distress  HEENT: anicteric  Cardio: S1, S2, normal rate, no murmur  Resp: clear to auscultation bilaterally  Abd: soft, NT, ND  Ext: no LE edema  Skin: warm, dry  Psych: appropriate affect and mood for situation    Laboratory Studies:  CBC:                       8.5    20.46 )-----------( 640      ( 07 Nov 2024 04:30 )             28.0     WBC Trend:  20.46 11-07-24 @ 04:30  25.63 11-06-24 @ 04:55  27.02 11-05-24 @ 05:47  28.22 11-04-24 @ 06:00  31.73 11-03-24 @ 07:10  32.71 11-02-24 @ 05:53  43.37 11-01-24 @ 03:40  43.81 10-31-24 @ 20:32    CMP: 11-07    135  |  98  |  55[H]  ----------------------------<  88  4.4   |  26  |  2.26[H]    Ca    8.3[L]      07 Nov 2024 04:30  Phos  4.8     11-07  Mg     2.10     11-07            Urinalysis Basic - ( 07 Nov 2024 04:30 )    Color: x / Appearance: x / SG: x / pH: x  Gluc: 88 mg/dL / Ketone: x  / Bili: x / Urobili: x   Blood: x / Protein: x / Nitrite: x   Leuk Esterase: x / RBC: x / WBC x   Sq Epi: x / Non Sq Epi: x / Bacteria: x      Microbiology: reviewed     Culture - Urine (collected 10-29-24 @ 09:31)  Source: Clean Catch Clean Catch (Midstream)  Final Report (10-30-24 @ 06:36):    <10,000 CFU/mL Normal Urogenital Kimberly    Culture - Blood (collected 10-28-24 @ 19:10)  Source: .Blood BLOOD  Final Report (11-03-24 @ 01:19):    No growth at 5 days    Culture - Blood (collected 10-28-24 @ 19:05)  Source: .Blood BLOOD  Final Report (11-03-24 @ 01:19):    No growth at 5 days        Radiology: reviewed     Medications:  allopurinol 100 milliGRAM(s) Oral daily  apixaban 2.5 milliGRAM(s) Oral two times a day  chlorhexidine 2% Cloths 1 Application(s) Topical daily  furosemide    Tablet 80 milliGRAM(s) Oral daily  hydroxyurea 500 milliGRAM(s) Oral daily  influenza  Vaccine (HIGH DOSE) 0.5 milliLiter(s) IntraMuscular once  metoprolol tartrate 50 milliGRAM(s) Oral two times a day    Antimicrobials:   [facility], Division of Infectious Diseases  JOSE Carter Y. Patel, S. Shah, G. Reagan  [phone]  ([phone] - weekdays after 5pm and weekends)    Name: TRIP MAGALLANES  Age/Gender: 84y Female  MRN: [id number]    Interval History:  Notes reviewed.   No concerning overnight events.  Afebrile.   denies any complaints    Allergies: No Known Drug Allergies  eggs (Unknown)      Objective:  Vitals:   T(F): 98.2 (11-07-24 @ 12:07), Max: 98.3 (11-06-24 @ 20:26)  HR: 76 (11-07-24 @ 12:07) (76 - 103)  BP: 103/62 (11-07-24 @ 12:07) (99/54 - 117/61)  RR: 18 (11-07-24 @ 12:07) (18 - 18)  SpO2: 97% (11-07-24 @ 12:07) (97% - 99%)  Physical Examination:  General: no acute distress  HEENT: anicteric  Cardio: S1, S2, normal rate  Resp: clear to auscultation anteriorly  Abd: soft, NT, ND  Ext: b/l LE edema,   Skin: warm, dry    Laboratory Studies:  CBC:                       8.5    20.46 )-----------( 640      ( 07 Nov 2024 04:30 )             28.0     WBC Trend:  20.46 11-07-24 @ 04:30  25.63 11-06-24 @ 04:55  27.02 11-05-24 @ 05:47  28.22 11-04-24 @ 06:00  31.73 11-03-24 @ 07:10  32.71 11-02-24 @ 05:53  43.37 11-01-24 @ 03:40  43.81 10-31-24 @ 20:32    CMP: 11-07    135  |  98  |  55[H]  ----------------------------<  88  4.4   |  26  |  2.26[H]    Ca    8.3[L]      07 Nov 2024 04:30  Phos  4.8     11-07  Mg     2.10     11-07            Urinalysis Basic - ( 07 Nov 2024 04:30 )    Color: x / Appearance: x / SG: x / pH: x  Gluc: 88 mg/dL / Ketone: x  / Bili: x / Urobili: x   Blood: x / Protein: x / Nitrite: x   Leuk Esterase: x / RBC: x / WBC x   Sq Epi: x / Non Sq Epi: x / Bacteria: x      Microbiology: reviewed     Culture - Urine (collected 10-29-24 @ 09:31)  Source: Clean Catch Clean Catch (Midstream)  Final Report (10-30-24 @ 06:36):    <10,000 CFU/mL Normal Urogenital Kimberly    Culture - Blood (collected 10-28-24 @ 19:10)  Source: .Blood BLOOD  Final Report (11-03-24 @ 01:19):    No growth at 5 days    Culture - Blood (collected 10-28-24 @ 19:05)  Source: .Blood BLOOD  Final Report (11-03-24 @ 01:19):    No growth at 5 days        Radiology: reviewed     Medications:  allopurinol 100 milliGRAM(s) Oral daily  apixaban 2.5 milliGRAM(s) Oral two times a day  chlorhexidine 2% Cloths 1 Application(s) Topical daily  furosemide    Tablet 80 milliGRAM(s) Oral daily  hydroxyurea 500 milliGRAM(s) Oral daily  influenza  Vaccine (HIGH DOSE) 0.5 milliLiter(s) IntraMuscular once  metoprolol tartrate 50 milliGRAM(s) Oral two times a day    Antimicrobials:

## 2024-11-07 NOTE — PROGRESS NOTE ADULT - SUBJECTIVE AND OBJECTIVE BOX
DATE OF SERVICE: 11-07-24    Patient denies chest pain or shortness of breath.   Review of symptoms otherwise negative.    MEDICATIONS:  allopurinol 100 milliGRAM(s) Oral daily  apixaban 2.5 milliGRAM(s) Oral two times a day  chlorhexidine 2% Cloths 1 Application(s) Topical daily  furosemide    Tablet 80 milliGRAM(s) Oral daily  hydroxyurea 500 milliGRAM(s) Oral daily  influenza  Vaccine (HIGH DOSE) 0.5 milliLiter(s) IntraMuscular once  metoprolol tartrate 50 milliGRAM(s) Oral two times a day                         8.5    20.46 )-----------( 640      ( 07 Nov 2024 04:30 )             28.0       135  |  98  |  55[H]  ----------------------------<  88  4.4   |  26  |  2.26[H]    Ca    8.3[L]      07 Nov 2024 04:30  Phos  4.8     11-07  Mg     2.10     11-07      T(C): 36.8 (11-07-24 @ 12:07), Max: 36.8 (11-06-24 @ 20:26)  HR: 76 (11-07-24 @ 12:07) (76 - 103)  BP: 103/62 (11-07-24 @ 12:07) (99/54 - 117/61)  RR: 18 (11-07-24 @ 12:07) (18 - 18)  SpO2: 97% (11-07-24 @ 12:07) (97% - 99%)  Wt(kg): --    I&O's Summary    06 Nov 2024 07:01  -  07 Nov 2024 07:00  --------------------------------------------------------  IN: 0 mL / OUT: 0 mL / NET: 0 mL    07 Nov 2024 07:01  -  07 Nov 2024 12:48  --------------------------------------------------------  IN: 200 mL / OUT: 550 mL / NET: -350 mL      General: Well nourished in no acute distress. Alert and Oriented * 3.   Head: Normocephalic and atraumatic.   Neck: + JVD. No bruits. Supple. Does not appear to be enlarged.   Cardiovascular: + S1,S2 ; RRR Soft systolic murmur at the left lower sternal border. No rubs noted.    Lungs: CTA b/l. No rhonchi, rales or wheezes.   Abdomen: + BS, soft. Non tender. Non distended. No rebound. No guarding.   Extremities: No clubbing/cyanosis/1+ LE edema   Neurologic: Moves all four extremities. Full range of motion.   Skin: Warm and moist. The patient's skin has normal elasticity and good skin turgor.   Psychiatric: Appropriate mood and affect.  Musculoskeletal: Normal range of motion, normal strength    DATA    TELEMETRY: 	AF      ECG:  Afib with RVR	    < from: CT Chest No Cont (10.29.24 @ 09:53) >  IMPRESSION:  *  Bilateral pulmonary ground glass opacity consistent with edema versus   pneumonia.  *  Trace bilateral pleural effusions, ascites, and anasarca.  *  No evidence of acute infectious process in the abdomen and pelvis.  *  Bilateral renal cysts and right renal atrophy. No hydronephrosis.    < end of copied text >    < from: CT Head No Cont (10.29.24 @ 09:51) >  IMPRESSION:    No acute pathology.    < end of copied text >      < from: TTE W or WO Ultrasound Enhancing Agent (10.29.24 @ 13:17) >  CONCLUSIONS:      1. Left ventricular endocardium is not well visualized; however, the left ventricular systolic function appears grossly normal.   2. Enlarged right ventricular cavity size and reduced right ventricular systolic function.   3. There is calcification of the aortic valve leaflets. There is severe aortic stenosis. The peak transaortic velocity is 2.33 m/s, peak transaortic gradient is 21.8 mmHg and mean transaortic gradient is 13.3 mmHg with an LVOT/aortic valve VTI ratio of 0.39. The aortic valve area is estimated at 1.00 cm² by the continuity equation.   4. Structurally normal mitral valve with normal leaflet excursion. There is calcification of the mitral valve annulus. There is moderate to severe mitral regurgitation.   5. Structurally normal tricuspid valve with normal leaflet excursion. There is moderate to severe tricuspid regurgitation.   6. No pericardial effusion seen.   7. The inferior vena cava is dilated (dilated >2.1cm) with abnormal inspiratory collapse (abnormal <50%) consistent with elevated right atrial pressure (~15, range 10-20mmHg).   8. Technically difficult image quality.    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    #Rapid Afib  -- in the setting of multiple medical issues (volume overload, elevated WBC from baseline, EDA, transaminitis)  -- cont Lopressor  50 mg bid   -- cont lifelong AC with Eliquis    #Volume Overload  -- pt with known valvular disease  -- TTE with severe AS, mod-sev MR, mod-sev TR  -- Cont Lasix 80MG PO daily  -- previously saw CTS per Dr. Chow and prefer conservative management, Spoke with Daughter in law Ginger Guerin and prefer conservative tx    #EDA  -- Renal Following, no plans for HD at this time, EDA improving    F/U with OP PMD, OP Cardiologist Dr Chow after DC      Zara ROBERTS  [phone]

## 2024-11-07 NOTE — PROGRESS NOTE ADULT - NS ATTEND [facility] GEN_ALL_CORE

## 2024-11-20 ENCOUNTER — TRANSCRIPTION ENCOUNTER (OUTPATIENT)
Age: 84
End: 2024-11-20

## 2024-11-21 ENCOUNTER — RESULT REVIEW (OUTPATIENT)
Age: 84
End: 2024-11-21

## 2024-11-21 ENCOUNTER — APPOINTMENT (OUTPATIENT)
Dept: HEMATOLOGY ONCOLOGY | Facility: CLINIC | Age: 84
End: 2024-11-21
Payer: MEDICARE

## 2024-11-21 VITALS
TEMPERATURE: 97.6 F | WEIGHT: 131.82 LBS | HEART RATE: 82 BPM | BODY MASS INDEX: 24.89 KG/M2 | OXYGEN SATURATION: 96 % | DIASTOLIC BLOOD PRESSURE: 70 MMHG | RESPIRATION RATE: 16 BRPM | SYSTOLIC BLOOD PRESSURE: 150 MMHG

## 2024-11-21 DIAGNOSIS — D47.3 ESSENTIAL (HEMORRHAGIC) THROMBOCYTHEMIA: ICD-10-CM

## 2024-11-21 DIAGNOSIS — D75.839 THROMBOCYTOSIS, UNSPECIFIED: ICD-10-CM

## 2024-11-21 LAB
ANISOCYTOSIS BLD QL: SIGNIFICANT CHANGE UP
BASOPHILS # BLD AUTO: 0.18 K/UL — SIGNIFICANT CHANGE UP (ref 0–0.2)
BASOPHILS NFR BLD AUTO: 1 % — SIGNIFICANT CHANGE UP (ref 0–2)
BURR CELLS BLD QL SMEAR: PRESENT — SIGNIFICANT CHANGE UP
DACRYOCYTES BLD QL SMEAR: SLIGHT — SIGNIFICANT CHANGE UP
ELLIPTOCYTES BLD QL SMEAR: SLIGHT — SIGNIFICANT CHANGE UP
EOSINOPHIL # BLD AUTO: 0.36 K/UL — SIGNIFICANT CHANGE UP (ref 0–0.5)
EOSINOPHIL NFR BLD AUTO: 2 % — SIGNIFICANT CHANGE UP (ref 0–6)
GIANT PLATELETS BLD QL SMEAR: PRESENT — SIGNIFICANT CHANGE UP
HCT VFR BLD CALC: 30.5 % — LOW (ref 34.5–45)
HGB BLD-MCNC: 8.9 G/DL — LOW (ref 11.5–15.5)
HYPOCHROMIA BLD QL: SLIGHT — SIGNIFICANT CHANGE UP
LG PLATELETS BLD QL AUTO: SIGNIFICANT CHANGE UP
LYMPHOCYTES # BLD AUTO: 1.62 K/UL — SIGNIFICANT CHANGE UP (ref 1–3.3)
LYMPHOCYTES # BLD AUTO: 9 % — LOW (ref 13–44)
MCHC RBC-ENTMCNC: 24 PG — LOW (ref 27–34)
MCHC RBC-ENTMCNC: 29.2 G/DL — LOW (ref 32–36)
MCV RBC AUTO: 82.2 FL — SIGNIFICANT CHANGE UP (ref 80–100)
METAMYELOCYTES # FLD: 2 % — HIGH (ref 0–0)
MONOCYTES # BLD AUTO: 0.72 K/UL — SIGNIFICANT CHANGE UP (ref 0–0.9)
MONOCYTES NFR BLD AUTO: 4 % — SIGNIFICANT CHANGE UP (ref 2–14)
MYELOCYTES NFR BLD: 2 % — HIGH (ref 0–0)
NEUTROPHILS # BLD AUTO: 14.38 K/UL — HIGH (ref 1.8–7.4)
NEUTROPHILS NFR BLD AUTO: 80 % — HIGH (ref 43–77)
NRBC # BLD: 0 /100 WBCS — SIGNIFICANT CHANGE UP (ref 0–0)
NRBC # BLD: SIGNIFICANT CHANGE UP /100 WBCS (ref 0–0)
PLAT MORPH BLD: ABNORMAL
PLATELET # BLD AUTO: 966 K/UL — HIGH (ref 150–400)
POIKILOCYTOSIS BLD QL AUTO: SIGNIFICANT CHANGE UP
RBC # BLD: 3.71 M/UL — LOW (ref 3.8–5.2)
RBC # FLD: 23.9 % — HIGH (ref 10.3–14.5)
RBC BLD AUTO: ABNORMAL
SCHISTOCYTES BLD QL AUTO: SLIGHT — SIGNIFICANT CHANGE UP
TARGETS BLD QL SMEAR: SLIGHT — SIGNIFICANT CHANGE UP
WBC # BLD: 17.97 K/UL — HIGH (ref 3.8–10.5)
WBC # FLD AUTO: 17.97 K/UL — HIGH (ref 3.8–10.5)

## 2024-11-21 PROCEDURE — 99214 OFFICE O/P EST MOD 30 MIN: CPT

## 2024-11-22 LAB
ALBUMIN SERPL ELPH-MCNC: 3.6 G/DL
ALP BLD-CCNC: 120 U/L
ALT SERPL-CCNC: 13 U/L
ANION GAP SERPL CALC-SCNC: 14 MMOL/L
AST SERPL-CCNC: 22 U/L
BILIRUB SERPL-MCNC: 0.8 MG/DL
BUN SERPL-MCNC: 55 MG/DL
CALCIUM SERPL-MCNC: 9.6 MG/DL
CHLORIDE SERPL-SCNC: 100 MMOL/L
CO2 SERPL-SCNC: 21 MMOL/L
CREAT SERPL-MCNC: 2.37 MG/DL
EGFR: 20 ML/MIN/1.73M2
GLUCOSE SERPL-MCNC: 157 MG/DL
LDH SERPL-CCNC: 397 U/L
POTASSIUM SERPL-SCNC: 5.3 MMOL/L
PROT SERPL-MCNC: 6.6 G/DL
SODIUM SERPL-SCNC: 135 MMOL/L

## 2024-12-03 PROBLEM — I50.9 HEART FAILURE, UNSPECIFIED: Chronic | Status: ACTIVE | Noted: 2024-10-29

## 2024-12-03 PROBLEM — I48.91 UNSPECIFIED ATRIAL FIBRILLATION: Chronic | Status: ACTIVE | Noted: 2024-10-29

## 2024-12-04 ENCOUNTER — TRANSCRIPTION ENCOUNTER (OUTPATIENT)
Age: 84
End: 2024-12-04

## 2025-01-24 DIAGNOSIS — D75.839 THROMBOCYTOSIS, UNSPECIFIED: ICD-10-CM

## 2025-01-29 ENCOUNTER — OUTPATIENT (OUTPATIENT)
Dept: OUTPATIENT SERVICES | Facility: HOSPITAL | Age: 85
LOS: 1 days | Discharge: HOME | End: 2025-01-29

## 2025-01-29 DIAGNOSIS — D72.89 OTHER SPECIFIED DISORDERS OF WHITE BLOOD CELLS: ICD-10-CM

## 2025-02-03 ENCOUNTER — RESULT REVIEW (OUTPATIENT)
Age: 85
End: 2025-02-03

## 2025-02-03 ENCOUNTER — NON-APPOINTMENT (OUTPATIENT)
Age: 85
End: 2025-02-03

## 2025-02-03 ENCOUNTER — APPOINTMENT (OUTPATIENT)
Dept: HEMATOLOGY ONCOLOGY | Facility: CLINIC | Age: 85
End: 2025-02-03
Payer: MEDICARE

## 2025-02-03 VITALS
SYSTOLIC BLOOD PRESSURE: 135 MMHG | RESPIRATION RATE: 17 BRPM | BODY MASS INDEX: 22.27 KG/M2 | TEMPERATURE: 97.9 F | OXYGEN SATURATION: 100 % | DIASTOLIC BLOOD PRESSURE: 81 MMHG | HEART RATE: 83 BPM | WEIGHT: 117.95 LBS

## 2025-02-03 LAB
ANISOCYTOSIS BLD QL: SIGNIFICANT CHANGE UP
BASOPHILS # BLD AUTO: 0.19 K/UL — SIGNIFICANT CHANGE UP (ref 0–0.2)
BASOPHILS NFR BLD AUTO: 1 % — SIGNIFICANT CHANGE UP (ref 0–2)
BURR CELLS BLD QL SMEAR: PRESENT — SIGNIFICANT CHANGE UP
DACRYOCYTES BLD QL SMEAR: SLIGHT — SIGNIFICANT CHANGE UP
ELLIPTOCYTES BLD QL SMEAR: SLIGHT — SIGNIFICANT CHANGE UP
EOSINOPHIL # BLD AUTO: 0.75 K/UL — HIGH (ref 0–0.5)
EOSINOPHIL NFR BLD AUTO: 4 % — SIGNIFICANT CHANGE UP (ref 0–6)
GIANT PLATELETS BLD QL SMEAR: PRESENT — SIGNIFICANT CHANGE UP
HCT VFR BLD CALC: 38.9 % — SIGNIFICANT CHANGE UP (ref 34.5–45)
HGB BLD-MCNC: 11.1 G/DL — LOW (ref 11.5–15.5)
HYPOCHROMIA BLD QL: SLIGHT — SIGNIFICANT CHANGE UP
LG PLATELETS BLD QL AUTO: SIGNIFICANT CHANGE UP
LYMPHOCYTES # BLD AUTO: 14 % — SIGNIFICANT CHANGE UP (ref 13–44)
LYMPHOCYTES # BLD AUTO: 2.62 K/UL — SIGNIFICANT CHANGE UP (ref 1–3.3)
MCHC RBC-ENTMCNC: 21.6 PG — LOW (ref 27–34)
MCHC RBC-ENTMCNC: 28.5 G/DL — LOW (ref 32–36)
MCV RBC AUTO: 75.7 FL — LOW (ref 80–100)
MONOCYTES # BLD AUTO: 1.31 K/UL — HIGH (ref 0–0.9)
MONOCYTES NFR BLD AUTO: 7 % — SIGNIFICANT CHANGE UP (ref 2–14)
MYELOCYTES NFR BLD: 1 % — HIGH (ref 0–0)
NEUTROPHILS # BLD AUTO: 13.68 K/UL — HIGH (ref 1.8–7.4)
NEUTROPHILS NFR BLD AUTO: 73 % — SIGNIFICANT CHANGE UP (ref 43–77)
NRBC # BLD: 0 /100 WBCS — SIGNIFICANT CHANGE UP (ref 0–0)
NRBC # BLD: SIGNIFICANT CHANGE UP /100 WBCS (ref 0–0)
NRBC BLD-RTO: 0 /100 WBCS — SIGNIFICANT CHANGE UP (ref 0–0)
NRBC BLD-RTO: SIGNIFICANT CHANGE UP /100 WBCS (ref 0–0)
PLAT MORPH BLD: ABNORMAL
PLATELET # BLD AUTO: 1220 K/UL — CRITICAL HIGH (ref 150–400)
POIKILOCYTOSIS BLD QL AUTO: SIGNIFICANT CHANGE UP
RBC # BLD: 5.14 M/UL — SIGNIFICANT CHANGE UP (ref 3.8–5.2)
RBC # FLD: 20.7 % — HIGH (ref 10.3–14.5)
RBC BLD AUTO: ABNORMAL
SCHISTOCYTES BLD QL AUTO: SLIGHT — SIGNIFICANT CHANGE UP
TARGETS BLD QL SMEAR: SLIGHT — SIGNIFICANT CHANGE UP
WBC # BLD: 18.74 K/UL — HIGH (ref 3.8–10.5)
WBC # FLD AUTO: 18.74 K/UL — HIGH (ref 3.8–10.5)

## 2025-02-03 PROCEDURE — 99214 OFFICE O/P EST MOD 30 MIN: CPT

## 2025-02-03 RX ORDER — HYDROXYUREA 500 MG/1
500 CAPSULE ORAL TWICE DAILY
Qty: 180 | Refills: 3 | Status: ACTIVE | COMMUNITY
Start: 2025-02-03 | End: 1900-01-01

## 2025-02-13 DIAGNOSIS — D47.3 ESSENTIAL (HEMORRHAGIC) THROMBOCYTHEMIA: ICD-10-CM

## 2025-02-25 ENCOUNTER — APPOINTMENT (OUTPATIENT)
Dept: HEMATOLOGY ONCOLOGY | Facility: CLINIC | Age: 85
End: 2025-02-25
Payer: MEDICARE

## 2025-02-25 ENCOUNTER — RESULT REVIEW (OUTPATIENT)
Age: 85
End: 2025-02-25

## 2025-02-25 ENCOUNTER — NON-APPOINTMENT (OUTPATIENT)
Age: 85
End: 2025-02-25

## 2025-02-25 VITALS
TEMPERATURE: 97 F | BODY MASS INDEX: 21.6 KG/M2 | SYSTOLIC BLOOD PRESSURE: 106 MMHG | HEART RATE: 85 BPM | RESPIRATION RATE: 17 BRPM | WEIGHT: 114.42 LBS | HEIGHT: 61.02 IN | DIASTOLIC BLOOD PRESSURE: 67 MMHG

## 2025-02-25 DIAGNOSIS — D47.3 ESSENTIAL (HEMORRHAGIC) THROMBOCYTHEMIA: ICD-10-CM

## 2025-02-25 LAB
ANISOCYTOSIS BLD QL: SIGNIFICANT CHANGE UP
BASOPHILS # BLD AUTO: 0.22 K/UL — HIGH (ref 0–0.2)
BASOPHILS NFR BLD AUTO: 3.3 % — HIGH (ref 0–2)
ELLIPTOCYTES BLD QL SMEAR: SIGNIFICANT CHANGE UP
EOSINOPHIL # BLD AUTO: 0.44 K/UL — SIGNIFICANT CHANGE UP (ref 0–0.5)
EOSINOPHIL NFR BLD AUTO: 6.5 % — HIGH (ref 0–6)
HCT VFR BLD CALC: 41.5 % — SIGNIFICANT CHANGE UP (ref 34.5–45)
HGB BLD-MCNC: 11.9 G/DL — SIGNIFICANT CHANGE UP (ref 11.5–15.5)
IMM GRANULOCYTES NFR BLD AUTO: 0.9 % — SIGNIFICANT CHANGE UP (ref 0–0.9)
LG PLATELETS BLD QL AUTO: SLIGHT — SIGNIFICANT CHANGE UP
LYMPHOCYTES # BLD AUTO: 2.03 K/UL — SIGNIFICANT CHANGE UP (ref 1–3.3)
LYMPHOCYTES # BLD AUTO: 30.1 % — SIGNIFICANT CHANGE UP (ref 13–44)
MCHC RBC-ENTMCNC: 22.2 PG — LOW (ref 27–34)
MCHC RBC-ENTMCNC: 28.7 G/DL — LOW (ref 32–36)
MCV RBC AUTO: 77.4 FL — LOW (ref 80–100)
MICROCYTES BLD QL: SLIGHT — SIGNIFICANT CHANGE UP
MONOCYTES # BLD AUTO: 0.44 K/UL — SIGNIFICANT CHANGE UP (ref 0–0.9)
MONOCYTES NFR BLD AUTO: 6.5 % — SIGNIFICANT CHANGE UP (ref 2–14)
NEUTROPHILS # BLD AUTO: 3.56 K/UL — SIGNIFICANT CHANGE UP (ref 1.8–7.4)
NEUTROPHILS NFR BLD AUTO: 52.7 % — SIGNIFICANT CHANGE UP (ref 43–77)
NRBC BLD AUTO-RTO: 0 /100 WBCS — SIGNIFICANT CHANGE UP (ref 0–0)
PLAT MORPH BLD: ABNORMAL
PLATELET # BLD AUTO: 386 K/UL — SIGNIFICANT CHANGE UP (ref 150–400)
POIKILOCYTOSIS BLD QL AUTO: SIGNIFICANT CHANGE UP
RBC # BLD: 5.36 M/UL — HIGH (ref 3.8–5.2)
RBC # FLD: 22.8 % — HIGH (ref 10.3–14.5)
RBC BLD AUTO: ABNORMAL
SCHISTOCYTES BLD QL AUTO: SLIGHT — SIGNIFICANT CHANGE UP
TARGETS BLD QL SMEAR: SLIGHT — SIGNIFICANT CHANGE UP
WBC # BLD: 6.75 K/UL — SIGNIFICANT CHANGE UP (ref 3.8–10.5)
WBC # FLD AUTO: 6.75 K/UL — SIGNIFICANT CHANGE UP (ref 3.8–10.5)

## 2025-02-25 PROCEDURE — 99214 OFFICE O/P EST MOD 30 MIN: CPT

## 2025-03-18 ENCOUNTER — RESULT REVIEW (OUTPATIENT)
Age: 85
End: 2025-03-18

## 2025-03-18 ENCOUNTER — APPOINTMENT (OUTPATIENT)
Dept: HEMATOLOGY ONCOLOGY | Facility: CLINIC | Age: 85
End: 2025-03-18
Payer: MEDICARE

## 2025-03-18 VITALS
OXYGEN SATURATION: 99 % | BODY MASS INDEX: 22.06 KG/M2 | SYSTOLIC BLOOD PRESSURE: 142 MMHG | HEART RATE: 99 BPM | TEMPERATURE: 97 F | HEIGHT: 61 IN | WEIGHT: 116.83 LBS | DIASTOLIC BLOOD PRESSURE: 82 MMHG | RESPIRATION RATE: 17 BRPM

## 2025-03-18 DIAGNOSIS — D47.3 ESSENTIAL (HEMORRHAGIC) THROMBOCYTHEMIA: ICD-10-CM

## 2025-03-18 LAB
ALBUMIN SERPL ELPH-MCNC: 4.3 G/DL
ALP BLD-CCNC: 120 U/L
ALT SERPL-CCNC: 8 U/L
ANION GAP SERPL CALC-SCNC: 17 MMOL/L
AST SERPL-CCNC: 14 U/L
BASOPHILS # BLD AUTO: 0.09 K/UL — SIGNIFICANT CHANGE UP (ref 0–0.2)
BASOPHILS NFR BLD AUTO: 1.4 % — SIGNIFICANT CHANGE UP (ref 0–2)
BILIRUB SERPL-MCNC: 0.4 MG/DL
BUN SERPL-MCNC: 45 MG/DL
CALCIUM SERPL-MCNC: 9.1 MG/DL
CHLORIDE SERPL-SCNC: 102 MMOL/L
CO2 SERPL-SCNC: 18 MMOL/L
CREAT SERPL-MCNC: 1.86 MG/DL
EGFRCR SERPLBLD CKD-EPI 2021: 26 ML/MIN/1.73M2
EOSINOPHIL # BLD AUTO: 0.19 K/UL — SIGNIFICANT CHANGE UP (ref 0–0.5)
EOSINOPHIL NFR BLD AUTO: 3 % — SIGNIFICANT CHANGE UP (ref 0–6)
GLUCOSE SERPL-MCNC: 135 MG/DL
HCT VFR BLD CALC: 36.1 % — SIGNIFICANT CHANGE UP (ref 34.5–45)
HGB BLD-MCNC: 10.8 G/DL — LOW (ref 11.5–15.5)
IMM GRANULOCYTES NFR BLD AUTO: 0.6 % — SIGNIFICANT CHANGE UP (ref 0–0.9)
LDH SERPL-CCNC: 201 U/L
LYMPHOCYTES # BLD AUTO: 2.3 K/UL — SIGNIFICANT CHANGE UP (ref 1–3.3)
LYMPHOCYTES # BLD AUTO: 36.3 % — SIGNIFICANT CHANGE UP (ref 13–44)
MCHC RBC-ENTMCNC: 24.3 PG — LOW (ref 27–34)
MCHC RBC-ENTMCNC: 29.9 G/DL — LOW (ref 32–36)
MCV RBC AUTO: 81.1 FL — SIGNIFICANT CHANGE UP (ref 80–100)
MONOCYTES # BLD AUTO: 0.44 K/UL — SIGNIFICANT CHANGE UP (ref 0–0.9)
MONOCYTES NFR BLD AUTO: 6.9 % — SIGNIFICANT CHANGE UP (ref 2–14)
NEUTROPHILS # BLD AUTO: 3.28 K/UL — SIGNIFICANT CHANGE UP (ref 1.8–7.4)
NEUTROPHILS NFR BLD AUTO: 51.8 % — SIGNIFICANT CHANGE UP (ref 43–77)
NRBC BLD AUTO-RTO: 0 /100 WBCS — SIGNIFICANT CHANGE UP (ref 0–0)
PLATELET # BLD AUTO: 221 K/UL — SIGNIFICANT CHANGE UP (ref 150–400)
POTASSIUM SERPL-SCNC: 5.1 MMOL/L
PROT SERPL-MCNC: 7.2 G/DL
RBC # BLD: 4.45 M/UL — SIGNIFICANT CHANGE UP (ref 3.8–5.2)
RBC # FLD: 28 % — HIGH (ref 10.3–14.5)
SODIUM SERPL-SCNC: 137 MMOL/L
WBC # BLD: 6.34 K/UL — SIGNIFICANT CHANGE UP (ref 3.8–10.5)
WBC # FLD AUTO: 6.34 K/UL — SIGNIFICANT CHANGE UP (ref 3.8–10.5)

## 2025-03-18 PROCEDURE — 99214 OFFICE O/P EST MOD 30 MIN: CPT

## 2025-04-17 DIAGNOSIS — D47.3 ESSENTIAL (HEMORRHAGIC) THROMBOCYTHEMIA: ICD-10-CM

## 2025-04-25 ENCOUNTER — OUTPATIENT (OUTPATIENT)
Dept: OUTPATIENT SERVICES | Facility: HOSPITAL | Age: 85
LOS: 1 days | Discharge: HOME | End: 2025-04-25

## 2025-04-25 DIAGNOSIS — D72.89 OTHER SPECIFIED DISORDERS OF WHITE BLOOD CELLS: ICD-10-CM

## 2025-04-29 ENCOUNTER — RESULT REVIEW (OUTPATIENT)
Age: 85
End: 2025-04-29

## 2025-04-29 ENCOUNTER — APPOINTMENT (OUTPATIENT)
Dept: HEMATOLOGY ONCOLOGY | Facility: CLINIC | Age: 85
End: 2025-04-29
Payer: MEDICARE

## 2025-04-29 VITALS
HEIGHT: 61 IN | HEART RATE: 75 BPM | RESPIRATION RATE: 17 BRPM | DIASTOLIC BLOOD PRESSURE: 76 MMHG | SYSTOLIC BLOOD PRESSURE: 125 MMHG | BODY MASS INDEX: 23.03 KG/M2 | TEMPERATURE: 96.8 F | WEIGHT: 122 LBS | OXYGEN SATURATION: 99 %

## 2025-04-29 DIAGNOSIS — D47.3 ESSENTIAL (HEMORRHAGIC) THROMBOCYTHEMIA: ICD-10-CM

## 2025-04-29 LAB
ANISOCYTOSIS BLD QL: SIGNIFICANT CHANGE UP
BASOPHILS # BLD AUTO: 0.05 K/UL — SIGNIFICANT CHANGE UP (ref 0–0.2)
BASOPHILS NFR BLD AUTO: 0.8 % — SIGNIFICANT CHANGE UP (ref 0–2)
DACRYOCYTES BLD QL SMEAR: SLIGHT — SIGNIFICANT CHANGE UP
ELLIPTOCYTES BLD QL SMEAR: SIGNIFICANT CHANGE UP
EOSINOPHIL # BLD AUTO: 0.18 K/UL — SIGNIFICANT CHANGE UP (ref 0–0.5)
EOSINOPHIL NFR BLD AUTO: 2.9 % — SIGNIFICANT CHANGE UP (ref 0–6)
HCT VFR BLD CALC: 29.8 % — LOW (ref 34.5–45)
HGB BLD-MCNC: 9.3 G/DL — LOW (ref 11.5–15.5)
IMM GRANULOCYTES NFR BLD AUTO: 0.5 % — SIGNIFICANT CHANGE UP (ref 0–0.9)
LYMPHOCYTES # BLD AUTO: 2.32 K/UL — SIGNIFICANT CHANGE UP (ref 1–3.3)
LYMPHOCYTES # BLD AUTO: 37.2 % — SIGNIFICANT CHANGE UP (ref 13–44)
MCHC RBC-ENTMCNC: 29.6 PG — SIGNIFICANT CHANGE UP (ref 27–34)
MCHC RBC-ENTMCNC: 31.3 G/DL — LOW (ref 32–36)
MCV RBC AUTO: 94.3 FL — SIGNIFICANT CHANGE UP (ref 80–100)
MONOCYTES # BLD AUTO: 0.51 K/UL — SIGNIFICANT CHANGE UP (ref 0–0.9)
MONOCYTES NFR BLD AUTO: 8.2 % — SIGNIFICANT CHANGE UP (ref 2–14)
NEUTROPHILS # BLD AUTO: 3.15 K/UL — SIGNIFICANT CHANGE UP (ref 1.8–7.4)
NEUTROPHILS NFR BLD AUTO: 50.4 % — SIGNIFICANT CHANGE UP (ref 43–77)
NRBC BLD AUTO-RTO: 0 /100 WBCS — SIGNIFICANT CHANGE UP (ref 0–0)
PLAT MORPH BLD: NORMAL — SIGNIFICANT CHANGE UP
PLATELET # BLD AUTO: 259 K/UL — SIGNIFICANT CHANGE UP (ref 150–400)
POIKILOCYTOSIS BLD QL AUTO: SIGNIFICANT CHANGE UP
RBC # BLD: 3.14 M/UL — LOW (ref 3.8–5.2)
RBC # FLD: 39.4 % — HIGH (ref 10.3–14.5)
RBC BLD AUTO: ABNORMAL
SCHISTOCYTES BLD QL AUTO: SLIGHT — SIGNIFICANT CHANGE UP
WBC # BLD: 6.24 K/UL — SIGNIFICANT CHANGE UP (ref 3.8–10.5)
WBC # FLD AUTO: 6.24 K/UL — SIGNIFICANT CHANGE UP (ref 3.8–10.5)

## 2025-04-29 PROCEDURE — 99214 OFFICE O/P EST MOD 30 MIN: CPT

## 2025-06-28 ENCOUNTER — OUTPATIENT (OUTPATIENT)
Dept: OUTPATIENT SERVICES | Facility: HOSPITAL | Age: 85
LOS: 1 days | Discharge: HOME | End: 2025-06-28

## 2025-06-28 DIAGNOSIS — D72.89 OTHER SPECIFIED DISORDERS OF WHITE BLOOD CELLS: ICD-10-CM

## 2025-07-01 ENCOUNTER — RESULT REVIEW (OUTPATIENT)
Age: 85
End: 2025-07-01

## 2025-07-01 ENCOUNTER — APPOINTMENT (OUTPATIENT)
Dept: HEMATOLOGY ONCOLOGY | Facility: CLINIC | Age: 85
End: 2025-07-01
Payer: MEDICARE

## 2025-07-01 ENCOUNTER — LABORATORY RESULT (OUTPATIENT)
Age: 85
End: 2025-07-01

## 2025-07-01 VITALS
OXYGEN SATURATION: 99 % | DIASTOLIC BLOOD PRESSURE: 73 MMHG | WEIGHT: 122.9 LBS | TEMPERATURE: 97.3 F | BODY MASS INDEX: 23.2 KG/M2 | HEIGHT: 61 IN | SYSTOLIC BLOOD PRESSURE: 122 MMHG | RESPIRATION RATE: 17 BRPM | HEART RATE: 55 BPM

## 2025-07-01 LAB
BASOPHILS # BLD AUTO: 0.03 K/UL — SIGNIFICANT CHANGE UP (ref 0–0.2)
BASOPHILS NFR BLD AUTO: 0.5 % — SIGNIFICANT CHANGE UP (ref 0–2)
EOSINOPHIL # BLD AUTO: 0.13 K/UL — SIGNIFICANT CHANGE UP (ref 0–0.5)
EOSINOPHIL NFR BLD AUTO: 2.3 % — SIGNIFICANT CHANGE UP (ref 0–6)
HCT VFR BLD CALC: 25 % — LOW (ref 34.5–45)
HGB BLD-MCNC: 8.2 G/DL — LOW (ref 11.5–15.5)
IMM GRANULOCYTES NFR BLD AUTO: 0.3 % — SIGNIFICANT CHANGE UP (ref 0–0.9)
LYMPHOCYTES # BLD AUTO: 2.14 K/UL — SIGNIFICANT CHANGE UP (ref 1–3.3)
LYMPHOCYTES # BLD AUTO: 37.3 % — SIGNIFICANT CHANGE UP (ref 13–44)
MCHC RBC-ENTMCNC: 32.8 G/DL — SIGNIFICANT CHANGE UP (ref 32–36)
MCHC RBC-ENTMCNC: 43.2 PG — HIGH (ref 27–34)
MCV RBC AUTO: 130.5 FL — HIGH (ref 80–100)
MONOCYTES # BLD AUTO: 0.56 K/UL — SIGNIFICANT CHANGE UP (ref 0–0.9)
MONOCYTES NFR BLD AUTO: 9.8 % — SIGNIFICANT CHANGE UP (ref 2–14)
NEUTROPHILS # BLD AUTO: 2.85 K/UL — SIGNIFICANT CHANGE UP (ref 1.8–7.4)
NEUTROPHILS NFR BLD AUTO: 49.8 % — SIGNIFICANT CHANGE UP (ref 43–77)
NRBC BLD AUTO-RTO: 0 /100 WBCS — SIGNIFICANT CHANGE UP (ref 0–0)
PLATELET # BLD AUTO: 286 K/UL — SIGNIFICANT CHANGE UP (ref 150–400)
RBC # BLD: 1.9 M/UL — LOW (ref 3.8–5.2)
RBC # FLD: 15.6 % — HIGH (ref 10.3–14.5)
WBC # BLD: 5.73 K/UL — SIGNIFICANT CHANGE UP (ref 3.8–10.5)
WBC # FLD AUTO: 5.73 K/UL — SIGNIFICANT CHANGE UP (ref 3.8–10.5)

## 2025-07-01 PROCEDURE — 99214 OFFICE O/P EST MOD 30 MIN: CPT

## 2025-07-03 LAB
ALBUMIN SERPL ELPH-MCNC: 4.2 G/DL
ALP BLD-CCNC: 107 U/L
ALT SERPL-CCNC: 8 U/L
ANION GAP SERPL CALC-SCNC: 14 MMOL/L
AST SERPL-CCNC: 12 U/L
BILIRUB SERPL-MCNC: 0.4 MG/DL
BUN SERPL-MCNC: 57 MG/DL
CALCIUM SERPL-MCNC: 9.2 MG/DL
CHLORIDE SERPL-SCNC: 100 MMOL/L
CO2 SERPL-SCNC: 22 MMOL/L
CREAT SERPL-MCNC: 2.44 MG/DL
EGFRCR SERPLBLD CKD-EPI 2021: 19 ML/MIN/1.73M2
GLUCOSE SERPL-MCNC: 104 MG/DL
POTASSIUM SERPL-SCNC: 5.2 MMOL/L
PROT SERPL-MCNC: 7.2 G/DL
SODIUM SERPL-SCNC: 136 MMOL/L

## 2025-08-08 DIAGNOSIS — D47.3 ESSENTIAL (HEMORRHAGIC) THROMBOCYTHEMIA: ICD-10-CM

## 2025-08-26 ENCOUNTER — APPOINTMENT (OUTPATIENT)
Dept: HEMATOLOGY ONCOLOGY | Facility: CLINIC | Age: 85
End: 2025-08-26
Payer: MEDICARE

## 2025-08-26 ENCOUNTER — RESULT REVIEW (OUTPATIENT)
Age: 85
End: 2025-08-26

## 2025-08-26 VITALS
WEIGHT: 121.46 LBS | OXYGEN SATURATION: 99 % | RESPIRATION RATE: 17 BRPM | DIASTOLIC BLOOD PRESSURE: 76 MMHG | BODY MASS INDEX: 22.93 KG/M2 | HEART RATE: 94 BPM | TEMPERATURE: 97.3 F | HEIGHT: 61 IN | SYSTOLIC BLOOD PRESSURE: 108 MMHG

## 2025-08-26 DIAGNOSIS — D47.3 ESSENTIAL (HEMORRHAGIC) THROMBOCYTHEMIA: ICD-10-CM

## 2025-08-26 LAB
ALBUMIN SERPL ELPH-MCNC: 4.3 G/DL — SIGNIFICANT CHANGE UP (ref 3.3–5)
ALP SERPL-CCNC: 101 U/L — SIGNIFICANT CHANGE UP (ref 40–120)
ALT FLD-CCNC: <5 U/L — LOW (ref 10–45)
ANION GAP SERPL CALC-SCNC: 12 MMOL/L — SIGNIFICANT CHANGE UP (ref 5–17)
AST SERPL-CCNC: 13 U/L — SIGNIFICANT CHANGE UP (ref 10–40)
BASOPHILS # BLD AUTO: 0.03 K/UL — SIGNIFICANT CHANGE UP (ref 0–0.2)
BASOPHILS NFR BLD AUTO: 0.6 % — SIGNIFICANT CHANGE UP (ref 0–2)
BILIRUB SERPL-MCNC: 0.4 MG/DL — SIGNIFICANT CHANGE UP (ref 0.2–1.2)
BUN SERPL-MCNC: 58 MG/DL — HIGH (ref 7–23)
CALCIUM SERPL-MCNC: 9.4 MG/DL — SIGNIFICANT CHANGE UP (ref 8.4–10.5)
CHLORIDE SERPL-SCNC: 103 MMOL/L — SIGNIFICANT CHANGE UP (ref 96–108)
CO2 SERPL-SCNC: 23 MMOL/L — SIGNIFICANT CHANGE UP (ref 22–31)
CREAT SERPL-MCNC: 2.36 MG/DL — HIGH (ref 0.5–1.3)
EGFR: 20 ML/MIN/1.73M2 — LOW
EGFR: 20 ML/MIN/1.73M2 — LOW
EOSINOPHIL # BLD AUTO: 0.11 K/UL — SIGNIFICANT CHANGE UP (ref 0–0.5)
EOSINOPHIL NFR BLD AUTO: 2.2 % — SIGNIFICANT CHANGE UP (ref 0–6)
GLUCOSE SERPL-MCNC: 100 MG/DL — HIGH (ref 70–99)
HCT VFR BLD CALC: 26.8 % — LOW (ref 34.5–45)
HGB BLD-MCNC: 9 G/DL — LOW (ref 11.5–15.5)
IMM GRANULOCYTES NFR BLD AUTO: 0.6 % — SIGNIFICANT CHANGE UP (ref 0–0.9)
LDH SERPL L TO P-CCNC: 174 U/L — SIGNIFICANT CHANGE UP (ref 50–242)
LYMPHOCYTES # BLD AUTO: 1.72 K/UL — SIGNIFICANT CHANGE UP (ref 1–3.3)
LYMPHOCYTES # BLD AUTO: 34.3 % — SIGNIFICANT CHANGE UP (ref 13–44)
MCHC RBC-ENTMCNC: 33.6 G/DL — SIGNIFICANT CHANGE UP (ref 32–36)
MCHC RBC-ENTMCNC: 43.3 PG — HIGH (ref 27–34)
MCV RBC AUTO: 128.8 FL — HIGH (ref 80–100)
MONOCYTES # BLD AUTO: 0.36 K/UL — SIGNIFICANT CHANGE UP (ref 0–0.9)
MONOCYTES NFR BLD AUTO: 7.2 % — SIGNIFICANT CHANGE UP (ref 2–14)
NEUTROPHILS # BLD AUTO: 2.76 K/UL — SIGNIFICANT CHANGE UP (ref 1.8–7.4)
NEUTROPHILS NFR BLD AUTO: 55.1 % — SIGNIFICANT CHANGE UP (ref 43–77)
NRBC BLD AUTO-RTO: 0 /100 WBCS — SIGNIFICANT CHANGE UP (ref 0–0)
PLATELET # BLD AUTO: 255 K/UL — SIGNIFICANT CHANGE UP (ref 150–400)
POTASSIUM SERPL-MCNC: 6 MMOL/L — HIGH (ref 3.5–5.3)
POTASSIUM SERPL-SCNC: 6 MMOL/L — HIGH (ref 3.5–5.3)
PROT SERPL-MCNC: 7.2 G/DL — SIGNIFICANT CHANGE UP (ref 6–8.3)
RBC # BLD: 2.08 M/UL — LOW (ref 3.8–5.2)
RBC # FLD: 13 % — SIGNIFICANT CHANGE UP (ref 10.3–14.5)
SODIUM SERPL-SCNC: 138 MMOL/L — SIGNIFICANT CHANGE UP (ref 135–145)
WBC # BLD: 5.01 K/UL — SIGNIFICANT CHANGE UP (ref 3.8–10.5)
WBC # FLD AUTO: 5.01 K/UL — SIGNIFICANT CHANGE UP (ref 3.8–10.5)

## 2025-08-26 PROCEDURE — 99214 OFFICE O/P EST MOD 30 MIN: CPT
